# Patient Record
Sex: FEMALE | Race: WHITE | NOT HISPANIC OR LATINO | Employment: OTHER | URBAN - METROPOLITAN AREA
[De-identification: names, ages, dates, MRNs, and addresses within clinical notes are randomized per-mention and may not be internally consistent; named-entity substitution may affect disease eponyms.]

---

## 2018-02-14 ENCOUNTER — HISTORICAL (OUTPATIENT)
Dept: LAB | Facility: HOSPITAL | Age: 78
End: 2018-02-14

## 2018-02-14 LAB
ABS NEUT (OLG): 2.82 X10(3)/MCL (ref 2.1–9.2)
BASOPHILS # BLD AUTO: 0 X10(3)/MCL (ref 0–0.2)
BASOPHILS NFR BLD AUTO: 0 %
EOSINOPHIL # BLD AUTO: 0.2 X10(3)/MCL (ref 0–0.9)
EOSINOPHIL NFR BLD AUTO: 4 %
ERYTHROCYTE [DISTWIDTH] IN BLOOD BY AUTOMATED COUNT: 14.2 % (ref 11.5–17)
HCT VFR BLD AUTO: 42.1 % (ref 37–47)
HGB BLD-MCNC: 13.1 GM/DL (ref 12–16)
LYMPHOCYTES # BLD AUTO: 2.1 X10(3)/MCL (ref 0.6–4.6)
LYMPHOCYTES NFR BLD AUTO: 37 %
MCH RBC QN AUTO: 29.3 PG (ref 27–31)
MCHC RBC AUTO-ENTMCNC: 31.1 GM/DL (ref 33–36)
MCV RBC AUTO: 94.2 FL (ref 80–94)
MONOCYTES # BLD AUTO: 0.4 X10(3)/MCL (ref 0.1–1.3)
MONOCYTES NFR BLD AUTO: 8 %
NEUTROPHILS # BLD AUTO: 2.82 X10(3)/MCL (ref 1.4–7.9)
NEUTROPHILS NFR BLD AUTO: 50 %
PLATELET # BLD AUTO: 132 X10(3)/MCL (ref 130–400)
PMV BLD AUTO: 11.6 FL (ref 9.4–12.4)
RBC # BLD AUTO: 4.47 X10(6)/MCL (ref 4.2–5.4)
WBC # SPEC AUTO: 5.6 X10(3)/MCL (ref 4.5–11.5)

## 2019-03-18 ENCOUNTER — HISTORICAL (OUTPATIENT)
Dept: LAB | Facility: HOSPITAL | Age: 79
End: 2019-03-18

## 2019-03-18 LAB
ABS NEUT (OLG): 2.97 X10(3)/MCL (ref 2.1–9.2)
ALBUMIN SERPL-MCNC: 4.1 GM/DL (ref 3.4–5)
ALBUMIN/GLOB SERPL: 1.2 {RATIO}
ALP SERPL-CCNC: 69 UNIT/L (ref 38–126)
ALT SERPL-CCNC: 35 UNIT/L (ref 12–78)
AST SERPL-CCNC: 30 UNIT/L (ref 15–37)
BASOPHILS # BLD AUTO: 0 X10(3)/MCL (ref 0–0.2)
BASOPHILS NFR BLD AUTO: 1 %
BILIRUB SERPL-MCNC: 0.4 MG/DL (ref 0.2–1)
BILIRUBIN DIRECT+TOT PNL SERPL-MCNC: 0.1 MG/DL (ref 0–0.2)
BILIRUBIN DIRECT+TOT PNL SERPL-MCNC: 0.3 MG/DL (ref 0–0.8)
BUN SERPL-MCNC: 19 MG/DL (ref 7–18)
CALCIUM SERPL-MCNC: 9.2 MG/DL (ref 8.5–10.1)
CHLORIDE SERPL-SCNC: 103 MMOL/L (ref 98–107)
CHOLEST SERPL-MCNC: 162 MG/DL (ref 0–200)
CHOLEST/HDLC SERPL: 3.2 {RATIO} (ref 0–4)
CO2 SERPL-SCNC: 29 MMOL/L (ref 21–32)
CREAT SERPL-MCNC: 0.69 MG/DL (ref 0.55–1.02)
DEPRECATED CALCIDIOL+CALCIFEROL SERPL-MC: 33.63 NG/ML (ref 30–80)
EOSINOPHIL # BLD AUTO: 0.2 X10(3)/MCL (ref 0–0.9)
EOSINOPHIL NFR BLD AUTO: 3 %
ERYTHROCYTE [DISTWIDTH] IN BLOOD BY AUTOMATED COUNT: 15.8 % (ref 11.5–17)
EST. AVERAGE GLUCOSE BLD GHB EST-MCNC: 194 MG/DL
GLOBULIN SER-MCNC: 3.4 GM/DL (ref 2.4–3.5)
GLUCOSE SERPL-MCNC: 162 MG/DL (ref 74–106)
HBA1C MFR BLD: 8.4 % (ref 4.2–6.3)
HCT VFR BLD AUTO: 42.7 % (ref 37–47)
HDLC SERPL-MCNC: 50 MG/DL (ref 35–60)
HGB BLD-MCNC: 12.9 GM/DL (ref 12–16)
LDLC SERPL CALC-MCNC: 83 MG/DL (ref 0–129)
LYMPHOCYTES # BLD AUTO: 1.4 X10(3)/MCL (ref 0.6–4.6)
LYMPHOCYTES NFR BLD AUTO: 29 %
MCH RBC QN AUTO: 26.4 PG (ref 27–31)
MCHC RBC AUTO-ENTMCNC: 30.2 GM/DL (ref 33–36)
MCV RBC AUTO: 87.3 FL (ref 80–94)
MONOCYTES # BLD AUTO: 0.3 X10(3)/MCL (ref 0.1–1.3)
MONOCYTES NFR BLD AUTO: 7 %
NEUTROPHILS # BLD AUTO: 2.97 X10(3)/MCL (ref 2.1–9.2)
NEUTROPHILS NFR BLD AUTO: 60 %
PLATELET # BLD AUTO: 156 X10(3)/MCL (ref 130–400)
PMV BLD AUTO: 12.4 FL (ref 9.4–12.4)
POTASSIUM SERPL-SCNC: 4.4 MMOL/L (ref 3.5–5.1)
PROT SERPL-MCNC: 7.5 GM/DL (ref 6.4–8.2)
RBC # BLD AUTO: 4.89 X10(6)/MCL (ref 4.2–5.4)
SODIUM SERPL-SCNC: 139 MMOL/L (ref 136–145)
TRIGL SERPL-MCNC: 144 MG/DL (ref 30–150)
TSH SERPL-ACNC: 4.49 MIU/L (ref 0.36–3.74)
VLDLC SERPL CALC-MCNC: 29 MG/DL
WBC # SPEC AUTO: 4.9 X10(3)/MCL (ref 4.5–11.5)

## 2019-03-25 ENCOUNTER — HISTORICAL (OUTPATIENT)
Dept: LAB | Facility: HOSPITAL | Age: 79
End: 2019-03-25

## 2019-03-25 LAB
ALBUMIN SERPL-MCNC: 3.9 GM/DL (ref 3.4–5)
ALBUMIN/GLOB SERPL: 1.1 {RATIO}
ALP SERPL-CCNC: 61 UNIT/L (ref 38–126)
ALT SERPL-CCNC: 33 UNIT/L (ref 12–78)
APPEARANCE, UA: CLEAR
AST SERPL-CCNC: 29 UNIT/L (ref 15–37)
BACTERIA SPEC CULT: ABNORMAL /HPF
BILIRUB SERPL-MCNC: 0.5 MG/DL (ref 0.2–1)
BILIRUB UR QL STRIP: NEGATIVE
BILIRUBIN DIRECT+TOT PNL SERPL-MCNC: 0.2 MG/DL (ref 0–0.2)
BILIRUBIN DIRECT+TOT PNL SERPL-MCNC: 0.3 MG/DL (ref 0–0.8)
BUN SERPL-MCNC: 17 MG/DL (ref 7–18)
CALCIUM SERPL-MCNC: 9.3 MG/DL (ref 8.5–10.1)
CHLORIDE SERPL-SCNC: 104 MMOL/L (ref 98–107)
CO2 SERPL-SCNC: 25 MMOL/L (ref 21–32)
COLOR UR: YELLOW
CREAT SERPL-MCNC: 0.75 MG/DL (ref 0.55–1.02)
CREAT UR-MCNC: 107 MG/DL
EST. AVERAGE GLUCOSE BLD GHB EST-MCNC: 200 MG/DL
GLOBULIN SER-MCNC: 3.4 GM/DL (ref 2.4–3.5)
GLUCOSE (UA): ABNORMAL
GLUCOSE SERPL-MCNC: 131 MG/DL (ref 74–106)
HBA1C MFR BLD: 8.6 % (ref 4.2–6.3)
HGB UR QL STRIP: NEGATIVE
KETONES UR QL STRIP: NEGATIVE
LEUKOCYTE ESTERASE UR QL STRIP: ABNORMAL
MICROALBUMIN UR-MCNC: 1.5 MG/DL
MICROALBUMIN/CREAT RATIO PNL UR: 14 MG/GM CR (ref 0–30)
NITRITE UR QL STRIP: NEGATIVE
PH UR STRIP: 5 [PH] (ref 5–9)
POTASSIUM SERPL-SCNC: 4.8 MMOL/L (ref 3.5–5.1)
PROT SERPL-MCNC: 7.3 GM/DL (ref 6.4–8.2)
PROT UR QL STRIP: NEGATIVE
RBC #/AREA URNS HPF: ABNORMAL /[HPF]
SODIUM SERPL-SCNC: 139 MMOL/L (ref 136–145)
SP GR UR STRIP: 1.02 (ref 1–1.03)
SQUAMOUS EPITHELIAL, UA: 7 /HPF (ref 0–4)
TSH SERPL-ACNC: 3.9 MIU/L (ref 0.36–3.74)
UROBILINOGEN UR STRIP-ACNC: 0.2
WBC #/AREA URNS HPF: ABNORMAL /[HPF]

## 2019-03-27 LAB — FINAL CULTURE: NO GROWTH

## 2021-03-15 ENCOUNTER — HISTORICAL (OUTPATIENT)
Dept: ENDOSCOPY | Facility: HOSPITAL | Age: 81
End: 2021-03-15

## 2021-04-05 ENCOUNTER — HISTORICAL (OUTPATIENT)
Dept: RADIOLOGY | Facility: HOSPITAL | Age: 81
End: 2021-04-05

## 2021-04-05 LAB
ABS NEUT (OLG): 4.91 X10(3)/MCL (ref 2.1–9.2)
ALBUMIN SERPL-MCNC: 3.2 GM/DL (ref 3.4–4.8)
ALBUMIN/GLOB SERPL: 1 RATIO (ref 1.1–2)
ALP SERPL-CCNC: 59 UNIT/L (ref 40–150)
ALT SERPL-CCNC: 11 UNIT/L (ref 0–55)
AST SERPL-CCNC: 17 UNIT/L (ref 5–34)
BASOPHILS # BLD AUTO: 0 X10(3)/MCL (ref 0–0.2)
BASOPHILS NFR BLD AUTO: 0 %
BILIRUB SERPL-MCNC: 0.5 MG/DL
BILIRUBIN DIRECT+TOT PNL SERPL-MCNC: 0.2 MG/DL (ref 0–0.8)
BILIRUBIN DIRECT+TOT PNL SERPL-MCNC: 0.3 MG/DL (ref 0–0.5)
BUN SERPL-MCNC: 13.3 MG/DL (ref 9.8–20.1)
CALCIUM SERPL-MCNC: 9.6 MG/DL (ref 8.4–10.2)
CEA SERPL-MCNC: 2.28 NG/ML (ref 0–3)
CHLORIDE SERPL-SCNC: 104 MMOL/L (ref 98–107)
CO2 SERPL-SCNC: 29 MMOL/L (ref 23–31)
CREAT SERPL-MCNC: 0.6 MG/DL (ref 0.55–1.02)
EOSINOPHIL # BLD AUTO: 0.1 X10(3)/MCL (ref 0–0.9)
EOSINOPHIL NFR BLD AUTO: 1 %
ERYTHROCYTE [DISTWIDTH] IN BLOOD BY AUTOMATED COUNT: 21.4 % (ref 11.5–17)
GLOBULIN SER-MCNC: 3.2 GM/DL (ref 2.4–3.5)
GLUCOSE SERPL-MCNC: 119 MG/DL (ref 82–115)
HCT VFR BLD AUTO: 27 % (ref 37–47)
HGB BLD-MCNC: 7.5 GM/DL (ref 12–16)
LYMPHOCYTES # BLD AUTO: 1.5 X10(3)/MCL (ref 0.6–4.6)
LYMPHOCYTES NFR BLD AUTO: 21 %
MCH RBC QN AUTO: 19 PG (ref 27–31)
MCHC RBC AUTO-ENTMCNC: 27.8 GM/DL (ref 33–36)
MCV RBC AUTO: 68.5 FL (ref 80–94)
MONOCYTES # BLD AUTO: 0.6 X10(3)/MCL (ref 0.1–1.3)
MONOCYTES NFR BLD AUTO: 8 %
NEUTROPHILS # BLD AUTO: 4.91 X10(3)/MCL (ref 2.1–9.2)
NEUTROPHILS NFR BLD AUTO: 69 %
PLATELET # BLD AUTO: 335 X10(3)/MCL (ref 130–400)
PMV BLD AUTO: 10.4 FL (ref 9.4–12.4)
POTASSIUM SERPL-SCNC: 5.2 MMOL/L (ref 3.5–5.1)
PROT SERPL-MCNC: 6.4 GM/DL (ref 5.8–7.6)
RBC # BLD AUTO: 3.94 X10(6)/MCL (ref 4.2–5.4)
SODIUM SERPL-SCNC: 141 MMOL/L (ref 136–145)
WBC # SPEC AUTO: 7.1 X10(3)/MCL (ref 4.5–11.5)

## 2021-04-20 ENCOUNTER — HISTORICAL (OUTPATIENT)
Dept: INFUSION THERAPY | Facility: HOSPITAL | Age: 81
End: 2021-04-20

## 2021-04-20 LAB
ABS NEUT (OLG): 5.25 X10(3)/MCL (ref 2.1–9.2)
ANISOCYTOSIS BLD QL SMEAR: NORMAL
BASOPHILS # BLD AUTO: 0 X10(3)/MCL (ref 0–0.2)
BASOPHILS NFR BLD AUTO: 0.3 %
CROSSMATCH INTERPRETATION: NORMAL
EOSINOPHIL # BLD AUTO: 0.1 X10(3)/MCL (ref 0–0.9)
EOSINOPHIL NFR BLD AUTO: 1.2 %
ERYTHROCYTE [DISTWIDTH] IN BLOOD BY AUTOMATED COUNT: 22.6 % (ref 11.5–17)
GROUP & RH: NORMAL
HCT VFR BLD AUTO: 26 % (ref 37–47)
HGB BLD-MCNC: 7.5 GM/DL (ref 12–16)
HYPOCHROMIA BLD QL SMEAR: NORMAL
LYMPHOCYTES # BLD AUTO: 0.9 X10(3)/MCL (ref 0.6–4.6)
LYMPHOCYTES NFR BLD AUTO: 13.8 %
MCH RBC QN AUTO: 20.2 PG (ref 27–31)
MCHC RBC AUTO-ENTMCNC: 28.8 GM/DL (ref 33–36)
MCV RBC AUTO: 70.1 FL (ref 80–94)
MONOCYTES # BLD AUTO: 0.5 X10(3)/MCL (ref 0.1–1.3)
MONOCYTES NFR BLD AUTO: 7.2 %
NEUTROPHILS # BLD AUTO: 5.2 X10(3)/MCL (ref 2.1–9.2)
NEUTROPHILS NFR BLD AUTO: 77.2 %
OVALOCYTES BLD QL SMEAR: NORMAL
PLATELET # BLD AUTO: 208 X10(3)/MCL (ref 130–400)
PLATELET # BLD EST: NORMAL 10*3/UL
PMV BLD AUTO: 9.9 FL (ref 9.4–12.4)
POIKILOCYTOSIS BLD QL SMEAR: NORMAL
POLYCHROMASIA BLD QL SMEAR: NORMAL
PRODUCT READY: NORMAL
RBC # BLD AUTO: 3.71 X10(6)/MCL (ref 4.2–5.4)
RBC MORPH BLD: NORMAL
WBC # SPEC AUTO: 6.8 X10(3)/MCL (ref 4.5–11.5)

## 2021-05-20 ENCOUNTER — NURSE TRIAGE (OUTPATIENT)
Dept: ADMINISTRATIVE | Facility: CLINIC | Age: 81
End: 2021-05-20

## 2021-05-26 ENCOUNTER — HISTORICAL (OUTPATIENT)
Dept: ADMINISTRATIVE | Facility: HOSPITAL | Age: 81
End: 2021-05-26

## 2021-05-26 LAB
ABS NEUT (OLG): 5.61 X10(3)/MCL (ref 2.1–9.2)
ALBUMIN % SPEP (OHS): 41.33 % (ref 48.1–59.5)
ALBUMIN % SPEP (OHS): NORMAL % (ref 48.1–59.5)
ALBUMIN SERPL-MCNC: 3.1 GM/DL (ref 3.4–4.8)
ALBUMIN SERPL-MCNC: 3.2 GM/DL (ref 3.4–4.8)
ALBUMIN SERPL-MCNC: NORMAL GM/DL (ref 3.4–4.8)
ALBUMIN/GLOB SERPL: 0.8 RATIO (ref 1.1–2)
ALBUMIN/GLOB SERPL: 0.9 RATIO (ref 1.1–2)
ALBUMIN/GLOB SERPL: NORMAL RATIO (ref 1.1–2)
ALP SERPL-CCNC: 99 UNIT/L (ref 40–150)
ALPHA 1 GLOB (OHS): 0.41 GM/DL (ref 0–0.4)
ALPHA 1 GLOB (OHS): NORMAL GM/DL (ref 0–0.4)
ALPHA 1 GLOB% (OHS): 6 % (ref 2.3–4.9)
ALPHA 1 GLOB% (OHS): NORMAL % (ref 2.3–4.9)
ALPHA 2 GLOB % (OHS): 15.91 % (ref 6.9–13)
ALPHA 2 GLOB % (OHS): NORMAL % (ref 6.9–13)
ALPHA 2 GLOB (OHS): 1.08 GM/DL (ref 0.4–1)
ALPHA 2 GLOB (OHS): NORMAL GM/DL (ref 0.4–1)
ALT SERPL-CCNC: 28 UNIT/L (ref 0–55)
AST SERPL-CCNC: 39 UNIT/L (ref 5–34)
BASOPHILS # BLD AUTO: 0 X10(3)/MCL (ref 0–0.2)
BASOPHILS NFR BLD AUTO: 0.5 %
BETA GLOB (OHS): 1.19 GM/DL (ref 0.7–1.3)
BETA GLOB% (OHS): 17.43 % (ref 13.8–19.7)
BETA GLOB% (OHS): NORMAL % (ref 13.8–19.7)
BILIRUB SERPL-MCNC: 0.5 MG/DL
BILIRUBIN DIRECT+TOT PNL SERPL-MCNC: 0.2 MG/DL (ref 0–0.8)
BILIRUBIN DIRECT+TOT PNL SERPL-MCNC: 0.3 MG/DL (ref 0–0.5)
BUN SERPL-MCNC: 11.7 MG/DL (ref 9.8–20.1)
CALCIUM SERPL-MCNC: 9.7 MG/DL (ref 8.4–10.2)
CHLORIDE SERPL-SCNC: 103 MMOL/L (ref 98–107)
CO2 SERPL-SCNC: 26 MMOL/L (ref 23–31)
CREAT SERPL-MCNC: 0.66 MG/DL (ref 0.55–1.02)
EOSINOPHIL # BLD AUTO: 0.1 X10(3)/MCL (ref 0–0.9)
EOSINOPHIL NFR BLD AUTO: 1.2 %
ERYTHROCYTE [DISTWIDTH] IN BLOOD BY AUTOMATED COUNT: 23.8 % (ref 11.5–17)
GAMMA GLOBULIN % (OHS): 19.33 % (ref 10.1–21.9)
GAMMA GLOBULIN % (OHS): NORMAL % (ref 10.1–21.9)
GAMMA GLOBULIN (OHS): 1.31 GM/DL (ref 0.4–1.8)
GAMMA GLOBULIN (OHS): NORMAL GM/DL (ref 0.4–1.8)
GLOBULIN SER-MCNC: 3.7 GM/DL (ref 2.4–3.5)
GLOBULIN SER-MCNC: 3.7 GM/DL (ref 2.4–3.5)
GLOBULIN SER-MCNC: NORMAL GM/DL (ref 2.4–3.5)
GLUCOSE SERPL-MCNC: 141 MG/DL (ref 82–115)
HCT VFR BLD AUTO: 34.8 % (ref 37–47)
HGB BLD-MCNC: 10.5 GM/DL (ref 12–16)
IFE INTERPRETATION (OHS): ABNORMAL
IGA SERPL-MCNC: 584 MG/DL (ref 69–517)
IGG SERPL-MCNC: 1228 MG/DL
IGM SERPL-MCNC: 97 MG/DL (ref 33–293)
LYMPHOCYTES # BLD AUTO: 1.2 X10(3)/MCL (ref 0.6–4.6)
LYMPHOCYTES NFR BLD AUTO: 16.4 %
M SPIKE % (OHS): ABNORMAL
M SPIKE % (OHS): NORMAL %
M SPIKE (OHS): ABNORMAL
M SPIKE (OHS): NORMAL GM/DL
MCH RBC QN AUTO: 22.6 PG (ref 27–31)
MCHC RBC AUTO-ENTMCNC: 30.2 GM/DL (ref 33–36)
MCV RBC AUTO: 74.8 FL (ref 80–94)
MONOCYTES # BLD AUTO: 0.4 X10(3)/MCL (ref 0.1–1.3)
MONOCYTES NFR BLD AUTO: 5.8 %
NEUTROPHILS # BLD AUTO: 5.6 X10(3)/MCL (ref 2.1–9.2)
NEUTROPHILS NFR BLD AUTO: 75.2 %
PEP GRAPH (OHS): ABNORMAL
PLATELET # BLD AUTO: 340 X10(3)/MCL (ref 130–400)
PMV BLD AUTO: 9.4 FL (ref 9.4–12.4)
POTASSIUM SERPL-SCNC: 4.2 MMOL/L (ref 3.5–5.1)
PROT SERPL-MCNC: 6.8 GM/DL (ref 5.8–7.6)
PROT SERPL-MCNC: 6.9 GM/DL (ref 5.8–7.6)
PROT SERPL-MCNC: NORMAL GM/DL (ref 5.8–7.6)
RBC # BLD AUTO: 4.65 X10(6)/MCL (ref 4.2–5.4)
SODIUM SERPL-SCNC: 145 MMOL/L (ref 136–145)
SPE INTERPRETATION (OHS): NORMAL
WBC # SPEC AUTO: 7.5 X10(3)/MCL (ref 4.5–11.5)

## 2021-06-10 ENCOUNTER — HISTORICAL (OUTPATIENT)
Dept: ADMINISTRATIVE | Facility: HOSPITAL | Age: 81
End: 2021-06-10

## 2021-06-10 LAB
APPEARANCE, UA: CLEAR
BACTERIA SPEC CULT: ABNORMAL
BILIRUB UR QL STRIP: NEGATIVE
COLOR UR: YELLOW
GLUCOSE (UA): NEGATIVE
HGB UR QL STRIP: NEGATIVE
KETONES UR QL STRIP: NEGATIVE
LEUKOCYTE ESTERASE UR QL STRIP: NEGATIVE
NITRITE UR QL STRIP: NEGATIVE
PH UR STRIP: 5.5 [PH] (ref 5–9)
PROT UR QL STRIP: NEGATIVE
RBC #/AREA URNS HPF: ABNORMAL /[HPF]
SP GR UR STRIP: 1.02 (ref 1–1.03)
SQUAMOUS EPITHELIAL, UA: ABNORMAL
UROBILINOGEN UR STRIP-ACNC: 0.2
WBC #/AREA URNS HPF: ABNORMAL /HPF

## 2021-06-16 ENCOUNTER — HISTORICAL (OUTPATIENT)
Dept: ADMINISTRATIVE | Facility: HOSPITAL | Age: 81
End: 2021-06-16

## 2021-06-16 LAB
ABS NEUT (OLG): 3.27 X10(3)/MCL (ref 2.1–9.2)
ALBUMIN SERPL-MCNC: 3.2 GM/DL (ref 3.4–4.8)
ALBUMIN/GLOB SERPL: 0.9 RATIO (ref 1.1–2)
ALP SERPL-CCNC: 79 UNIT/L (ref 40–150)
ALT SERPL-CCNC: 13 UNIT/L (ref 0–55)
AST SERPL-CCNC: 22 UNIT/L (ref 5–34)
BASOPHILS # BLD AUTO: 0 X10(3)/MCL (ref 0–0.2)
BASOPHILS NFR BLD AUTO: 0.5 %
BILIRUB SERPL-MCNC: 0.3 MG/DL
BILIRUBIN DIRECT+TOT PNL SERPL-MCNC: 0.1 MG/DL (ref 0–0.8)
BILIRUBIN DIRECT+TOT PNL SERPL-MCNC: 0.2 MG/DL (ref 0–0.5)
BUN SERPL-MCNC: 14.7 MG/DL (ref 9.8–20.1)
CALCIUM SERPL-MCNC: 10.1 MG/DL (ref 8.4–10.2)
CEA SERPL-MCNC: 2.89 NG/ML (ref 0–3)
CHLORIDE SERPL-SCNC: 102 MMOL/L (ref 98–107)
CO2 SERPL-SCNC: 27 MMOL/L (ref 23–31)
CREAT SERPL-MCNC: 0.9 MG/DL (ref 0.55–1.02)
EOSINOPHIL # BLD AUTO: 0.2 X10(3)/MCL (ref 0–0.9)
EOSINOPHIL NFR BLD AUTO: 2.7 %
ERYTHROCYTE [DISTWIDTH] IN BLOOD BY AUTOMATED COUNT: 23.8 % (ref 11.5–17)
GLOBULIN SER-MCNC: 3.7 GM/DL (ref 2.4–3.5)
GLUCOSE SERPL-MCNC: 106 MG/DL (ref 82–115)
HCT VFR BLD AUTO: 35.4 % (ref 37–47)
HGB BLD-MCNC: 10.8 GM/DL (ref 12–16)
LYMPHOCYTES # BLD AUTO: 2 X10(3)/MCL (ref 0.6–4.6)
LYMPHOCYTES NFR BLD AUTO: 34 %
MCH RBC QN AUTO: 23.7 PG (ref 27–31)
MCHC RBC AUTO-ENTMCNC: 30.5 GM/DL (ref 33–36)
MCV RBC AUTO: 77.6 FL (ref 80–94)
MONOCYTES # BLD AUTO: 0.4 X10(3)/MCL (ref 0.1–1.3)
MONOCYTES NFR BLD AUTO: 6.7 %
NEUTROPHILS # BLD AUTO: 3.3 X10(3)/MCL (ref 2.1–9.2)
NEUTROPHILS NFR BLD AUTO: 55.9 %
PLATELET # BLD AUTO: 195 X10(3)/MCL (ref 130–400)
PMV BLD AUTO: 10.5 FL (ref 9.4–12.4)
POTASSIUM SERPL-SCNC: 5.6 MMOL/L (ref 3.5–5.1)
PROT SERPL-MCNC: 6.9 GM/DL (ref 5.8–7.6)
RBC # BLD AUTO: 4.56 X10(6)/MCL (ref 4.2–5.4)
SODIUM SERPL-SCNC: 140 MMOL/L (ref 136–145)
WBC # SPEC AUTO: 5.8 X10(3)/MCL (ref 4.5–11.5)

## 2021-09-20 ENCOUNTER — HISTORICAL (OUTPATIENT)
Dept: HEMATOLOGY/ONCOLOGY | Facility: CLINIC | Age: 81
End: 2021-09-20

## 2021-09-20 LAB
ABS NEUT (OLG): 3.49 X10(3)/MCL (ref 2.1–9.2)
ALBUMIN SERPL-MCNC: 3.8 GM/DL (ref 3.4–4.8)
ALBUMIN/GLOB SERPL: 1.3 RATIO (ref 1.1–2)
ALP SERPL-CCNC: 75 UNIT/L (ref 40–150)
ALT SERPL-CCNC: 14 UNIT/L (ref 0–55)
AST SERPL-CCNC: 21 UNIT/L (ref 5–34)
BASOPHILS # BLD AUTO: 0 X10(3)/MCL (ref 0–0.2)
BASOPHILS NFR BLD AUTO: 0.5 %
BILIRUB SERPL-MCNC: 0.5 MG/DL
BILIRUBIN DIRECT+TOT PNL SERPL-MCNC: 0.2 MG/DL (ref 0–0.5)
BILIRUBIN DIRECT+TOT PNL SERPL-MCNC: 0.3 MG/DL (ref 0–0.8)
BUN SERPL-MCNC: 16.4 MG/DL (ref 9.8–20.1)
CALCIUM SERPL-MCNC: 10.1 MG/DL (ref 8.4–10.2)
CEA SERPL-MCNC: 1.75 NG/ML (ref 0–3)
CHLORIDE SERPL-SCNC: 103 MMOL/L (ref 98–107)
CO2 SERPL-SCNC: 29 MMOL/L (ref 23–31)
CREAT SERPL-MCNC: 0.87 MG/DL (ref 0.55–1.02)
EOSINOPHIL # BLD AUTO: 0.1 X10(3)/MCL (ref 0–0.9)
EOSINOPHIL NFR BLD AUTO: 1.7 %
ERYTHROCYTE [DISTWIDTH] IN BLOOD BY AUTOMATED COUNT: 14.6 % (ref 11.5–17)
GLOBULIN SER-MCNC: 3 GM/DL (ref 2.4–3.5)
GLUCOSE SERPL-MCNC: 132 MG/DL (ref 82–115)
HCT VFR BLD AUTO: 40.8 % (ref 37–47)
HGB BLD-MCNC: 12.7 GM/DL (ref 12–16)
LYMPHOCYTES # BLD AUTO: 1.7 X10(3)/MCL (ref 0.6–4.6)
LYMPHOCYTES NFR BLD AUTO: 29.7 %
MCH RBC QN AUTO: 27.5 PG (ref 27–31)
MCHC RBC AUTO-ENTMCNC: 31.1 GM/DL (ref 33–36)
MCV RBC AUTO: 88.3 FL (ref 80–94)
MONOCYTES # BLD AUTO: 0.5 X10(3)/MCL (ref 0.1–1.3)
MONOCYTES NFR BLD AUTO: 8.2 %
NEUTROPHILS # BLD AUTO: 3.5 X10(3)/MCL (ref 2.1–9.2)
NEUTROPHILS NFR BLD AUTO: 59.7 %
PLATELET # BLD AUTO: 147 X10(3)/MCL (ref 130–400)
PMV BLD AUTO: 10.1 FL (ref 9.4–12.4)
POTASSIUM SERPL-SCNC: 4.8 MMOL/L (ref 3.5–5.1)
PROT SERPL-MCNC: 6.8 GM/DL (ref 5.8–7.6)
RBC # BLD AUTO: 4.62 X10(6)/MCL (ref 4.2–5.4)
SODIUM SERPL-SCNC: 143 MMOL/L (ref 136–145)
WBC # SPEC AUTO: 5.8 X10(3)/MCL (ref 4.5–11.5)

## 2021-09-24 ENCOUNTER — HISTORICAL (OUTPATIENT)
Dept: CARDIOLOGY | Facility: HOSPITAL | Age: 81
End: 2021-09-24

## 2021-09-24 LAB
INR PPP: 1 (ref 0–1.3)
PROTHROMBIN TIME: 13 SECOND(S) (ref 12.5–14.5)

## 2021-11-11 ENCOUNTER — HISTORICAL (OUTPATIENT)
Dept: ADMINISTRATIVE | Facility: HOSPITAL | Age: 81
End: 2021-11-11

## 2021-11-11 LAB
LEFT EYE DM RETINOPATHY: POSITIVE
RIGHT EYE DM RETINOPATHY: POSITIVE

## 2022-01-06 ENCOUNTER — HISTORICAL (OUTPATIENT)
Dept: HEMATOLOGY/ONCOLOGY | Facility: CLINIC | Age: 82
End: 2022-01-06

## 2022-01-06 LAB
ABS NEUT (OLG): 1.91 X10(3)/MCL (ref 2.1–9.2)
ALBUMIN SERPL-MCNC: 4 GM/DL (ref 3.4–4.8)
ALBUMIN/GLOB SERPL: 1.4 RATIO (ref 1.1–2)
ALP SERPL-CCNC: 72 UNIT/L (ref 40–150)
ALT SERPL-CCNC: 26 UNIT/L (ref 0–55)
AST SERPL-CCNC: 31 UNIT/L (ref 5–34)
BASOPHILS # BLD AUTO: 0 X10(3)/MCL (ref 0–0.2)
BASOPHILS NFR BLD AUTO: 0.8 %
BILIRUB SERPL-MCNC: 0.5 MG/DL
BILIRUBIN DIRECT+TOT PNL SERPL-MCNC: 0.2 MG/DL (ref 0–0.8)
BILIRUBIN DIRECT+TOT PNL SERPL-MCNC: 0.3 MG/DL (ref 0–0.5)
BUN SERPL-MCNC: 21.4 MG/DL (ref 9.8–20.1)
CALCIUM SERPL-MCNC: 10.1 MG/DL (ref 8.7–10.5)
CEA SERPL-MCNC: 2.08 NG/ML (ref 0–3)
CHLORIDE SERPL-SCNC: 102 MMOL/L (ref 98–107)
CO2 SERPL-SCNC: 28 MMOL/L (ref 23–31)
CREAT SERPL-MCNC: 0.89 MG/DL (ref 0.55–1.02)
EOSINOPHIL # BLD AUTO: 0 X10(3)/MCL (ref 0–0.9)
EOSINOPHIL NFR BLD AUTO: 1.1 %
ERYTHROCYTE [DISTWIDTH] IN BLOOD BY AUTOMATED COUNT: 14.5 % (ref 11.5–17)
GLOBULIN SER-MCNC: 2.9 GM/DL (ref 2.4–3.5)
GLUCOSE SERPL-MCNC: 90 MG/DL (ref 82–115)
HCT VFR BLD AUTO: 39.2 % (ref 37–47)
HGB BLD-MCNC: 12.8 GM/DL (ref 12–16)
LYMPHOCYTES # BLD AUTO: 1.1 X10(3)/MCL (ref 0.6–4.6)
LYMPHOCYTES NFR BLD AUTO: 30.7 %
MCH RBC QN AUTO: 28.8 PG (ref 27–31)
MCHC RBC AUTO-ENTMCNC: 32.7 GM/DL (ref 33–36)
MCV RBC AUTO: 88.1 FL (ref 80–94)
MONOCYTES # BLD AUTO: 0.5 X10(3)/MCL (ref 0.1–1.3)
MONOCYTES NFR BLD AUTO: 14.4 %
NEUTROPHILS # BLD AUTO: 1.9 X10(3)/MCL (ref 2.1–9.2)
NEUTROPHILS NFR BLD AUTO: 52.7 %
PLATELET # BLD AUTO: 116 X10(3)/MCL (ref 130–400)
PMV BLD AUTO: 10.8 FL (ref 9.4–12.4)
POTASSIUM SERPL-SCNC: 4.5 MMOL/L (ref 3.5–5.1)
PROT SERPL-MCNC: 6.9 GM/DL (ref 5.8–7.6)
RBC # BLD AUTO: 4.45 X10(6)/MCL (ref 4.2–5.4)
SODIUM SERPL-SCNC: 142 MMOL/L (ref 136–145)
WBC # SPEC AUTO: 3.6 X10(3)/MCL (ref 4.5–11.5)

## 2022-04-10 ENCOUNTER — HISTORICAL (OUTPATIENT)
Dept: ADMINISTRATIVE | Facility: HOSPITAL | Age: 82
End: 2022-04-10
Payer: MEDICARE

## 2022-04-29 VITALS
OXYGEN SATURATION: 98 % | WEIGHT: 147.69 LBS | SYSTOLIC BLOOD PRESSURE: 140 MMHG | WEIGHT: 147.69 LBS | HEIGHT: 65 IN | DIASTOLIC BLOOD PRESSURE: 78 MMHG | BODY MASS INDEX: 24.61 KG/M2 | SYSTOLIC BLOOD PRESSURE: 120 MMHG | DIASTOLIC BLOOD PRESSURE: 74 MMHG | BODY MASS INDEX: 24.61 KG/M2 | HEIGHT: 65 IN

## 2022-05-04 RX ORDER — ESCITALOPRAM OXALATE 10 MG/1
10 TABLET ORAL DAILY
COMMUNITY
Start: 2022-02-03 | End: 2022-05-04 | Stop reason: SDUPTHER

## 2022-05-04 RX ORDER — ESCITALOPRAM OXALATE 10 MG/1
10 TABLET ORAL DAILY
Qty: 90 TABLET | Refills: 0 | Status: SHIPPED | OUTPATIENT
Start: 2022-05-04 | End: 2022-08-17 | Stop reason: SDUPTHER

## 2022-05-06 DIAGNOSIS — C18.9 MALIGNANT NEOPLASM OF COLON, UNSPECIFIED PART OF COLON: Primary | ICD-10-CM

## 2022-05-08 DIAGNOSIS — C18.9 CANCER OF COLON: Primary | ICD-10-CM

## 2022-05-18 DIAGNOSIS — C18.9 MALIGNANT NEOPLASM OF COLON, UNSPECIFIED PART OF COLON: Primary | ICD-10-CM

## 2022-05-20 ENCOUNTER — LAB VISIT (OUTPATIENT)
Dept: LAB | Facility: HOSPITAL | Age: 82
End: 2022-05-20
Payer: MEDICARE

## 2022-05-20 DIAGNOSIS — C18.9 MALIGNANT NEOPLASM OF COLON, UNSPECIFIED PART OF COLON: ICD-10-CM

## 2022-05-20 LAB
ALBUMIN SERPL-MCNC: 4.2 GM/DL (ref 3.4–4.8)
ALBUMIN/GLOB SERPL: 1.4 RATIO (ref 1.1–2)
ALP SERPL-CCNC: 59 UNIT/L (ref 40–150)
ALT SERPL-CCNC: 19 UNIT/L (ref 0–55)
AST SERPL-CCNC: 24 UNIT/L (ref 5–34)
BASOPHILS # BLD AUTO: 0.03 X10(3)/MCL (ref 0–0.2)
BASOPHILS NFR BLD AUTO: 0.7 %
BILIRUBIN DIRECT+TOT PNL SERPL-MCNC: 0.7 MG/DL
BUN SERPL-MCNC: 20.1 MG/DL (ref 9.8–20.1)
CALCIUM SERPL-MCNC: 10.3 MG/DL (ref 8.4–10.2)
CEA SERPL-MCNC: 1.81 NG/ML (ref 0–3)
CHLORIDE SERPL-SCNC: 103 MMOL/L (ref 98–107)
CO2 SERPL-SCNC: 30 MMOL/L (ref 23–31)
CREAT SERPL-MCNC: 0.74 MG/DL (ref 0.55–1.02)
EOSINOPHIL # BLD AUTO: 0.15 X10(3)/MCL (ref 0–0.9)
EOSINOPHIL NFR BLD AUTO: 3.4 %
ERYTHROCYTE [DISTWIDTH] IN BLOOD BY AUTOMATED COUNT: 13.3 % (ref 11.5–17)
GLOBULIN SER-MCNC: 2.9 GM/DL (ref 2.4–3.5)
GLUCOSE SERPL-MCNC: 83 MG/DL (ref 82–115)
HCT VFR BLD AUTO: 41.3 % (ref 37–47)
HGB BLD-MCNC: 13.4 GM/DL (ref 12–16)
IMM GRANULOCYTES # BLD AUTO: 0.01 X10(3)/MCL (ref 0–0.02)
IMM GRANULOCYTES NFR BLD AUTO: 0.2 % (ref 0–0.43)
LYMPHOCYTES # BLD AUTO: 1.38 X10(3)/MCL (ref 0.6–4.6)
LYMPHOCYTES NFR BLD AUTO: 31.1 %
MCH RBC QN AUTO: 28.8 PG (ref 27–31)
MCHC RBC AUTO-ENTMCNC: 32.4 MG/DL (ref 33–36)
MCV RBC AUTO: 88.8 FL (ref 80–94)
MONOCYTES # BLD AUTO: 0.38 X10(3)/MCL (ref 0.1–1.3)
MONOCYTES NFR BLD AUTO: 8.6 %
NEUTROPHILS # BLD AUTO: 2.5 X10(3)/MCL (ref 2.1–9.2)
NEUTROPHILS NFR BLD AUTO: 56 %
PLATELET # BLD AUTO: 129 X10(3)/MCL (ref 130–400)
PMV BLD AUTO: 11.1 FL (ref 9.4–12.4)
POTASSIUM SERPL-SCNC: 4.4 MMOL/L (ref 3.5–5.1)
PROT SERPL-MCNC: 7.1 GM/DL (ref 5.8–7.6)
RBC # BLD AUTO: 4.65 X10(6)/MCL (ref 4.2–5.4)
SODIUM SERPL-SCNC: 144 MMOL/L (ref 136–145)
WBC # SPEC AUTO: 4.4 X10(3)/MCL (ref 4.5–11.5)

## 2022-05-20 PROCEDURE — 36415 COLL VENOUS BLD VENIPUNCTURE: CPT

## 2022-05-20 PROCEDURE — 82378 CARCINOEMBRYONIC ANTIGEN: CPT

## 2022-05-20 PROCEDURE — 80053 COMPREHEN METABOLIC PANEL: CPT

## 2022-05-20 PROCEDURE — 85025 COMPLETE CBC W/AUTO DIFF WBC: CPT

## 2022-05-23 ENCOUNTER — OFFICE VISIT (OUTPATIENT)
Dept: HEMATOLOGY/ONCOLOGY | Facility: CLINIC | Age: 82
End: 2022-05-23
Payer: MEDICARE

## 2022-05-23 VITALS
HEART RATE: 68 BPM | OXYGEN SATURATION: 97 % | BODY MASS INDEX: 27.67 KG/M2 | SYSTOLIC BLOOD PRESSURE: 121 MMHG | WEIGHT: 166.13 LBS | DIASTOLIC BLOOD PRESSURE: 71 MMHG

## 2022-05-23 DIAGNOSIS — C18.2 MALIGNANT NEOPLASM OF ASCENDING COLON: ICD-10-CM

## 2022-05-23 PROBLEM — C18.9 COLON CANCER: Status: ACTIVE | Noted: 2022-05-23

## 2022-05-23 PROCEDURE — 99999 PR PBB SHADOW E&M-EST. PATIENT-LVL III: ICD-10-PCS | Mod: PBBFAC,,, | Performed by: INTERNAL MEDICINE

## 2022-05-23 PROCEDURE — 99214 PR OFFICE/OUTPT VISIT, EST, LEVL IV, 30-39 MIN: ICD-10-PCS | Mod: S$PBB,,, | Performed by: INTERNAL MEDICINE

## 2022-05-23 PROCEDURE — 99214 OFFICE O/P EST MOD 30 MIN: CPT | Mod: S$PBB,,, | Performed by: INTERNAL MEDICINE

## 2022-05-23 PROCEDURE — 99999 PR PBB SHADOW E&M-EST. PATIENT-LVL III: CPT | Mod: PBBFAC,,, | Performed by: INTERNAL MEDICINE

## 2022-05-23 PROCEDURE — 99213 OFFICE O/P EST LOW 20 MIN: CPT | Mod: PBBFAC | Performed by: INTERNAL MEDICINE

## 2022-05-23 RX ORDER — LEVOTHYROXINE SODIUM 75 UG/1
75 TABLET ORAL DAILY
COMMUNITY
Start: 2022-05-19 | End: 2022-11-21 | Stop reason: SDUPTHER

## 2022-05-23 RX ORDER — METFORMIN HYDROCHLORIDE 1000 MG/1
500 TABLET ORAL DAILY
COMMUNITY
Start: 2022-04-21 | End: 2023-03-07

## 2022-05-23 RX ORDER — ASPIRIN 81 MG/1
81 TABLET ORAL
COMMUNITY
End: 2022-08-23

## 2022-05-23 RX ORDER — SPIRONOLACTONE 25 MG/1
12.5 TABLET ORAL DAILY
COMMUNITY
Start: 2022-04-07 | End: 2022-09-06 | Stop reason: SDUPTHER

## 2022-05-23 RX ORDER — NITROGLYCERIN 0.4 MG/1
0.4 TABLET SUBLINGUAL
COMMUNITY
Start: 2021-10-28

## 2022-05-23 RX ORDER — GLUCOSAM/CHON-MSM1/C/MANG/BOSW 500-416.6
TABLET ORAL
COMMUNITY
Start: 2022-03-16 | End: 2023-06-06

## 2022-05-23 RX ORDER — LIRAGLUTIDE 6 MG/ML
0.6 INJECTION SUBCUTANEOUS
COMMUNITY
Start: 2022-03-25 | End: 2023-02-17

## 2022-05-23 RX ORDER — PRAVASTATIN SODIUM 20 MG/1
20 TABLET ORAL NIGHTLY
COMMUNITY
Start: 2022-03-12 | End: 2022-06-09 | Stop reason: SDUPTHER

## 2022-05-23 RX ORDER — CALCIUM CITRATE/VITAMIN D3 200MG-6.25
TABLET ORAL
COMMUNITY
Start: 2022-05-13 | End: 2022-12-05

## 2022-05-23 RX ORDER — CHOLECALCIFEROL (VITAMIN D3) 50 MCG
2000 TABLET ORAL
COMMUNITY

## 2022-05-23 NOTE — PROGRESS NOTES
HEMATOLOGY/ONCOLOGY OFFICE CLINIC VISIT    Visit Information:    Initial Evaluation: 5/26/2021  Referring Provider: Dr. Robert Conner  Other providers:  Code status:  Not addressed    Diagnosis:  T2N0M0-Stage I Colon cancer. Dx on 4/22/21    Present treatment:    Surveillance    Treatment/Oncology history:  Colonoscopy  3/15/2021: malignant partially obstructing tumor in the ascending colon. Biopsy showed tubular adenoma with at least high-grade dysplasia.   Laparoscopic/robotic right hemicolectomy 4/22/2021.    Plan of care: Surveillance      Imaging:  CT A/P 3/17/2021 at [facility]: irregular colonic mass 5 to 6 cm in length proximal to midportion of descending colon.  Enhancing soft tissue component contacts and may invade the right lateral abdominal wall.  No regional or distant lymphadenopathy identified.  Cirrhotic liver with portal hypertension, small volume ascites and splenomegaly.  No focal lesion identified in the liver.    CT of the thorax 4/5/2021: no thoracic metastatic disease identified.  Small volume ascites and borderline splenomegaly.No adenopathy or distant metastases.  CT angiogram 5/20/2021: negative for pulmonary embolism but lungs demonstrate mild heterogeneous attenuation with subtle peripheral reticular opacities.  Primary differential consideration is small airways disease.  Findings may be secondary to multifocal infiltrate from infection versus pulmonary edema.  It also showed moderate ascites with increase in size since prior examination.  Liver with nodular contour and was enlarged.  Spleen borderline enlarged.  A 1.3 x 1 cm enhancing nodule in the left adrenal gland no significant change.  Ultrasound of the abdomen 5/23/2021:cirrhosis of the liver with hepatosplenomegaly and ascites.     Pathology:  4/22/2022:   RIGHT COLON, HEMICOLECTOMY: ADENOCARCINOMA, WELL DIFFERENTIATED, 90% MUCINOUS.   --  Greatest tumor dimension, 7.0 cm (as measured grossly).  --  Adenocarcinoma arises from  tubular adenoma with high grade dysplasia.  --  Lymphovascular invasion, not identified.  --  Adenocarcinoma superficially invades muscularis propria.  --  Eighteen mesenteric lymph nodes, no neoplasm (0/18).  --  Surgical margins, uninvolved.  POSTOPERATIVE SPINDLE CELL PSEUDOTUMOR, 1.5 CM.  -  The pseudotumor extends into mesenteric adipose tissue.   9/24/2021:  LIVER, CORE NEEDLE BIOPSY: FOCAL PORTAL TRIADITIS WITH SINUS DILATION.   - NO INTERFACE ACTIVITY.   - BRIDING FIBROSIS (FIBROSIS STAGE  F2-3)  - NO STEATOSIS.  Comment: Despite hepatitis testing demonstrating reactivity for hepatitis A in July 2021, no evidence of acute hepatitis is identified and LFTs are currently within normal limits. This case has also been reviewed in intradepartmental consultation.        CLINICAL HISTORY:       Patient: 82 year old female with multiple medical problems kindly referred for colon cancer.  She initially presented with anemia and fecal occult blood positive.  She also reports 20 pounds weight loss over the last previous 6 months. Colonoscopy on 3/15/2021 revealed a malignant partially obstructing tumor in the ascending colon. Biopsy showed tubular adenoma with at least high-grade dysplasia.  On 3/17/2021 she underwent a CT scan of the abdomen and pelvis at [facility] that showed an irregular colonic mass measuring about 5 to 6 cm in length involving proximal to midportion of descending colon.  Enhancing soft tissue component contacts and may invade the right lateral abdominal wall.  No regional or distant lymphadenopathy identified.  Cirrhotic liver with portal hypertension, small volume ascites and splenomegaly.  No focal lesion identified in the liver.  Staging CT of the thorax 4/5/2021 with no thoracic metastatic disease identified.  Small volume ascites and borderline splenomegaly.No adenopathy or distant metastases.     Patient was seen by Dr. Robert Conner and she underwent laparoscopic/robotic right  hemicolectomy on 4/22/2021.  Pathology report as follows:   RIGHT COLON, HEMICOLECTOMY: ADENOCARCINOMA, WELL DIFFERENTIATED, 90% MUCINOUS. 0/18 LN positive for malignanacy.See above for details.    Patient recovered from the surgery but came back to the emergency department for chest pain. She underwent CT angiogram that was negative for pulmonary embolism but lungs demonstrate mild heterogeneous attenuation with subtle peripheral reticular opacities.  Primary differential consideration is small airways disease.  Findings may be secondary to multifocal infiltrate from infection versus pulmonary edema.  It also showed moderate ascites with increase in size since prior examination.  Liver with nodular contour and was enlarged.  Spleen borderline enlarged.  1.3 x 1 cm enhancing nodule in the left adrenal gland no significant change.    She underwent ultrasound of the abdomen that confirmed the diagnosis of cirrhosis of the liver with hepatosplenomegaly and ascites.  She was discharged on 5/20/2021.    She stopped drinking at age 38, she says that she is a social drinker. She denies any family history of colon cancer. She denies any fever, chills, or sweats. No chest pain or shortness of breath.    She had liver biopsy ordered by Dr. Louis on 9/24/21. It was ordered for cirrhosis. She has no history of hepatitis infection or alcohol abuse. Biopsy was negative for malignancy. Just  fibrosis.              Chief Complaint: no concerns      Interval History:  Patient presents today for f/u in surveillance for colon cancer. (Stage 1 diagnosed in April 2021), daughter present. Patient has been doing well, has not been having any problems. She had colonoscopy on 3/29/22 with Dr. Louis, 1 tiny polyp removed, awaiting pathology.     ROS:  All 14 points ROS taken and as per Interval History    Histories:  PMH/PSH/FH/SOCIAL/ALLERGIES AND MEDS REVIEWED AND UPDATED AS APPROPRIATE       Vitals:    05/23/22 1044   BP: 121/71   Pulse:  68   SpO2: 97%   Weight: 75.3 kg (166 lb 1.6 oz)      Physical Exam  ECOG SCORE           Laboratory:  CBC with Differential:  Lab Results   Component Value Date    WBC 4.4 (L) 05/20/2022    RBC 4.65 05/20/2022    HGB 13.4 05/20/2022    HCT 41.3 05/20/2022    MCV 88.8 05/20/2022    MCH 28.8 05/20/2022    MCHC 32.4 (L) 05/20/2022    RDW 13.3 05/20/2022     (L) 05/20/2022    MPV 11.1 05/20/2022        CMP:  Sodium Level   Date Value Ref Range Status   05/20/2022 144 136 - 145 mmol/L Final     Potassium Level   Date Value Ref Range Status   05/20/2022 4.4 3.5 - 5.1 mmol/L Final     Carbon Dioxide   Date Value Ref Range Status   05/20/2022 30 23 - 31 mmol/L Final     Blood Urea Nitrogen   Date Value Ref Range Status   05/20/2022 20.1 9.8 - 20.1 mg/dL Final     Creatinine   Date Value Ref Range Status   05/20/2022 0.74 0.55 - 1.02 mg/dL Final     Calcium Level Total   Date Value Ref Range Status   05/20/2022 10.3 (H) 8.4 - 10.2 mg/dL Final     Albumin Level   Date Value Ref Range Status   05/20/2022 4.2 3.4 - 4.8 gm/dL Final     Bilirubin Total   Date Value Ref Range Status   05/20/2022 0.7 <=1.5 mg/dL Final     Alkaline Phosphatase   Date Value Ref Range Status   05/20/2022 59 40 - 150 unit/L Final     Aspartate Aminotransferase   Date Value Ref Range Status   05/20/2022 24 5 - 34 unit/L Final     Alanine Aminotransferase   Date Value Ref Range Status   05/20/2022 19 0 - 55 unit/L Final     Estimated GFR-Non    Date Value Ref Range Status   05/20/2022 >60 mls/min/1.73/m2 Final             Assessment:       1. Malignant neoplasm of ascending colon    1) pT2pN0M0--Stage I colon cancer--diagnosed in April 2021   NCCN guidelines (COL-3) for pathological stage T2, N0, M0   Surveillance (COL-8): colonoscopy in 1 year after surgery and then in 1 year for advanced adenoma, if no advanced adenoma then repeat in 3 yrs and then q 5 yrs.  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  These are benign lesions.  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   Colonoscopy due in 3 yrs-3/2025  Will request colonoscopy pathology and call patient with results  RTC in 3 month with labs: CBC, CMP, CEA    Encouraged to call for any questions or problems  The patient was given ample opportunity to ask questions and they were all answered to satisfaction; patient demonstrated understanding of what we discussed and is agreeable to the plan.            VINOD QUINTANILLA MD      Professional Services   I, Marian Burno LPN, acted solely as a scribe for and in the presence of Dr. Vinod Quintanilla, who performed these services.

## 2022-06-09 RX ORDER — PRAVASTATIN SODIUM 20 MG/1
20 TABLET ORAL NIGHTLY
Qty: 30 TABLET | Refills: 11 | Status: SHIPPED | OUTPATIENT
Start: 2022-06-09 | End: 2023-06-05

## 2022-07-18 ENCOUNTER — TELEPHONE (OUTPATIENT)
Dept: FAMILY MEDICINE | Facility: CLINIC | Age: 82
End: 2022-07-18
Payer: MEDICARE

## 2022-07-18 DIAGNOSIS — Z00.00 WELLNESS EXAMINATION: Primary | ICD-10-CM

## 2022-07-18 DIAGNOSIS — I10 HYPERTENSION, UNSPECIFIED TYPE: ICD-10-CM

## 2022-07-18 DIAGNOSIS — E03.9 HYPOTHYROIDISM, UNSPECIFIED TYPE: ICD-10-CM

## 2022-07-18 DIAGNOSIS — E11.9 TYPE 2 DIABETES MELLITUS WITHOUT COMPLICATION, UNSPECIFIED WHETHER LONG TERM INSULIN USE: ICD-10-CM

## 2022-07-18 DIAGNOSIS — E55.9 HYPOVITAMINOSIS D: ICD-10-CM

## 2022-07-20 LAB
CHOL/HDLC RATIO: 3
CHOLEST SERPL-MSCNC: 150 MG/DL (ref 0–200)
HBA1C MFR BLD: 6.5 % (ref 4–6)
HDLC SERPL-MCNC: 50 MG/DL
LDL/HDL RATIO: 2
LDLC SERPL CALC-MCNC: 76 MG/DL (ref 0–160)
TRIGL SERPL-MCNC: 119 MG/DL
VLDL CHOLESTEROL: 24 MG/DL

## 2022-07-27 ENCOUNTER — DOCUMENTATION ONLY (OUTPATIENT)
Dept: FAMILY MEDICINE | Facility: CLINIC | Age: 82
End: 2022-07-27

## 2022-07-27 ENCOUNTER — TELEPHONE (OUTPATIENT)
Dept: FAMILY MEDICINE | Facility: CLINIC | Age: 82
End: 2022-07-27

## 2022-07-27 ENCOUNTER — OFFICE VISIT (OUTPATIENT)
Dept: FAMILY MEDICINE | Facility: CLINIC | Age: 82
End: 2022-07-27
Payer: MEDICARE

## 2022-07-27 VITALS
HEART RATE: 71 BPM | BODY MASS INDEX: 28.41 KG/M2 | DIASTOLIC BLOOD PRESSURE: 78 MMHG | WEIGHT: 170.5 LBS | RESPIRATION RATE: 16 BRPM | SYSTOLIC BLOOD PRESSURE: 120 MMHG | TEMPERATURE: 98 F | OXYGEN SATURATION: 99 %

## 2022-07-27 DIAGNOSIS — Z00.00 MEDICARE ANNUAL WELLNESS VISIT, SUBSEQUENT: Primary | ICD-10-CM

## 2022-07-27 DIAGNOSIS — E78.5 DYSLIPIDEMIA: ICD-10-CM

## 2022-07-27 DIAGNOSIS — E03.9 HYPOTHYROIDISM, UNSPECIFIED TYPE: ICD-10-CM

## 2022-07-27 DIAGNOSIS — Z71.89 ADVANCED CARE PLANNING/COUNSELING DISCUSSION: ICD-10-CM

## 2022-07-27 DIAGNOSIS — E11.9 TYPE 2 DIABETES MELLITUS WITHOUT COMPLICATION, WITH LONG-TERM CURRENT USE OF INSULIN: ICD-10-CM

## 2022-07-27 DIAGNOSIS — I85.00 ESOPHAGEAL VARICES WITHOUT BLEEDING, UNSPECIFIED ESOPHAGEAL VARICES TYPE: ICD-10-CM

## 2022-07-27 DIAGNOSIS — I25.10 ARTERIOSCLEROSIS OF CORONARY ARTERY: ICD-10-CM

## 2022-07-27 DIAGNOSIS — Z12.31 BREAST CANCER SCREENING BY MAMMOGRAM: ICD-10-CM

## 2022-07-27 DIAGNOSIS — Z78.0 POSTMENOPAUSAL: ICD-10-CM

## 2022-07-27 DIAGNOSIS — F41.9 ANXIETY: ICD-10-CM

## 2022-07-27 DIAGNOSIS — Z79.4 TYPE 2 DIABETES MELLITUS WITHOUT COMPLICATION, WITH LONG-TERM CURRENT USE OF INSULIN: ICD-10-CM

## 2022-07-27 DIAGNOSIS — C18.2 MALIGNANT NEOPLASM OF ASCENDING COLON: ICD-10-CM

## 2022-07-27 DIAGNOSIS — I10 HYPERTENSION, UNSPECIFIED TYPE: ICD-10-CM

## 2022-07-27 DIAGNOSIS — M85.80 OSTEOPENIA, UNSPECIFIED LOCATION: ICD-10-CM

## 2022-07-27 DIAGNOSIS — E66.9 OBESITY, UNSPECIFIED CLASSIFICATION, UNSPECIFIED OBESITY TYPE, UNSPECIFIED WHETHER SERIOUS COMORBIDITY PRESENT: ICD-10-CM

## 2022-07-27 DIAGNOSIS — E55.9 VITAMIN D DEFICIENCY: ICD-10-CM

## 2022-07-27 PROCEDURE — G0439 PPPS, SUBSEQ VISIT: HCPCS | Mod: ,,, | Performed by: FAMILY MEDICINE

## 2022-07-27 PROCEDURE — G0439 PR MEDICARE ANNUAL WELLNESS SUBSEQUENT VISIT: ICD-10-PCS | Mod: ,,, | Performed by: FAMILY MEDICINE

## 2022-07-27 NOTE — ASSESSMENT & PLAN NOTE
Will request recent labs and call with recs once reviewed  dexa 8/2020. Repeat due. Ordered  mmg due 8/2022. ordered  Colonoscopy 3/2021 with dr. mroales however patient reports repeat done 2022. So we will request record    Advanced care planning discussed and paperwork given

## 2022-07-27 NOTE — ASSESSMENT & PLAN NOTE
a1c pending.  5.6 1/2022  Urine micro pending 7/2021  Foot exam today  Eye exam 11/2021 with dr. magana    On victoza and metformin. On statin.

## 2022-07-27 NOTE — PROGRESS NOTES
Subjective:        Patient ID: Wendie Ferreira is a 82 y.o. female.    Chief Complaint: Medicare AWV (wellness)      presents to clinic unaccompanied for wellness visit. she did labs prior to appt at [facility] about 1 week however it was not available at the time of her appointment to be reviewed.  We will request and call patient with results once received.    labs january 2021 showed anemia so fobt and ua were ordered. her fobt was positive 1/27/21 so she was referred to GI. She had a colonoscopy with dr. morales on 3/15/21 which showed hemorrhoids but also a mass in the ascending colon. biopsy showed tubular adenoma. she had ct chest which was negative for disease progression 4/5/21. she had laparoscopic/robotic right hemicolectomy with dr. lia duran on 4/22/21. she was discharged home on 4/25/21 oral iron. she went to the [facility] on 5/20- 5/24/21 for SOB and anemia.  was also given bactrim for erythema of her incision. she also received 1 UPRBC while inpatient as well as IV iron. she was discharged and told to stop norvasc, benazepril. she has also been off lasix. she is followed by oncology, dr. whittington. has follow up scheduled in 8/2022 with labs. repeat colonoscopy with dr. morales is due 3/29/2022. She states she completed however we do not have documentation. Will request from his office.   EGD with dr. morales 7/26/21- showed 3 varices of lower esophagus and gastritis. Repeat EGD in 2 years and US guided liver biopsy 9/2021.  She is eating and drinking well. Denies problems with urine or bowel.  No blood in stool.       Patient has a history of diabetes. Currently she is on metformin and Victoza. She has been using and tolerated well. Her eye exams are done by Dr. Andersen 11/2021- note in chart. Her last foot exam was today. a1c 5.6 1/2022. checking cbgs average 130s. urine micro 7/2021    She also has a history of CAD, hypertension and hyperlipidemia. Her cardiologist is Dr. Saab.saw him recently.  All was  "good. No changes. She is on a baby aspirin.     She also has a history of low vitamin D and supplements over-the-counter     history of hypothyroidism. on Synthroid. tsh wnl 7/2021.    She also has a history of anxiety and is on Lexapro. Happy with dosing.     Her mammogram in August 2020 was normal. does every other year. Her bone density test in August 2020 showed osteopenia. we will order    She is ALLERGIC to codeine. She does not drink alcohol or smoke. She reports she had shingles (8/2015) and pneumonia shots at "[employer]. utd on 2021 flu shot      Advance Care Planning  Advanced care planning discussed and paperwork given.    I attest that I have had a face to face discussion with patient and or surrogate decision maker.   Included surrogate decision maker: NO  Advanced directive in chart: NO  LAPOST: NO    Total time spent: 16 minutes            Review of Systems   Constitutional: Negative.    HENT: Negative.    Eyes: Negative.    Respiratory: Negative.    Cardiovascular: Negative.    Gastrointestinal: Negative.  Negative for abdominal distention, abdominal pain, anal bleeding, blood in stool, constipation, diarrhea, nausea, rectal pain and vomiting.   Endocrine: Negative.    Genitourinary: Negative.    Musculoskeletal: Negative.    Skin: Negative.    Allergic/Immunologic: Negative.    Neurological: Negative.    Hematological: Negative.    Psychiatric/Behavioral: Negative.          Review of patient's allergies indicates:   Allergen Reactions    Adhesive      Other reaction(s): Skin tear  adhesive tape    Codeine      "Feels like I'm having a heart attack"    Milk      Other reaction(s): GAS      Vitals:    07/27/22 0913   BP: 120/78   BP Location: Left arm   Pulse: 71   Resp: 16   Temp: 98.1 °F (36.7 °C)   SpO2: 99%   Weight: 77.3 kg (170 lb 8 oz)      Social History     Socioeconomic History    Marital status: Unknown   Tobacco Use    Smoking status: Former Smoker    Smokeless tobacco: Never Used "   Substance and Sexual Activity    Alcohol use: Never    Drug use: Never      Family History   Problem Relation Age of Onset    Hypertension Mother     Heart failure Mother     Diabetes Father     Hypertension Sister           Objective:     Physical Exam  Vitals and nursing note reviewed.   Constitutional:       Appearance: Normal appearance. She is obese.   HENT:      Head: Normocephalic and atraumatic.      Nose: Nose normal.      Mouth/Throat:      Mouth: Mucous membranes are moist.      Pharynx: Oropharynx is clear.   Eyes:      Extraocular Movements: Extraocular movements intact.   Cardiovascular:      Rate and Rhythm: Normal rate and regular rhythm.      Pulses:           Dorsalis pedis pulses are 3+ on the right side and 3+ on the left side.        Posterior tibial pulses are 3+ on the right side and 3+ on the left side.      Heart sounds: Normal heart sounds.   Pulmonary:      Effort: Pulmonary effort is normal.      Breath sounds: Normal breath sounds.   Musculoskeletal:         General: Normal range of motion.      Cervical back: Normal range of motion.   Feet:      Right foot:      Protective Sensation: 8 sites tested. 8 sites sensed.      Skin integrity: Skin integrity normal.      Left foot:      Protective Sensation: 8 sites tested. 8 sites sensed.      Skin integrity: Skin integrity normal.   Skin:     General: Skin is warm and dry.   Neurological:      General: No focal deficit present.      Mental Status: She is alert and oriented to person, place, and time. Mental status is at baseline.   Psychiatric:         Mood and Affect: Mood normal.       Current Outpatient Medications on File Prior to Visit   Medication Sig Dispense Refill    aspirin (ECOTRIN) 81 MG EC tablet Take 81 mg by mouth.      cholecalciferol, vitamin D3, (VITAMIN D3) 50 mcg (2,000 unit) Tab Take 2,000 Int'l Units by mouth.      EScitalopram oxalate (LEXAPRO) 10 MG tablet Take 1 tablet (10 mg total) by mouth once daily. 90  tablet 0    levothyroxine (SYNTHROID) 75 MCG tablet Take 75 mcg by mouth once daily.      metFORMIN (GLUCOPHAGE) 1000 MG tablet Take 500 mg by mouth once daily.      nitroGLYCERIN (NITROSTAT) 0.4 MG SL tablet Place 0.4 mg under the tongue.      pravastatin (PRAVACHOL) 20 MG tablet Take 1 tablet (20 mg total) by mouth every evening. 30 tablet 11    spironolactone (ALDACTONE) 25 MG tablet Take 12.5 mg by mouth once daily.      TRUE METRIX GLUCOSE TEST STRIP Strp USE TO TEST ONCE DAILY      TRUEPLUS LANCETS 28 gauge Misc USE TO TEST ONCE DAILY      UNABLE TO FIND 1 lancet once daily.      UNABLE TO FIND   BD pen needle shrt [id number] (5/16) ROMA, See Instructions, use daily to inject insulin. dx. e11.9, # 100 EA, 11 Refill(s), Pharmacy: [id number], 164, cm, Height/Length Dosing, 06/02/21 21:58:00 CDT, 73.2, kg, Weight Dosing, 06/02/21 21:...      VICTOZA 3-MARTA 0.6 mg/0.1 mL (18 mg/3 mL) PnIj pen        No current facility-administered medications on file prior to visit.     Health Maintenance   Topic Date Due    DEXA Scan  08/28/2023    Lipid Panel  07/21/2026    TETANUS VACCINE  09/09/2027      Results for orders placed or performed in visit on 05/20/22   CEA (in-house)   Result Value Ref Range    Carcinoembryonic Antigen 1.81 0.00 - 3.00 ng/mL   Comprehensive Metabolic Panel   Result Value Ref Range    Sodium Level 144 136 - 145 mmol/L    Potassium Level 4.4 3.5 - 5.1 mmol/L    Chloride 103 98 - 107 mmol/L    Carbon Dioxide 30 23 - 31 mmol/L    Glucose Level 83 82 - 115 mg/dL    Blood Urea Nitrogen 20.1 9.8 - 20.1 mg/dL    Creatinine 0.74 0.55 - 1.02 mg/dL    Calcium Level Total 10.3 (H) 8.4 - 10.2 mg/dL    Protein Total 7.1 5.8 - 7.6 gm/dL    Albumin Level 4.2 3.4 - 4.8 gm/dL    Globulin 2.9 2.4 - 3.5 gm/dL    Albumin/Globulin Ratio 1.4 1.1 - 2.0 ratio    Bilirubin Total 0.7 <=1.5 mg/dL    Alkaline Phosphatase 59 40 - 150 unit/L    Alanine Aminotransferase 19 0 - 55 unit/L    Aspartate  Aminotransferase 24 5 - 34 unit/L    Estimated GFR-Non  >60 mls/min/1.73/m2   CBC with Differential   Result Value Ref Range    WBC 4.4 (L) 4.5 - 11.5 x10(3)/mcL    RBC 4.65 4.20 - 5.40 x10(6)/mcL    Hgb 13.4 12.0 - 16.0 gm/dL    Hct 41.3 37.0 - 47.0 %    MCV 88.8 80.0 - 94.0 fL    MCH 28.8 27.0 - 31.0 pg    MCHC 32.4 (L) 33.0 - 36.0 mg/dL    RDW 13.3 11.5 - 17.0 %    Platelet 129 (L) 130 - 400 x10(3)/mcL    MPV 11.1 9.4 - 12.4 fL    Neut % 56.0 %    Lymph % 31.1 %    Mono % 8.6 %    Eos % 3.4 %    Basophil % 0.7 %    Lymph # 1.38 0.6 - 4.6 x10(3)/mcL    Neut # 2.5 2.1 - 9.2 x10(3)/mcL    Mono # 0.38 0.1 - 1.3 x10(3)/mcL    Eos # 0.15 0 - 0.9 x10(3)/mcL    Baso # 0.03 0 - 0.2 x10(3)/mcL    IG# 0.01 0 - 0.0155 x10(3)/mcL    IG% 0.2 0 - 0.43 %          Assessment & Plan:     Active Problem List with Overview Notes    Diagnosis Date Noted    Medicare annual wellness visit, subsequent 07/27/2022    Postmenopausal 07/27/2022    Breast cancer screening by mammogram 07/27/2022    Hypothyroidism 07/27/2022    Type 2 diabetes mellitus 07/27/2022    Vitamin D deficiency 07/27/2022    Obesity 07/27/2022    Anxiety 07/27/2022    Arteriosclerosis of coronary artery 07/27/2022    Dyslipidemia 07/27/2022    Osteopenia 07/27/2022    Hypertension 07/27/2022    Esophageal varices 07/27/2022    Advanced care planning/counseling discussion 07/27/2022    Colon cancer 05/23/2022       1. Medicare annual wellness visit, subsequent  Assessment & Plan:  Will request recent labs and call with recs once reviewed  dexa 8/2020. Repeat due. Ordered  mmg due 8/2022. ordered  Colonoscopy 3/2021 with dr. morales however patient reports repeat done 2022. So we will request record    Advanced care planning discussed and paperwork given      2. Advanced care planning/counseling discussion  Assessment & Plan:  Advanced care planning discussed and paperwork given.    I attest that I have had a face to face discussion with  patient and or surrogate decision maker.   Included surrogate decision maker: NO  Advanced directive in chart: NO  LAPOST: NO    Total time spent: 16 minutes        3. Osteopenia, unspecified location  Assessment & Plan:  See postmenopausal A&P      4. Esophageal varices without bleeding, unspecified esophageal varices type  Assessment & Plan:  egd 7/2021 with dr. Louis. Recommended repeat in 2 years      5. Hypothyroidism, unspecified type  Assessment & Plan:  Labs requested. Stable on synthroid      6. Obesity, unspecified classification, unspecified obesity type, unspecified whether serious comorbidity present  Assessment & Plan:  Encourage lifestyle change      7. Type 2 diabetes mellitus without complication, with long-term current use of insulin  Assessment & Plan:  a1c pending.  5.6 1/2022  Urine micro pending 7/2021  Foot exam today  Eye exam 11/2021 with dr. magana    On victoza and metformin. On statin.       8. Vitamin D deficiency  Assessment & Plan:  Continue to supplement otc      9. Malignant neoplasm of ascending colon  Assessment & Plan:  Keep appointments with GI and oncology        10. Dyslipidemia  Assessment & Plan:  On statin. Keep appointments with dr. ovalle      11. Hypertension, unspecified type  Assessment & Plan:  Stable on current meds. On asa. Keep appointments with cards      12. Anxiety  Assessment & Plan:  Stable on lexapro      13. Postmenopausal  Assessment & Plan:  Repeat dexa due 8/2022. Ordered. On ca and vit d. Encourage weight bearing exercises    Orders:  -     DXA Bone Density Spine And Hip    14. Breast cancer screening by mammogram  Assessment & Plan:  mmg due 8/2022. ordered    Orders:  -     Mammo Digital Screening Bilat w/ Otis    15. Arteriosclerosis of coronary artery  Assessment & Plan:  Stable on current meds. Keep appointments with cards         Follow up in about 1 year (around 7/27/2023) for wellness with labs.

## 2022-07-28 NOTE — PROGRESS NOTES
"These results have been reviewed by your healthcare team.  You are advised to continue your current medications.    Labs are within normal range except  >platelets were slightly low. Have been on lower side in past. Avoid asa. Monitor.  Keep appointments with oncology  >A1c was 6.5. good job. Continue current meds.     Please keep in mind that results may often be outside of the "normal" range while still being acceptable for your diagnosis and your plan of care.  You will be contacted if your results require a change in your medical treatment. If you wish to discuss your results, you will be required to schedule an appointment.   "

## 2022-08-05 LAB — BCS RECOMMENDATION EXT: NORMAL

## 2022-08-08 ENCOUNTER — DOCUMENTATION ONLY (OUTPATIENT)
Dept: FAMILY MEDICINE | Facility: CLINIC | Age: 82
End: 2022-08-08
Payer: MEDICARE

## 2022-08-08 ENCOUNTER — PATIENT OUTREACH (OUTPATIENT)
Dept: ADMINISTRATIVE | Facility: HOSPITAL | Age: 82
End: 2022-08-08
Payer: MEDICARE

## 2022-08-12 ENCOUNTER — DOCUMENTATION ONLY (OUTPATIENT)
Dept: ADMINISTRATIVE | Facility: HOSPITAL | Age: 82
End: 2022-08-12
Payer: MEDICARE

## 2022-08-15 ENCOUNTER — DOCUMENTATION ONLY (OUTPATIENT)
Dept: ADMINISTRATIVE | Facility: HOSPITAL | Age: 82
End: 2022-08-15
Payer: MEDICARE

## 2022-08-17 RX ORDER — ESCITALOPRAM OXALATE 10 MG/1
10 TABLET ORAL DAILY
Qty: 90 TABLET | Refills: 3 | Status: SHIPPED | OUTPATIENT
Start: 2022-08-17 | End: 2022-11-21 | Stop reason: SDUPTHER

## 2022-08-17 NOTE — TELEPHONE ENCOUNTER
----- Message from Garry Hu sent at 8/17/2022 12:47 PM CDT -----  .Type:  Needs Medical Advice    Who Called: Wendie  Symptoms (please be specific):    How long has patient had these symptoms:    Pharmacy name and phone #:  [facility]  Would the patient rather a call back or a response via [facility]?   Best Call Back Number: [phone]  Additional Information: She needs to speak with nurse about her medication that she has been trying to refill. She told me someone is dropping the ball. The medication is escitalopram 10 mg.         Patient requesting call back for test results

## 2022-08-19 ENCOUNTER — LAB VISIT (OUTPATIENT)
Dept: LAB | Facility: HOSPITAL | Age: 82
End: 2022-08-19
Attending: INTERNAL MEDICINE
Payer: MEDICARE

## 2022-08-19 DIAGNOSIS — C18.2 MALIGNANT NEOPLASM OF ASCENDING COLON: ICD-10-CM

## 2022-08-19 LAB
ALBUMIN SERPL-MCNC: 4 GM/DL (ref 3.4–4.8)
ALBUMIN/GLOB SERPL: 1.3 RATIO (ref 1.1–2)
ALP SERPL-CCNC: 52 UNIT/L (ref 40–150)
ALT SERPL-CCNC: 21 UNIT/L (ref 0–55)
AST SERPL-CCNC: 23 UNIT/L (ref 5–34)
BASOPHILS # BLD AUTO: 0.04 X10(3)/MCL (ref 0–0.2)
BASOPHILS NFR BLD AUTO: 0.8 %
BILIRUBIN DIRECT+TOT PNL SERPL-MCNC: 0.9 MG/DL
BUN SERPL-MCNC: 14.9 MG/DL (ref 9.8–20.1)
CALCIUM SERPL-MCNC: 10.2 MG/DL (ref 8.4–10.2)
CEA SERPL-MCNC: 1.84 NG/ML (ref 0–3)
CHLORIDE SERPL-SCNC: 103 MMOL/L (ref 98–107)
CO2 SERPL-SCNC: 29 MMOL/L (ref 23–31)
CREAT SERPL-MCNC: 0.88 MG/DL (ref 0.55–1.02)
EOSINOPHIL # BLD AUTO: 0.15 X10(3)/MCL (ref 0–0.9)
EOSINOPHIL NFR BLD AUTO: 3.1 %
ERYTHROCYTE [DISTWIDTH] IN BLOOD BY AUTOMATED COUNT: 13.2 % (ref 11.5–17)
GFR SERPLBLD CREATININE-BSD FMLA CKD-EPI: >60 MLS/MIN/1.73/M2
GLOBULIN SER-MCNC: 3.2 GM/DL (ref 2.4–3.5)
GLUCOSE SERPL-MCNC: 139 MG/DL (ref 82–115)
HCT VFR BLD AUTO: 42.4 % (ref 37–47)
HGB BLD-MCNC: 13.8 GM/DL (ref 12–16)
IMM GRANULOCYTES # BLD AUTO: 0.02 X10(3)/MCL (ref 0–0.04)
IMM GRANULOCYTES NFR BLD AUTO: 0.4 %
LYMPHOCYTES # BLD AUTO: 1.26 X10(3)/MCL (ref 0.6–4.6)
LYMPHOCYTES NFR BLD AUTO: 25.7 %
MCH RBC QN AUTO: 28.5 PG (ref 27–31)
MCHC RBC AUTO-ENTMCNC: 32.5 MG/DL (ref 33–36)
MCV RBC AUTO: 87.4 FL (ref 80–94)
MONOCYTES # BLD AUTO: 0.36 X10(3)/MCL (ref 0.1–1.3)
MONOCYTES NFR BLD AUTO: 7.3 %
NEUTROPHILS # BLD AUTO: 3.1 X10(3)/MCL (ref 2.1–9.2)
NEUTROPHILS NFR BLD AUTO: 62.7 %
PLATELET # BLD AUTO: 121 X10(3)/MCL (ref 130–400)
PMV BLD AUTO: 10.2 FL (ref 7.4–10.4)
POTASSIUM SERPL-SCNC: 5 MMOL/L (ref 3.5–5.1)
PROT SERPL-MCNC: 7.2 GM/DL (ref 5.8–7.6)
RBC # BLD AUTO: 4.85 X10(6)/MCL (ref 4.2–5.4)
SODIUM SERPL-SCNC: 141 MMOL/L (ref 136–145)
WBC # SPEC AUTO: 4.9 X10(3)/MCL (ref 4.5–11.5)

## 2022-08-19 PROCEDURE — 85025 COMPLETE CBC W/AUTO DIFF WBC: CPT

## 2022-08-19 PROCEDURE — 80053 COMPREHEN METABOLIC PANEL: CPT

## 2022-08-19 PROCEDURE — 82378 CARCINOEMBRYONIC ANTIGEN: CPT

## 2022-08-19 PROCEDURE — 36415 COLL VENOUS BLD VENIPUNCTURE: CPT

## 2022-08-23 ENCOUNTER — OFFICE VISIT (OUTPATIENT)
Dept: HEMATOLOGY/ONCOLOGY | Facility: CLINIC | Age: 82
End: 2022-08-23
Payer: MEDICARE

## 2022-08-23 VITALS
TEMPERATURE: 98 F | SYSTOLIC BLOOD PRESSURE: 119 MMHG | RESPIRATION RATE: 18 BRPM | BODY MASS INDEX: 28.44 KG/M2 | WEIGHT: 170.69 LBS | DIASTOLIC BLOOD PRESSURE: 61 MMHG | HEART RATE: 69 BPM | OXYGEN SATURATION: 96 % | HEIGHT: 65 IN

## 2022-08-23 DIAGNOSIS — C18.2 MALIGNANT NEOPLASM OF ASCENDING COLON: Primary | ICD-10-CM

## 2022-08-23 PROCEDURE — 99214 OFFICE O/P EST MOD 30 MIN: CPT | Mod: PBBFAC | Performed by: NURSE PRACTITIONER

## 2022-08-23 PROCEDURE — 99999 PR PBB SHADOW E&M-EST. PATIENT-LVL IV: CPT | Mod: PBBFAC,,, | Performed by: NURSE PRACTITIONER

## 2022-08-23 PROCEDURE — 99999 PR PBB SHADOW E&M-EST. PATIENT-LVL IV: ICD-10-PCS | Mod: PBBFAC,,, | Performed by: NURSE PRACTITIONER

## 2022-08-23 PROCEDURE — 99214 PR OFFICE/OUTPT VISIT, EST, LEVL IV, 30-39 MIN: ICD-10-PCS | Mod: S$PBB,,, | Performed by: NURSE PRACTITIONER

## 2022-08-23 PROCEDURE — 99214 OFFICE O/P EST MOD 30 MIN: CPT | Mod: S$PBB,,, | Performed by: NURSE PRACTITIONER

## 2022-08-23 NOTE — PROGRESS NOTES
HEMATOLOGY/ONCOLOGY OFFICE CLINIC VISIT    Visit Information:    Initial Evaluation: 5/26/2021  Referring Provider: Dr. Robert Conner  Other providers:  Code status:  Not addressed    Diagnosis:  T2N0M0-Stage I Colon cancer. Dx on 4/22/21    Present treatment:    Surveillance    Treatment/Oncology history:  Colonoscopy  3/15/2021: malignant partially obstructing tumor in the ascending colon. Biopsy showed tubular adenoma with at least high-grade dysplasia.   Laparoscopic/robotic right hemicolectomy 4/22/2021.    Plan of care: Surveillance      Imaging:  CT A/P 3/17/2021 at [facility]: irregular colonic mass 5 to 6 cm in length proximal to midportion of descending colon.  Enhancing soft tissue component contacts and may invade the right lateral abdominal wall.  No regional or distant lymphadenopathy identified.  Cirrhotic liver with portal hypertension, small volume ascites and splenomegaly.  No focal lesion identified in the liver.    CT of the thorax 4/5/2021: no thoracic metastatic disease identified.  Small volume ascites and borderline splenomegaly.No adenopathy or distant metastases.  CT angiogram 5/20/2021: negative for pulmonary embolism but lungs demonstrate mild heterogeneous attenuation with subtle peripheral reticular opacities.  Primary differential consideration is small airways disease.  Findings may be secondary to multifocal infiltrate from infection versus pulmonary edema.  It also showed moderate ascites with increase in size since prior examination.  Liver with nodular contour and was enlarged.  Spleen borderline enlarged.  A 1.3 x 1 cm enhancing nodule in the left adrenal gland no significant change.  Ultrasound of the abdomen 5/23/2021:cirrhosis of the liver with hepatosplenomegaly and ascites.     Pathology:  4/22/2022:   RIGHT COLON, HEMICOLECTOMY: ADENOCARCINOMA, WELL DIFFERENTIATED, 90% MUCINOUS.   --  Greatest tumor dimension, 7.0 cm (as measured grossly).  --  Adenocarcinoma arises from  tubular adenoma with high grade dysplasia.  --  Lymphovascular invasion, not identified.  --  Adenocarcinoma superficially invades muscularis propria.  --  Eighteen mesenteric lymph nodes, no neoplasm (0/18).  --  Surgical margins, uninvolved.  POSTOPERATIVE SPINDLE CELL PSEUDOTUMOR, 1.5 CM.  -  The pseudotumor extends into mesenteric adipose tissue.   9/24/2021:  LIVER, CORE NEEDLE BIOPSY: FOCAL PORTAL TRIADITIS WITH SINUS DILATION.   - NO INTERFACE ACTIVITY.   - BRIDING FIBROSIS (FIBROSIS STAGE  F2-3)  - NO STEATOSIS.  Comment: Despite hepatitis testing demonstrating reactivity for hepatitis A in July 2021, no evidence of acute hepatitis is identified and LFTs are currently within normal limits. This case has also been reviewed in intradepartmental consultation.        CLINICAL HISTORY:       Patient: 82 year old female with multiple medical problems kindly referred for colon cancer.  She initially presented with anemia and fecal occult blood positive.  She also reports 20 pounds weight loss over the last previous 6 months. Colonoscopy on 3/15/2021 revealed a malignant partially obstructing tumor in the ascending colon. Biopsy showed tubular adenoma with at least high-grade dysplasia.  On 3/17/2021 she underwent a CT scan of the abdomen and pelvis at [facility] that showed an irregular colonic mass measuring about 5 to 6 cm in length involving proximal to midportion of descending colon.  Enhancing soft tissue component contacts and may invade the right lateral abdominal wall.  No regional or distant lymphadenopathy identified.  Cirrhotic liver with portal hypertension, small volume ascites and splenomegaly.  No focal lesion identified in the liver.  Staging CT of the thorax 4/5/2021 with no thoracic metastatic disease identified.  Small volume ascites and borderline splenomegaly.No adenopathy or distant metastases.     Patient was seen by Dr. Robert Conner and she underwent laparoscopic/robotic right  hemicolectomy on 4/22/2021.  Pathology report as follows:   RIGHT COLON, HEMICOLECTOMY: ADENOCARCINOMA, WELL DIFFERENTIATED, 90% MUCINOUS. 0/18 LN positive for malignanacy.See above for details.    Patient recovered from the surgery but came back to the emergency department for chest pain. She underwent CT angiogram that was negative for pulmonary embolism but lungs demonstrate mild heterogeneous attenuation with subtle peripheral reticular opacities.  Primary differential consideration is small airways disease.  Findings may be secondary to multifocal infiltrate from infection versus pulmonary edema.  It also showed moderate ascites with increase in size since prior examination.  Liver with nodular contour and was enlarged.  Spleen borderline enlarged.  1.3 x 1 cm enhancing nodule in the left adrenal gland no significant change.    She underwent ultrasound of the abdomen that confirmed the diagnosis of cirrhosis of the liver with hepatosplenomegaly and ascites.  She was discharged on 5/20/2021.    She stopped drinking at age 38, she says that she is a social drinker. She denies any family history of colon cancer. She denies any fever, chills, or sweats. No chest pain or shortness of breath.    She had liver biopsy ordered by Dr. Louis on 9/24/21. It was ordered for cirrhosis. She has no history of hepatitis infection or alcohol abuse. Biopsy was negative for malignancy. Just  fibrosis.              Chief Complaint: No chief complaint on file.      Interval History:  Patient presents today for f/u in surveillance for colon cancer. (Stage 1 diagnosed in April 2021), daughter present. Patient has been doing well, has not been having any problems. She had colonoscopy on 3/29/22 with Dr. Louis, 1 tiny polyp removed (according to patient). Polyp was not sent off to pathology.     ROS:  All 14 points ROS taken and as per Interval History    Histories:  PMH/PSH/FH/SOCIAL/ALLERGIES AND MEDS REVIEWED AND UPDATED AS  "APPROPRIATE       Vitals:    08/23/22 1442   BP: 119/61   BP Location: Left arm   Patient Position: Sitting   BP Method: Medium (Automatic)   Pulse: 69   Resp: 18   Temp: 98 °F (36.7 °C)   TempSrc: Oral   SpO2: 96%   Weight: 77.4 kg (170 lb 11.2 oz)   Height: 5' 5" (1.651 m)      Physical Exam  Constitutional:       Appearance: Normal appearance.   HENT:      Head: Normocephalic and atraumatic.      Nose: Nose normal.      Mouth/Throat:      Mouth: Mucous membranes are moist.      Pharynx: Oropharynx is clear.   Eyes:      Comments: Right eye subconjunctival hemorrhage   Cardiovascular:      Rate and Rhythm: Normal rate and regular rhythm.   Pulmonary:      Breath sounds: Normal breath sounds.   Abdominal:      Palpations: Abdomen is soft.   Musculoskeletal:         General: Normal range of motion.      Cervical back: Normal range of motion and neck supple.   Skin:     General: Skin is warm and dry.      Capillary Refill: Capillary refill takes less than 2 seconds.   Neurological:      General: No focal deficit present.      Mental Status: She is alert.   Psychiatric:         Mood and Affect: Mood normal.       ECOG SCORE           Laboratory:  CBC with Differential:  Lab Results   Component Value Date    WBC 4.9 08/19/2022    RBC 4.85 08/19/2022    HGB 13.8 08/19/2022    HCT 42.4 08/19/2022    MCV 87.4 08/19/2022    MCH 28.5 08/19/2022    MCHC 32.5 (L) 08/19/2022    RDW 13.2 08/19/2022     (L) 08/19/2022    MPV 10.2 08/19/2022        CMP:  Sodium Level   Date Value Ref Range Status   08/19/2022 141 136 - 145 mmol/L Final     Potassium Level   Date Value Ref Range Status   08/19/2022 5.0 3.5 - 5.1 mmol/L Final     Carbon Dioxide   Date Value Ref Range Status   08/19/2022 29 23 - 31 mmol/L Final     Blood Urea Nitrogen   Date Value Ref Range Status   08/19/2022 14.9 9.8 - 20.1 mg/dL Final     Creatinine   Date Value Ref Range Status   08/19/2022 0.88 0.55 - 1.02 mg/dL Final     Calcium Level Total   Date Value " Ref Range Status   08/19/2022 10.2 8.4 - 10.2 mg/dL Final     Albumin Level   Date Value Ref Range Status   08/19/2022 4.0 3.4 - 4.8 gm/dL Final     Bilirubin Total   Date Value Ref Range Status   08/19/2022 0.9 <=1.5 mg/dL Final     Alkaline Phosphatase   Date Value Ref Range Status   08/19/2022 52 40 - 150 unit/L Final     Aspartate Aminotransferase   Date Value Ref Range Status   08/19/2022 23 5 - 34 unit/L Final     Alanine Aminotransferase   Date Value Ref Range Status   08/19/2022 21 0 - 55 unit/L Final     Estimated GFR-Non    Date Value Ref Range Status   05/20/2022 >60 mls/min/1.73/m2 Final             Assessment:       1. Malignant neoplasm of ascending colon    1) pT2pN0M0--Stage I colon cancer--diagnosed in April 2021   NCCN guidelines (COL-3) for pathological stage T2, N0, M0   Surveillance (COL-8): colonoscopy in 1 year after surgery and then in 1 year for advanced adenoma, if no advanced adenoma then repeat in 3 yrs and then q 5 yrs.  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  These are benign lesions.  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   Colonoscopy due in 3 yrs-3/2025 but would like to have it repeated one year after last one since polyp was not sent to pathology.  RTC in 3 month with labs: CBC, CMP, CEA    Encouraged to call for any questions or problems  The patient was given ample opportunity to ask questions and they were all answered to satisfaction; patient demonstrated understanding of what we discussed and is agreeable to the plan.        ARUN Arreguin      I have seen and examined the patient this day.  I have reviewed medical records and I have discussed  case in details with Nurse Practitioner.  I have participated in all elements of the history, physical and treatment  planning of this patient.  Reason for the visit, chief complaint, history of present illness, past medical, past surgical, family and social history as well as allergies current medications and review of systems were all review and I agree with  practitioner assessment as well.  I have reviewed the lab and imaging studies myself  and I agree with above practioner's  plan.       VINOD QUINTANILLA MD

## 2022-09-06 RX ORDER — SPIRONOLACTONE 25 MG/1
12.5 TABLET ORAL DAILY
Qty: 90 TABLET | Refills: 3 | Status: SHIPPED | OUTPATIENT
Start: 2022-09-06 | End: 2023-12-07 | Stop reason: SDUPTHER

## 2022-09-26 DIAGNOSIS — R94.5 LIVER FUNCTION STUDY, ABNORMAL: Primary | ICD-10-CM

## 2022-09-26 DIAGNOSIS — D50.0 IRON DEFICIENCY ANEMIA SECONDARY TO BLOOD LOSS (CHRONIC): ICD-10-CM

## 2022-09-26 DIAGNOSIS — K74.60 CIRRHOSIS: ICD-10-CM

## 2022-10-03 RX ORDER — PEN NEEDLE, DIABETIC 30 GX3/16"
100 NEEDLE, DISPOSABLE MISCELLANEOUS DAILY
Qty: 100 EACH | Refills: 11 | Status: SHIPPED | OUTPATIENT
Start: 2022-10-03 | End: 2023-06-06 | Stop reason: SDUPTHER

## 2022-10-06 ENCOUNTER — HOSPITAL ENCOUNTER (OUTPATIENT)
Dept: RADIOLOGY | Facility: HOSPITAL | Age: 82
Discharge: HOME | End: 2022-10-06
Attending: INTERNAL MEDICINE
Payer: MEDICARE

## 2022-10-06 DIAGNOSIS — R94.5 LIVER FUNCTION STUDY, ABNORMAL: ICD-10-CM

## 2022-10-06 DIAGNOSIS — K74.60 CIRRHOSIS: ICD-10-CM

## 2022-10-06 DIAGNOSIS — D50.0 IRON DEFICIENCY ANEMIA SECONDARY TO BLOOD LOSS (CHRONIC): ICD-10-CM

## 2022-10-06 PROCEDURE — 76700 US EXAM ABDOM COMPLETE: CPT | Mod: TC

## 2022-10-31 PROBLEM — Z00.00 MEDICARE ANNUAL WELLNESS VISIT, SUBSEQUENT: Status: RESOLVED | Noted: 2022-07-27 | Resolved: 2022-10-31

## 2022-11-17 ENCOUNTER — OFFICE VISIT (OUTPATIENT)
Dept: HEMATOLOGY/ONCOLOGY | Facility: CLINIC | Age: 82
End: 2022-11-17
Payer: MEDICARE

## 2022-11-17 VITALS
HEART RATE: 76 BPM | WEIGHT: 171.88 LBS | SYSTOLIC BLOOD PRESSURE: 147 MMHG | BODY MASS INDEX: 29.34 KG/M2 | RESPIRATION RATE: 18 BRPM | DIASTOLIC BLOOD PRESSURE: 79 MMHG | OXYGEN SATURATION: 97 % | TEMPERATURE: 98 F | HEIGHT: 64 IN

## 2022-11-17 DIAGNOSIS — C18.2 MALIGNANT NEOPLASM OF ASCENDING COLON: Primary | ICD-10-CM

## 2022-11-17 DIAGNOSIS — R59.0 LOCALIZED ENLARGED LYMPH NODES: ICD-10-CM

## 2022-11-17 PROCEDURE — 99214 PR OFFICE/OUTPT VISIT, EST, LEVL IV, 30-39 MIN: ICD-10-PCS | Mod: S$PBB,,, | Performed by: INTERNAL MEDICINE

## 2022-11-17 PROCEDURE — 99999 PR PBB SHADOW E&M-EST. PATIENT-LVL IV: ICD-10-PCS | Mod: PBBFAC,,, | Performed by: INTERNAL MEDICINE

## 2022-11-17 PROCEDURE — 99214 OFFICE O/P EST MOD 30 MIN: CPT | Mod: PBBFAC | Performed by: INTERNAL MEDICINE

## 2022-11-17 PROCEDURE — 99214 OFFICE O/P EST MOD 30 MIN: CPT | Mod: S$PBB,,, | Performed by: INTERNAL MEDICINE

## 2022-11-17 PROCEDURE — 99999 PR PBB SHADOW E&M-EST. PATIENT-LVL IV: CPT | Mod: PBBFAC,,, | Performed by: INTERNAL MEDICINE

## 2022-11-17 RX ORDER — FAMOTIDINE 40 MG/1
40 TABLET, FILM COATED ORAL NIGHTLY
COMMUNITY
Start: 2022-10-26

## 2022-11-17 NOTE — PROGRESS NOTES
HEMATOLOGY/ONCOLOGY OFFICE CLINIC VISIT    Visit Information:    Initial Evaluation: 5/26/2021  Referring Provider: Dr. Robert Conner  Other providers:  Code status:  Not addressed    Diagnosis:  T2N0M0-Stage I Colon cancer. Dx on 4/22/21    Present treatment:    Surveillance    Treatment/Oncology history:  3/15/2021:Colonoscopy: malignant partially obstructing tumor in the ascending colon. Biopsy tubular adenoma with at least high-grade dysplasia.   4/22/2021: Laparoscopic/robotic right hemicolectomy     Plan of care: Surveillance      Imaging:  CT A/P 3/17/2021 at [facility]: irregular colonic mass 5 to 6 cm in length proximal to midportion of descending colon.  Enhancing soft tissue component contacts and may invade the right lateral abdominal wall.  No regional or distant lymphadenopathy identified.  Cirrhotic liver with portal hypertension, small volume ascites and splenomegaly.  No focal lesion identified in the liver.    CT of the thorax 4/5/2021: no thoracic metastatic disease identified.  Small volume ascites and borderline splenomegaly.No adenopathy or distant metastases.  CT angiogram 5/20/2021: negative for pulmonary embolism but lungs demonstrate mild heterogeneous attenuation with subtle peripheral reticular opacities.  Primary differential consideration is small airways disease.  Findings may be secondary to multifocal infiltrate from infection versus pulmonary edema.  It also showed moderate ascites with increase in size since prior examination.  Liver with nodular contour and was enlarged.  Spleen borderline enlarged.  A 1.3 x 1 cm enhancing nodule in the left adrenal gland no significant change.  Ultrasound of the abdomen 5/23/2021:cirrhosis of the liver with hepatosplenomegaly and ascites.   Ultrasound of the abdomen 10/26/2022: The pancreas appears grossly unremarkable.  No pancreatic mass or lesion is seen. liver is slightly enlarged in size. Liver is echogenic it measures 16 cm by my  measurements..  No liver mass or lesion is seen. spleen appears enlarged and measures 14.5 cm.  Hepatomegaly with findings consistent with hepatic steatosis       Pathology:  4/22/2022:   RIGHT COLON, HEMICOLECTOMY: ADENOCARCINOMA, WELL DIFFERENTIATED, 90% MUCINOUS.   --  Greatest tumor dimension, 7.0 cm (as measured grossly).  --  Adenocarcinoma arises from tubular adenoma with high grade dysplasia.  --  Lymphovascular invasion, not identified.  --  Adenocarcinoma superficially invades muscularis propria.  --  Eighteen mesenteric lymph nodes, no neoplasm (0/18).  --  Surgical margins, uninvolved.  POSTOPERATIVE SPINDLE CELL PSEUDOTUMOR, 1.5 CM.  -  The pseudotumor extends into mesenteric adipose tissue.   9/24/2021:  LIVER, CORE NEEDLE BIOPSY: FOCAL PORTAL TRIADITIS WITH SINUS DILATION.   - NO INTERFACE ACTIVITY.   - BRIDING FIBROSIS (FIBROSIS STAGE  F2-3)  - NO STEATOSIS.  Comment: Despite hepatitis testing demonstrating reactivity for hepatitis A in July 2021, no evidence of acute hepatitis is identified and LFTs are currently within normal limits. This case has also been reviewed in intradepartmental consultation.        CLINICAL HISTORY:       Patient: 82 year old female with multiple medical problems kindly referred for colon cancer.  She initially presented with anemia and fecal occult blood positive.  She also reports 20 pounds weight loss over the last previous 6 months. Colonoscopy on 3/15/2021 revealed a malignant partially obstructing tumor in the ascending colon. Biopsy showed tubular adenoma with at least high-grade dysplasia.  On 3/17/2021 she underwent a CT scan of the abdomen and pelvis at [facility] that showed an irregular colonic mass measuring about 5 to 6 cm in length involving proximal to midportion of descending colon.  Enhancing soft tissue component contacts and may invade the right lateral abdominal wall.  No regional or distant lymphadenopathy identified.  Cirrhotic liver with  portal hypertension, small volume ascites and splenomegaly.  No focal lesion identified in the liver.  Staging CT of the thorax 4/5/2021 with no thoracic metastatic disease identified.  Small volume ascites and borderline splenomegaly.No adenopathy or distant metastases.     Patient was seen by Dr. Robert Conner and she underwent laparoscopic/robotic right hemicolectomy on 4/22/2021.  Pathology report as follows:   RIGHT COLON, HEMICOLECTOMY: ADENOCARCINOMA, WELL DIFFERENTIATED, 90% MUCINOUS. 0/18 LN positive for malignanacy.See above for details.    Patient recovered from the surgery but came back to the emergency department for chest pain. She underwent CT angiogram that was negative for pulmonary embolism but lungs demonstrate mild heterogeneous attenuation with subtle peripheral reticular opacities.  Primary differential consideration is small airways disease.  Findings may be secondary to multifocal infiltrate from infection versus pulmonary edema.  It also showed moderate ascites with increase in size since prior examination.  Liver with nodular contour and was enlarged.  Spleen borderline enlarged.  1.3 x 1 cm enhancing nodule in the left adrenal gland no significant change.    She underwent ultrasound of the abdomen that confirmed the diagnosis of cirrhosis of the liver with hepatosplenomegaly and ascites.  She was discharged on 5/20/2021.    She stopped drinking at age 38, she says that she is a social drinker. She denies any family history of colon cancer. She denies any fever, chills, or sweats. No chest pain or shortness of breath.    She had liver biopsy ordered by Dr. Louis on 9/24/21. It was ordered for cirrhosis. She has no history of hepatitis infection or alcohol abuse. Biopsy was negative for malignancy. Just  fibrosis.              Chief Complaint: no complaints today      Interval History:  Patient presents today for f/u in surveillance for colon cancer. (Stage 1 diagnosed in April 2021),  "daughter present. Patient has been doing well, has not been having any problems. She had colonoscopy on 3/29/22 with Dr. Louis, 1 tiny polyp removed (according to patient). Polyp was not sent off to pathology. Patient and daughter reports that Dr Louis saw them and he was not worried about the polyp. It was extremely small and basically collapse when removed so not much tissue to send to pathology. Looked benign and he will have repeat colonoscopy in 2 years.     Otherwise she is doing well and denies any fever, chills, sweats.  No chest pain or short of breath.  She is still active.    ROS:  All 14 points ROS taken and as per Interval History    Histories:  PMH/PSH/FH/SOCIAL/ALLERGIES AND MEDS REVIEWED AND UPDATED AS APPROPRIATE       Vitals:    11/17/22 0947   BP: (!) 147/79   BP Location: Left arm   Patient Position: Sitting   BP Method: Medium (Automatic)   Pulse: 76   Resp: 18   Temp: 97.9 °F (36.6 °C)   TempSrc: Oral   SpO2: 97%   Weight: 78 kg (171 lb 14.4 oz)   Height: 5' 4" (1.626 m)      Physical Exam  Constitutional:       Appearance: Normal appearance.   HENT:      Head: Normocephalic and atraumatic.      Nose: Nose normal.      Mouth/Throat:      Mouth: Mucous membranes are moist.      Pharynx: Oropharynx is clear.   Eyes:      Comments: Right eye subconjunctival hemorrhage   Cardiovascular:      Rate and Rhythm: Normal rate and regular rhythm.   Pulmonary:      Breath sounds: Normal breath sounds.   Abdominal:      Palpations: Abdomen is soft.   Musculoskeletal:         General: Normal range of motion.      Cervical back: Normal range of motion and neck supple.   Skin:     General: Skin is warm and dry.      Capillary Refill: Capillary refill takes less than 2 seconds.   Neurological:      General: No focal deficit present.      Mental Status: She is alert.   Psychiatric:         Mood and Affect: Mood normal.     ECOG SCORE    0 - Fully active-able to carry on all pre-disease performance without " restriction         Laboratory:  CBC with Differential:  Lab Results   Component Value Date    WBC 4.6 11/17/2022    RBC 4.89 11/17/2022    HGB 13.6 11/17/2022    HCT 43.8 11/17/2022    MCV 89.6 11/17/2022    MCH 27.8 11/17/2022    MCHC 31.1 (L) 11/17/2022    RDW 13.4 11/17/2022     (L) 11/17/2022    MPV 11.0 (H) 11/17/2022        CMP:  Sodium Level   Date Value Ref Range Status   11/17/2022 138 136 - 145 mmol/L Final     Potassium Level   Date Value Ref Range Status   11/17/2022 4.9 3.5 - 5.1 mmol/L Final     Carbon Dioxide   Date Value Ref Range Status   11/17/2022 27 23 - 31 mmol/L Final     Blood Urea Nitrogen   Date Value Ref Range Status   11/17/2022 17.8 9.8 - 20.1 mg/dL Final     Creatinine   Date Value Ref Range Status   11/17/2022 0.87 0.55 - 1.02 mg/dL Final     Calcium Level Total   Date Value Ref Range Status   11/17/2022 10.1 8.4 - 10.2 mg/dL Final     Albumin Level   Date Value Ref Range Status   11/17/2022 3.9 3.4 - 4.8 gm/dL Final     Bilirubin Total   Date Value Ref Range Status   11/17/2022 0.8 <=1.5 mg/dL Final     Alkaline Phosphatase   Date Value Ref Range Status   11/17/2022 61 40 - 150 unit/L Final     Aspartate Aminotransferase   Date Value Ref Range Status   11/17/2022 22 5 - 34 unit/L Final     Alanine Aminotransferase   Date Value Ref Range Status   11/17/2022 18 0 - 55 unit/L Final     Estimated GFR-Non    Date Value Ref Range Status   05/20/2022 >60 mls/min/1.73/m2 Final             Assessment:       1. Malignant neoplasm of ascending colon    2. Localized enlarged lymph nodes      1) pT2pN0M0--Stage I colon cancer--diagnosed in April 2021   NCCN guidelines (COL-3) for pathological stage T2, N0, M0   Surveillance (COL-8): colonoscopy in 1 year after surgery and then in 1 year for advanced adenoma, if no advanced adenoma then repeat in 3 yrs and then q 5 yrs.  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  These are benign lesions.  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 She has not have any recent scan. She asked if she truly need them due to her age. Discussed NCCN guidelines for surveillance. She is a "young" 82, active with a very good PS and she is agree to do scans.   Agree with Colonoscopy due in 2 yrs--3/2024    RTC in 3 month with labs: CBC, CMP, CEA  CT C/A/P  Labs: CBC, CMP, CEA  Keep appt with GI, Dr Louis  Encouraged to call for any questions or problems  The patient was given ample opportunity to ask questions and they were all answered to satisfaction; patient demonstrated understanding of what we discussed and is agreeable to the plan.        VINOD QUINTANILLA MD         "

## 2022-11-21 RX ORDER — ESCITALOPRAM OXALATE 10 MG/1
10 TABLET ORAL DAILY
Qty: 90 TABLET | Refills: 3 | Status: SHIPPED | OUTPATIENT
Start: 2022-11-21 | End: 2023-08-28 | Stop reason: SDUPTHER

## 2022-11-21 RX ORDER — LEVOTHYROXINE SODIUM 75 UG/1
75 TABLET ORAL DAILY
Qty: 90 TABLET | Refills: 3 | Status: SHIPPED | OUTPATIENT
Start: 2022-11-21 | End: 2023-11-16 | Stop reason: SDUPTHER

## 2022-11-21 NOTE — TELEPHONE ENCOUNTER
----- Message from Sveta Pierson sent at 11/21/2022 10:12 AM CST -----  Regarding: Med refill  .Type:  RX Refill Request    Who Called: Pt  Refill or New Rx:Refill  RX Name and Strength:EScitalopram oxalate (LEXAPRO) 10 MG tablet  How is the patient currently taking it? (ex. 1XDay):1xday  Is this a 30 day or 90 day RX:90  Preferred Pharmacy with phone number:[id number] - [place], [place] - [address] AT [address]  Local or Mail Order:Local  Ordering Provider:Allison  Would the patient rather a call back or a response via [employer]? Call back  Best Call Back Number:[phone]  Additional Information: Pt states she's been having trouble getting meds filled since last week.    .Type:  RX Refill Request    Who Called: Pt  Refill or New Rx:Refill  RX Name and Strength:levothyroxine (SYNTHROID) 75 MCG tablet  How is the patient currently taking it? (ex. 1XDay):1xday  Is this a 30 day or 90 day RX:  Preferred Pharmacy with phone number:[id number] - [place], [place] - [address]  AT [address]  Local or Mail Order:Local  Ordering Provider:Allison  Would the patient rather a call back or a response via [employer]? Call back  Best Call Back Number:[phone]  Additional Information: Pt states she's been having trouble getting meds filled since last week.

## 2023-01-05 ENCOUNTER — TELEPHONE (OUTPATIENT)
Dept: FAMILY MEDICINE | Facility: CLINIC | Age: 83
End: 2023-01-05
Payer: MEDICARE

## 2023-01-05 NOTE — TELEPHONE ENCOUNTER
Is she in [place] now?  Where did she test positive for covid?  If it was at a [facility], they should have prescribed her appropriate medications. If it was at home, she needs to be evaluated first - either with our office, wellness center or c.  I do not normally prescribe medication without evaluating patients.  We can do a telemed if needed...    An antibiotic will not treat covid as it is a virus.  There are decongestants otc. Since she has htn, I would recommend coricidin otc as this should not raise her bp.  Encourage fluids and rest. Er precautions.

## 2023-02-16 ENCOUNTER — HOSPITAL ENCOUNTER (OUTPATIENT)
Dept: RADIOLOGY | Facility: HOSPITAL | Age: 83
Discharge: HOME | End: 2023-02-16
Attending: INTERNAL MEDICINE
Payer: MEDICARE

## 2023-02-16 DIAGNOSIS — R59.0 LOCALIZED ENLARGED LYMPH NODES: ICD-10-CM

## 2023-02-16 DIAGNOSIS — C18.2 MALIGNANT NEOPLASM OF ASCENDING COLON: ICD-10-CM

## 2023-02-16 LAB
CREAT SERPL-MCNC: 0.9 MG/DL (ref 0.5–1.4)
SAMPLE: NORMAL

## 2023-02-16 PROCEDURE — 25500020 PHARM REV CODE 255: Performed by: INTERNAL MEDICINE

## 2023-02-16 PROCEDURE — 71260 CT THORAX DX C+: CPT | Mod: TC

## 2023-02-16 PROCEDURE — 74177 CT ABD & PELVIS W/CONTRAST: CPT | Mod: TC

## 2023-02-16 RX ADMIN — IOPAMIDOL 100 ML: 755 INJECTION, SOLUTION INTRAVENOUS at 09:02

## 2023-02-27 ENCOUNTER — OFFICE VISIT (OUTPATIENT)
Dept: HEMATOLOGY/ONCOLOGY | Facility: CLINIC | Age: 83
End: 2023-02-27
Payer: MEDICARE

## 2023-02-27 VITALS
DIASTOLIC BLOOD PRESSURE: 77 MMHG | OXYGEN SATURATION: 97 % | TEMPERATURE: 98 F | BODY MASS INDEX: 29.19 KG/M2 | HEART RATE: 80 BPM | HEIGHT: 64 IN | SYSTOLIC BLOOD PRESSURE: 138 MMHG | WEIGHT: 171 LBS

## 2023-02-27 DIAGNOSIS — C18.2 MALIGNANT NEOPLASM OF ASCENDING COLON: Primary | ICD-10-CM

## 2023-02-27 PROCEDURE — 99214 PR OFFICE/OUTPT VISIT, EST, LEVL IV, 30-39 MIN: ICD-10-PCS | Mod: S$PBB,,, | Performed by: INTERNAL MEDICINE

## 2023-02-27 PROCEDURE — 99999 PR PBB SHADOW E&M-EST. PATIENT-LVL IV: ICD-10-PCS | Mod: PBBFAC,,, | Performed by: INTERNAL MEDICINE

## 2023-02-27 PROCEDURE — 99999 PR PBB SHADOW E&M-EST. PATIENT-LVL IV: CPT | Mod: PBBFAC,,, | Performed by: INTERNAL MEDICINE

## 2023-02-27 PROCEDURE — 99214 OFFICE O/P EST MOD 30 MIN: CPT | Mod: PBBFAC | Performed by: INTERNAL MEDICINE

## 2023-02-27 PROCEDURE — 99214 OFFICE O/P EST MOD 30 MIN: CPT | Mod: S$PBB,,, | Performed by: INTERNAL MEDICINE

## 2023-02-27 NOTE — PROGRESS NOTES
HEMATOLOGY/ONCOLOGY OFFICE CLINIC VISIT    Visit Information:    Initial Evaluation: 5/26/2021  Referring Provider: Dr. Robert Conner  Other providers:  Code status:  Not addressed    Diagnosis:  T2N0M0-Stage I Colon cancer. Dx on 4/22/21    Present treatment:    Surveillance    Treatment/Oncology history:  3/15/2021:Colonoscopy: malignant partially obstructing tumor in the ascending colon. Biopsy tubular adenoma with at least high-grade dysplasia.   4/22/2021: Laparoscopic/robotic right hemicolectomy     Plan of care: Surveillance      Imaging:  CT A/P 3/17/2021 at [facility]: irregular colonic mass 5 to 6 cm in length proximal to midportion of descending colon.  Enhancing soft tissue component contacts and may invade the right lateral abdominal wall.  No regional or distant lymphadenopathy identified.  Cirrhotic liver with portal hypertension, small volume ascites and splenomegaly.  No focal lesion identified in the liver.    CT of the thorax 4/5/2021: no thoracic metastatic disease identified.  Small volume ascites and borderline splenomegaly.No adenopathy or distant metastases.  CT angiogram 5/20/2021: negative for pulmonary embolism but lungs demonstrate mild heterogeneous attenuation with subtle peripheral reticular opacities.  Primary differential consideration is small airways disease.  Findings may be secondary to multifocal infiltrate from infection versus pulmonary edema.  It also showed moderate ascites with increase in size since prior examination.  Liver with nodular contour and was enlarged.  Spleen borderline enlarged.  A 1.3 x 1 cm enhancing nodule in the left adrenal gland no significant change.  Ultrasound of the abdomen 5/23/2021:cirrhosis of the liver with hepatosplenomegaly and ascites.   Ultrasound of the abdomen 10/26/2022: The pancreas appears grossly unremarkable.  No pancreatic mass or lesion is seen. liver is slightly enlarged in size. Liver is echogenic it measures 16 cm by my  measurements..  No liver mass or lesion is seen. spleen appears enlarged and measures 14.5 cm.  Hepatomegaly with findings consistent with hepatic steatosis  CT C/A/P 2/16/2023:     1. No evidence of metastatic disease within the chest, abdomen and pelvis  2. Cirrhotic morphology of the liver  3. Changes of portal hypertension including borderline splenomegaly and portosystemic collateral  4. Mild interstitial scarring within the lungs.       Pathology:  4/22/2022:   RIGHT COLON, HEMICOLECTOMY: ADENOCARCINOMA, WELL DIFFERENTIATED, 90% MUCINOUS.   --  Greatest tumor dimension, 7.0 cm (as measured grossly).  --  Adenocarcinoma arises from tubular adenoma with high grade dysplasia.  --  Lymphovascular invasion, not identified.  --  Adenocarcinoma superficially invades muscularis propria.  --  Eighteen mesenteric lymph nodes, no neoplasm (0/18).  --  Surgical margins, uninvolved.  POSTOPERATIVE SPINDLE CELL PSEUDOTUMOR, 1.5 CM.  -  The pseudotumor extends into mesenteric adipose tissue.   9/24/2021:  LIVER, CORE NEEDLE BIOPSY: FOCAL PORTAL TRIADITIS WITH SINUS DILATION.   - NO INTERFACE ACTIVITY.   - BRIDING FIBROSIS (FIBROSIS STAGE  F2-3)  - NO STEATOSIS.  Comment: Despite hepatitis testing demonstrating reactivity for hepatitis A in July 2021, no evidence of acute hepatitis is identified and LFTs are currently within normal limits. This case has also been reviewed in intradepartmental consultation.      CLINICAL HISTORY:       Patient: 82 year old female with multiple medical problems kindly referred for colon cancer.  She initially presented with anemia and fecal occult blood positive.  She also reports 20 pounds weight loss over the last previous 6 months. Colonoscopy on 3/15/2021 revealed a malignant partially obstructing tumor in the ascending colon. Biopsy showed tubular adenoma with at least high-grade dysplasia.  On 3/17/2021 she underwent a CT scan of the abdomen and pelvis at [facility] that showed an  irregular colonic mass measuring about 5 to 6 cm in length involving proximal to midportion of descending colon.  Enhancing soft tissue component contacts and may invade the right lateral abdominal wall.  No regional or distant lymphadenopathy identified.  Cirrhotic liver with portal hypertension, small volume ascites and splenomegaly.  No focal lesion identified in the liver.  Staging CT of the thorax 4/5/2021 with no thoracic metastatic disease identified.  Small volume ascites and borderline splenomegaly.No adenopathy or distant metastases.     Patient was seen by Dr. Robert Conner and she underwent laparoscopic/robotic right hemicolectomy on 4/22/2021.  Pathology report as follows:   RIGHT COLON, HEMICOLECTOMY: ADENOCARCINOMA, WELL DIFFERENTIATED, 90% MUCINOUS. 0/18 LN positive for malignanacy.See above for details.    Patient recovered from the surgery but came back to the emergency department for chest pain. She underwent CT angiogram that was negative for pulmonary embolism but lungs demonstrate mild heterogeneous attenuation with subtle peripheral reticular opacities.  Primary differential consideration is small airways disease.  Findings may be secondary to multifocal infiltrate from infection versus pulmonary edema.  It also showed moderate ascites with increase in size since prior examination.  Liver with nodular contour and was enlarged.  Spleen borderline enlarged.  1.3 x 1 cm enhancing nodule in the left adrenal gland no significant change.    She underwent ultrasound of the abdomen that confirmed the diagnosis of cirrhosis of the liver with hepatosplenomegaly and ascites.  She was discharged on 5/20/2021.    She stopped drinking at age 38, she says that she is a social drinker. She denies any family history of colon cancer. She denies any fever, chills, or sweats. No chest pain or shortness of breath.    She had liver biopsy ordered by Dr. Louis on 9/24/21. It was ordered for cirrhosis. She has no  "history of hepatitis infection or alcohol abuse. Biopsy was negative for malignancy. Just  fibrosis.              Chief Complaint: No Concerns today        Interval History:  Patient presents today for follow up in surveillance for colon cancer and to discuss CT scan results. She is with her daughter.  Patient has been doing well, has not been having any problems. She will have repeat colonoscopy in 2 years-2024.   Otherwise she is doing well and denies any fever, chills, sweats.  No chest pain or short of breath.  She is still active.    ROS: All 14 points ROS taken and as per Interval History  Review of Systems   Constitutional:  Negative for chills, fever and weight loss.   HENT:  Negative for congestion and nosebleeds.    Eyes:  Negative for blurred vision, double vision and photophobia.   Respiratory:  Negative for cough, hemoptysis and shortness of breath.    Cardiovascular:  Negative for chest pain, palpitations, leg swelling and PND.   Gastrointestinal:  Negative for abdominal pain, blood in stool, constipation, diarrhea, melena, nausea and vomiting.   Genitourinary:  Negative for dysuria, frequency, hematuria and urgency.   Musculoskeletal:  Negative for back pain, falls and myalgias.   Skin:  Negative for itching and rash.   Neurological:  Negative for tremors, focal weakness, seizures, weakness and headaches.   Endo/Heme/Allergies:  Negative for environmental allergies. Does not bruise/bleed easily.   Psychiatric/Behavioral:  Negative for depression and suicidal ideas. The patient is not nervous/anxious.        Histories:  PMH/PSH/FH/SOCIAL/ALLERGIES AND MEDS REVIEWED AND UPDATED AS APPROPRIATE       Vitals:    02/27/23 1041   BP: 138/77   BP Location: Right arm   Patient Position: Sitting   Pulse: 80   Temp: 97.5 °F (36.4 °C)   TempSrc: Oral   SpO2: 97%   Weight: 77.6 kg (171 lb)   Height: 5' 4" (1.626 m)      Physical Exam  Constitutional:       Appearance: Normal appearance.   HENT:      Head: " Normocephalic and atraumatic.      Nose: Nose normal.      Mouth/Throat:      Mouth: Mucous membranes are moist.      Pharynx: Oropharynx is clear.   Eyes:      Comments: Right eye subconjunctival hemorrhage   Cardiovascular:      Rate and Rhythm: Normal rate and regular rhythm.   Pulmonary:      Breath sounds: Normal breath sounds.   Abdominal:      Palpations: Abdomen is soft.   Musculoskeletal:         General: Normal range of motion.      Cervical back: Normal range of motion and neck supple.   Skin:     General: Skin is warm and dry.      Capillary Refill: Capillary refill takes less than 2 seconds.   Neurological:      General: No focal deficit present.      Mental Status: She is alert.   Psychiatric:         Mood and Affect: Mood normal.     ECOG SCORE             Laboratory:  CBC with Differential:  Lab Results   Component Value Date    WBC 4.4 (L) 02/16/2023    RBC 4.59 02/16/2023    HGB 12.9 02/16/2023    HCT 40.8 02/16/2023    MCV 88.9 02/16/2023    MCH 28.1 02/16/2023    MCHC 31.6 (L) 02/16/2023    RDW 13.9 02/16/2023     (L) 02/16/2023    MPV 10.5 (H) 02/16/2023        CMP:  Sodium Level   Date Value Ref Range Status   02/16/2023 142 136 - 145 mmol/L Final     Potassium Level   Date Value Ref Range Status   02/16/2023 5.0 3.5 - 5.1 mmol/L Final     Carbon Dioxide   Date Value Ref Range Status   02/16/2023 32 (H) 23 - 31 mmol/L Final     Blood Urea Nitrogen   Date Value Ref Range Status   02/16/2023 12.3 9.8 - 20.1 mg/dL Final     Creatinine   Date Value Ref Range Status   02/16/2023 0.85 0.55 - 1.02 mg/dL Final     Calcium Level Total   Date Value Ref Range Status   02/16/2023 9.5 8.4 - 10.2 mg/dL Final     Albumin Level   Date Value Ref Range Status   02/16/2023 3.8 3.4 - 4.8 g/dL Final     Bilirubin Total   Date Value Ref Range Status   02/16/2023 0.7 <=1.5 mg/dL Final     Alkaline Phosphatase   Date Value Ref Range Status   02/16/2023 49 40 - 150 unit/L Final     Aspartate Aminotransferase  "  Date Value Ref Range Status   02/16/2023 18 5 - 34 unit/L Final     Alanine Aminotransferase   Date Value Ref Range Status   02/16/2023 15 0 - 55 unit/L Final     Estimated GFR-Non    Date Value Ref Range Status   05/20/2022 >60 mls/min/1.73/m2 Final         Assessment:       1) pT2pN0M0--Stage I colon cancer--diagnosed in April 2021   NCCN guidelines (COL-3) for pathological stage T2, N0, M0   Surveillance (COL-8): colonoscopy in 1 year after surgery and then in 1 year for advanced adenoma, if no advanced adenoma then repeat in 3 yrs and then q 5 yrs.  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  These are benign lesions.  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 She has not have any recent scan. She asked if she truly need them due to her age. Discussed NCCN guidelines for surveillance. She is a "young" 82, active with a very good PS and she is agree to do scans.   Agree with Colonoscopy due in 2 yrs--3/2024    RTC in 3 month with NP, labs prior: CBC, CMP, CEA  CT C/A/P every 6 months x 5 years--due 8/2023, will order next visit  Keep appt with GI, Dr Louis  Encouraged to call for any questions or problems  The patient was given ample opportunity to ask questions and they were all answered to satisfaction; patient demonstrated understanding of what we discussed and is agreeable to the plan.    VINOD QUINTANILLA MD       Professional Services   I, Marian Bruno LPN, acted solely as a scribe for and in the presence of Dr. Vinod Quintanilla, who performed these services.           "

## 2023-02-28 ENCOUNTER — TELEPHONE (OUTPATIENT)
Dept: HEMATOLOGY/ONCOLOGY | Facility: CLINIC | Age: 83
End: 2023-02-28
Payer: MEDICARE

## 2023-03-07 DIAGNOSIS — C50.919 MALIGNANT NEOPLASM OF FEMALE BREAST, UNSPECIFIED ESTROGEN RECEPTOR STATUS, UNSPECIFIED LATERALITY, UNSPECIFIED SITE OF BREAST: Primary | ICD-10-CM

## 2023-03-07 RX ORDER — METFORMIN HYDROCHLORIDE 1000 MG/1
TABLET ORAL
Qty: 45 TABLET | Refills: 3 | Status: SHIPPED | OUTPATIENT
Start: 2023-03-07 | End: 2023-07-28 | Stop reason: SDUPTHER

## 2023-05-22 RX ORDER — CALCIUM CITRATE/VITAMIN D3 200MG-6.25
TABLET ORAL
Qty: 100 STRIP | Refills: 11 | Status: SHIPPED | OUTPATIENT
Start: 2023-05-22 | End: 2023-12-13 | Stop reason: SDUPTHER

## 2023-05-25 ENCOUNTER — LAB VISIT (OUTPATIENT)
Dept: LAB | Facility: HOSPITAL | Age: 83
End: 2023-05-25
Attending: FAMILY MEDICINE
Payer: MEDICARE

## 2023-05-25 DIAGNOSIS — C18.2 MALIGNANT NEOPLASM OF ASCENDING COLON: ICD-10-CM

## 2023-05-25 DIAGNOSIS — R59.0 LOCALIZED ENLARGED LYMPH NODES: ICD-10-CM

## 2023-05-25 LAB
ALBUMIN SERPL-MCNC: 4.2 G/DL (ref 3.4–4.8)
ALBUMIN/GLOB SERPL: 1.4 RATIO (ref 1.1–2)
ALP SERPL-CCNC: 53 UNIT/L (ref 40–150)
ALT SERPL-CCNC: 24 UNIT/L (ref 0–55)
AST SERPL-CCNC: 26 UNIT/L (ref 5–34)
BASOPHILS # BLD AUTO: 0.02 X10(3)/MCL
BASOPHILS NFR BLD AUTO: 0.5 %
BILIRUBIN DIRECT+TOT PNL SERPL-MCNC: 0.8 MG/DL
BUN SERPL-MCNC: 18.5 MG/DL (ref 9.8–20.1)
CALCIUM SERPL-MCNC: 9.9 MG/DL (ref 8.4–10.2)
CEA SERPL-MCNC: 2.24 NG/ML (ref 0–3)
CHLORIDE SERPL-SCNC: 103 MMOL/L (ref 98–107)
CO2 SERPL-SCNC: 31 MMOL/L (ref 23–31)
CREAT SERPL-MCNC: 1.01 MG/DL (ref 0.55–1.02)
EOSINOPHIL # BLD AUTO: 0.14 X10(3)/MCL (ref 0–0.9)
EOSINOPHIL NFR BLD AUTO: 3.2 %
ERYTHROCYTE [DISTWIDTH] IN BLOOD BY AUTOMATED COUNT: 13.2 % (ref 11.5–17)
GFR SERPLBLD CREATININE-BSD FMLA CKD-EPI: 56 MLS/MIN/1.73/M2
GLOBULIN SER-MCNC: 2.9 GM/DL (ref 2.4–3.5)
GLUCOSE SERPL-MCNC: 199 MG/DL (ref 82–115)
HCT VFR BLD AUTO: 43.4 % (ref 37–47)
HGB BLD-MCNC: 13.7 G/DL (ref 12–16)
IMM GRANULOCYTES # BLD AUTO: 0 X10(3)/MCL (ref 0–0.04)
IMM GRANULOCYTES NFR BLD AUTO: 0 %
LYMPHOCYTES # BLD AUTO: 1.49 X10(3)/MCL (ref 0.6–4.6)
LYMPHOCYTES NFR BLD AUTO: 34.3 %
MCH RBC QN AUTO: 28.2 PG (ref 27–31)
MCHC RBC AUTO-ENTMCNC: 31.6 G/DL (ref 33–36)
MCV RBC AUTO: 89.3 FL (ref 80–94)
MONOCYTES # BLD AUTO: 0.29 X10(3)/MCL (ref 0.1–1.3)
MONOCYTES NFR BLD AUTO: 6.7 %
NEUTROPHILS # BLD AUTO: 2.41 X10(3)/MCL (ref 2.1–9.2)
NEUTROPHILS NFR BLD AUTO: 55.3 %
PLATELET # BLD AUTO: 131 X10(3)/MCL (ref 130–400)
PMV BLD AUTO: 10.9 FL (ref 7.4–10.4)
POTASSIUM SERPL-SCNC: 5.2 MMOL/L (ref 3.5–5.1)
PROT SERPL-MCNC: 7.1 GM/DL (ref 5.8–7.6)
RBC # BLD AUTO: 4.86 X10(6)/MCL (ref 4.2–5.4)
SODIUM SERPL-SCNC: 141 MMOL/L (ref 136–145)
WBC # SPEC AUTO: 4.35 X10(3)/MCL (ref 4.5–11.5)

## 2023-05-25 PROCEDURE — 36415 COLL VENOUS BLD VENIPUNCTURE: CPT

## 2023-05-25 PROCEDURE — 85025 COMPLETE CBC W/AUTO DIFF WBC: CPT

## 2023-05-25 PROCEDURE — 82378 CARCINOEMBRYONIC ANTIGEN: CPT

## 2023-05-25 PROCEDURE — 80053 COMPREHEN METABOLIC PANEL: CPT

## 2023-05-29 ENCOUNTER — OFFICE VISIT (OUTPATIENT)
Dept: HEMATOLOGY/ONCOLOGY | Facility: CLINIC | Age: 83
End: 2023-05-29
Payer: MEDICARE

## 2023-05-29 VITALS
HEIGHT: 64 IN | OXYGEN SATURATION: 98 % | DIASTOLIC BLOOD PRESSURE: 77 MMHG | BODY MASS INDEX: 30.16 KG/M2 | TEMPERATURE: 98 F | SYSTOLIC BLOOD PRESSURE: 126 MMHG | WEIGHT: 176.63 LBS | HEART RATE: 67 BPM

## 2023-05-29 DIAGNOSIS — C18.2 MALIGNANT NEOPLASM OF ASCENDING COLON: Primary | ICD-10-CM

## 2023-05-29 PROCEDURE — 99214 OFFICE O/P EST MOD 30 MIN: CPT | Mod: PBBFAC | Performed by: NURSE PRACTITIONER

## 2023-05-29 PROCEDURE — 99999 PR PBB SHADOW E&M-EST. PATIENT-LVL IV: CPT | Mod: PBBFAC,,, | Performed by: NURSE PRACTITIONER

## 2023-05-29 PROCEDURE — 99213 PR OFFICE/OUTPT VISIT, EST, LEVL III, 20-29 MIN: ICD-10-PCS | Mod: S$PBB,,, | Performed by: NURSE PRACTITIONER

## 2023-05-29 PROCEDURE — 99213 OFFICE O/P EST LOW 20 MIN: CPT | Mod: S$PBB,,, | Performed by: NURSE PRACTITIONER

## 2023-05-29 PROCEDURE — 99999 PR PBB SHADOW E&M-EST. PATIENT-LVL IV: ICD-10-PCS | Mod: PBBFAC,,, | Performed by: NURSE PRACTITIONER

## 2023-05-29 NOTE — PROGRESS NOTES
HEMATOLOGY/ONCOLOGY OFFICE CLINIC VISIT    Visit Information:    Initial Evaluation: 5/26/2021  Referring Provider: Dr. Robert Conner  Other providers:  Code status:  Not addressed    Diagnosis:  T2N0M0-Stage I Colon cancer. Dx on 4/22/21    Present treatment:    Surveillance    Treatment/Oncology history:  3/15/2021:Colonoscopy: malignant partially obstructing tumor in the ascending colon. Biopsy tubular adenoma with at least high-grade dysplasia.   4/22/2021: Laparoscopic/robotic right hemicolectomy     Plan of care: Surveillance      Imaging:  CT A/P 3/17/2021 at [facility]: irregular colonic mass 5 to 6 cm in length proximal to midportion of descending colon.  Enhancing soft tissue component contacts and may invade the right lateral abdominal wall.  No regional or distant lymphadenopathy identified.  Cirrhotic liver with portal hypertension, small volume ascites and splenomegaly.  No focal lesion identified in the liver.    CT of the thorax 4/5/2021: no thoracic metastatic disease identified.  Small volume ascites and borderline splenomegaly.No adenopathy or distant metastases.  CT angiogram 5/20/2021: negative for pulmonary embolism but lungs demonstrate mild heterogeneous attenuation with subtle peripheral reticular opacities.  Primary differential consideration is small airways disease.  Findings may be secondary to multifocal infiltrate from infection versus pulmonary edema.  It also showed moderate ascites with increase in size since prior examination.  Liver with nodular contour and was enlarged.  Spleen borderline enlarged.  A 1.3 x 1 cm enhancing nodule in the left adrenal gland no significant change.  Ultrasound of the abdomen 5/23/2021:cirrhosis of the liver with hepatosplenomegaly and ascites.   Ultrasound of the abdomen 10/26/2022: The pancreas appears grossly unremarkable.  No pancreatic mass or lesion is seen. liver is slightly enlarged in size. Liver is echogenic it measures 16 cm by my  measurements..  No liver mass or lesion is seen. spleen appears enlarged and measures 14.5 cm.  Hepatomegaly with findings consistent with hepatic steatosis  CT C/A/P 2/16/2023:     1. No evidence of metastatic disease within the chest, abdomen and pelvis  2. Cirrhotic morphology of the liver  3. Changes of portal hypertension including borderline splenomegaly and portosystemic collateral  4. Mild interstitial scarring within the lungs.       Pathology:  4/22/2022:   RIGHT COLON, HEMICOLECTOMY: ADENOCARCINOMA, WELL DIFFERENTIATED, 90% MUCINOUS.   --  Greatest tumor dimension, 7.0 cm (as measured grossly).  --  Adenocarcinoma arises from tubular adenoma with high grade dysplasia.  --  Lymphovascular invasion, not identified.  --  Adenocarcinoma superficially invades muscularis propria.  --  Eighteen mesenteric lymph nodes, no neoplasm (0/18).  --  Surgical margins, uninvolved.  POSTOPERATIVE SPINDLE CELL PSEUDOTUMOR, 1.5 CM.  -  The pseudotumor extends into mesenteric adipose tissue.   9/24/2021:  LIVER, CORE NEEDLE BIOPSY: FOCAL PORTAL TRIADITIS WITH SINUS DILATION.   - NO INTERFACE ACTIVITY.   - BRIDING FIBROSIS (FIBROSIS STAGE  F2-3)  - NO STEATOSIS.  Comment: Despite hepatitis testing demonstrating reactivity for hepatitis A in July 2021, no evidence of acute hepatitis is identified and LFTs are currently within normal limits. This case has also been reviewed in intradepartmental consultation.      CLINICAL HISTORY:       Patient: 82 year old female with multiple medical problems kindly referred for colon cancer.  She initially presented with anemia and fecal occult blood positive.  She also reports 20 pounds weight loss over the last previous 6 months. Colonoscopy on 3/15/2021 revealed a malignant partially obstructing tumor in the ascending colon. Biopsy showed tubular adenoma with at least high-grade dysplasia.  On 3/17/2021 she underwent a CT scan of the abdomen and pelvis at [facility] that showed an  irregular colonic mass measuring about 5 to 6 cm in length involving proximal to midportion of descending colon.  Enhancing soft tissue component contacts and may invade the right lateral abdominal wall.  No regional or distant lymphadenopathy identified.  Cirrhotic liver with portal hypertension, small volume ascites and splenomegaly.  No focal lesion identified in the liver.  Staging CT of the thorax 4/5/2021 with no thoracic metastatic disease identified.  Small volume ascites and borderline splenomegaly.No adenopathy or distant metastases.     Patient was seen by Dr. Robert Conner and she underwent laparoscopic/robotic right hemicolectomy on 4/22/2021.  Pathology report as follows:   RIGHT COLON, HEMICOLECTOMY: ADENOCARCINOMA, WELL DIFFERENTIATED, 90% MUCINOUS. 0/18 LN positive for malignanacy.See above for details.    Patient recovered from the surgery but came back to the emergency department for chest pain. She underwent CT angiogram that was negative for pulmonary embolism but lungs demonstrate mild heterogeneous attenuation with subtle peripheral reticular opacities.  Primary differential consideration is small airways disease.  Findings may be secondary to multifocal infiltrate from infection versus pulmonary edema.  It also showed moderate ascites with increase in size since prior examination.  Liver with nodular contour and was enlarged.  Spleen borderline enlarged.  1.3 x 1 cm enhancing nodule in the left adrenal gland no significant change.    She underwent ultrasound of the abdomen that confirmed the diagnosis of cirrhosis of the liver with hepatosplenomegaly and ascites.  She was discharged on 5/20/2021.    She stopped drinking at age 38, she says that she is a social drinker. She denies any family history of colon cancer. She denies any fever, chills, or sweats. No chest pain or shortness of breath.    She had liver biopsy ordered by Dr. Louis on 9/24/21. It was ordered for cirrhosis. She has no  "history of hepatitis infection or alcohol abuse. Biopsy was negative for malignancy. Just  fibrosis.              Chief Complaint: OTHER (No concerns today.)        Interval History:  Patient presents today for follow up in surveillance for colon cancer , accompanied by her daughter.  Patient has no complaints. She will have repeat colonoscopy next year in 2024.   Denies pain, bleeding, fever, chills, sweats.  No chest pain or shortness of breath.  She is still active.    ROS: All 14 points ROS taken and as per Interval History  Review of Systems   Constitutional:  Negative for chills, fever and weight loss.   HENT:  Negative for congestion and nosebleeds.    Eyes:  Negative for blurred vision, double vision and photophobia.   Respiratory:  Negative for cough, hemoptysis and shortness of breath.    Cardiovascular:  Negative for chest pain, palpitations, leg swelling and PND.   Gastrointestinal:  Negative for abdominal pain, blood in stool, constipation, diarrhea, melena, nausea and vomiting.   Genitourinary:  Negative for dysuria, frequency, hematuria and urgency.   Musculoskeletal:  Negative for back pain, falls and myalgias.   Skin:  Negative for itching and rash.   Neurological:  Negative for tremors, focal weakness, seizures, weakness and headaches.   Endo/Heme/Allergies:  Negative for environmental allergies. Does not bruise/bleed easily.   Psychiatric/Behavioral:  Negative for depression and suicidal ideas. The patient is not nervous/anxious.        Histories:  PMH/PSH/FH/SOCIAL/ALLERGIES AND MEDS REVIEWED AND UPDATED AS APPROPRIATE       Vitals:    05/29/23 1033   BP: 126/77   BP Location: Left arm   Patient Position: Sitting   Pulse: 67   Temp: 97.9 °F (36.6 °C)   TempSrc: Oral   SpO2: 98%   Weight: 80.1 kg (176 lb 9.6 oz)   Height: 5' 4" (1.626 m)        Physical Exam  Constitutional:       Appearance: Normal appearance.   HENT:      Head: Normocephalic and atraumatic.      Nose: Nose normal.      " Mouth/Throat:      Mouth: Mucous membranes are moist.      Pharynx: Oropharynx is clear.   Eyes:      Comments: Right eye subconjunctival hemorrhage   Cardiovascular:      Rate and Rhythm: Normal rate and regular rhythm.   Pulmonary:      Breath sounds: Normal breath sounds.   Abdominal:      Palpations: Abdomen is soft.   Musculoskeletal:         General: Normal range of motion.      Cervical back: Normal range of motion and neck supple.   Skin:     General: Skin is warm and dry.      Capillary Refill: Capillary refill takes less than 2 seconds.   Neurological:      General: No focal deficit present.      Mental Status: She is alert.   Psychiatric:         Mood and Affect: Mood normal.     ECOG SCORE    1 - Restricted in strenuous activity-ambulatory and able to carry out work of a light nature         Laboratory:  CBC with Differential:  Lab Results   Component Value Date    WBC 4.35 (L) 05/25/2023    RBC 4.86 05/25/2023    HGB 13.7 05/25/2023    HCT 43.4 05/25/2023    MCV 89.3 05/25/2023    MCH 28.2 05/25/2023    MCHC 31.6 (L) 05/25/2023    RDW 13.2 05/25/2023     05/25/2023    MPV 10.9 (H) 05/25/2023        CMP:  Sodium Level   Date Value Ref Range Status   05/25/2023 141 136 - 145 mmol/L Final     Potassium Level   Date Value Ref Range Status   05/25/2023 5.2 (H) 3.5 - 5.1 mmol/L Final     Carbon Dioxide   Date Value Ref Range Status   05/25/2023 31 23 - 31 mmol/L Final     Blood Urea Nitrogen   Date Value Ref Range Status   05/25/2023 18.5 9.8 - 20.1 mg/dL Final     Creatinine   Date Value Ref Range Status   05/25/2023 1.01 0.55 - 1.02 mg/dL Final     Calcium Level Total   Date Value Ref Range Status   05/25/2023 9.9 8.4 - 10.2 mg/dL Final     Albumin Level   Date Value Ref Range Status   05/25/2023 4.2 3.4 - 4.8 g/dL Final     Bilirubin Total   Date Value Ref Range Status   05/25/2023 0.8 <=1.5 mg/dL Final     Alkaline Phosphatase   Date Value Ref Range Status   05/25/2023 53 40 - 150 unit/L Final      Aspartate Aminotransferase   Date Value Ref Range Status   05/25/2023 26 5 - 34 unit/L Final     Alanine Aminotransferase   Date Value Ref Range Status   05/25/2023 24 0 - 55 unit/L Final     Estimated GFR-Non    Date Value Ref Range Status   05/20/2022 >60 mls/min/1.73/m2 Final         Assessment:       1) pT2pN0M0--Stage I colon cancer--diagnosed in April 2021   NCCN guidelines (COL-3) for pathological stage T2, N0, M0   Surveillance (COL-8): colonoscopy in 1 year after surgery and then in 1 year for advanced adenoma, if no advanced adenoma then repeat in 3 yrs and then q 5 yrs.  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  These are benign lesions.  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   Colonoscopy due in 3/2024  CT C/A/P every 6 months x 5 years--due 8/2023, ordered  RTC in 3 month with MD, labs prior: CBC, CMP, CEA    Encouraged to call for any questions or problems  The patient was given ample opportunity to ask questions and they were all answered to satisfaction; patient demonstrated understanding of what we discussed and is agreeable to the plan.        ARUN Arreguin

## 2023-06-05 RX ORDER — PRAVASTATIN SODIUM 20 MG/1
TABLET ORAL
Qty: 30 TABLET | Refills: 11 | Status: SHIPPED | OUTPATIENT
Start: 2023-06-05 | End: 2023-12-07 | Stop reason: SDUPTHER

## 2023-06-06 RX ORDER — GLUCOSAM/CHON-MSM1/C/MANG/BOSW 500-416.6
TABLET ORAL
Qty: 100 EACH | Refills: 11 | Status: SHIPPED | OUTPATIENT
Start: 2023-06-06

## 2023-06-06 RX ORDER — PEN NEEDLE, DIABETIC 30 GX3/16"
100 NEEDLE, DISPOSABLE MISCELLANEOUS DAILY
Qty: 100 EACH | Refills: 11 | Status: SHIPPED | OUTPATIENT
Start: 2023-06-06 | End: 2024-01-02

## 2023-06-06 NOTE — TELEPHONE ENCOUNTER
"----- Message -----   From: Sveta Pierson   Sent: 6/6/2023   3:18 PM CDT   To: [facility] Staff   Subject: med refill                                       .Type:  RX Refill Request     Who Called: Pt   Refill or New Rx:Refill   RX Name and Strength:pen needle, diabetic (BD ULTRA-FINE SHORT PEN NEEDLE) 31 gauge x 5/16" Ndle   How is the patient currently taking it? (ex. 1XDay):   Is this a 30 day or 90 day RX:   Preferred Pharmacy with phone number:[id number] - [place], [place] - [address] AT [facility] [address]   Local or Mail Order:Local   Ordering Provider:Jazmín   Would the patient rather a call back or a response via [facility]? Call back   Best Call Back Number:[phone]   Additional Information:   "

## 2023-07-19 ENCOUNTER — TELEPHONE (OUTPATIENT)
Dept: FAMILY MEDICINE | Facility: CLINIC | Age: 83
End: 2023-07-19
Payer: MEDICARE

## 2023-07-19 DIAGNOSIS — E03.9 HYPOTHYROIDISM, UNSPECIFIED TYPE: ICD-10-CM

## 2023-07-19 DIAGNOSIS — E55.9 VITAMIN D DEFICIENCY: ICD-10-CM

## 2023-07-19 DIAGNOSIS — E11.9 TYPE 2 DIABETES MELLITUS WITHOUT COMPLICATION, WITH LONG-TERM CURRENT USE OF INSULIN: ICD-10-CM

## 2023-07-19 DIAGNOSIS — Z79.4 TYPE 2 DIABETES MELLITUS WITHOUT COMPLICATION, WITH LONG-TERM CURRENT USE OF INSULIN: ICD-10-CM

## 2023-07-19 DIAGNOSIS — E78.5 DYSLIPIDEMIA: ICD-10-CM

## 2023-07-19 DIAGNOSIS — Z00.00 MEDICARE ANNUAL WELLNESS VISIT, SUBSEQUENT: Primary | ICD-10-CM

## 2023-07-19 NOTE — TELEPHONE ENCOUNTER
----- Message from Sveta Pierson sent at 7/19/2023  9:56 AM CDT -----  Regarding: lab orders  .Type:  Needs Medical Advice    Who Called: Pt's daugter  Symptoms (please be specific):    How long has patient had these symptoms:    Pharmacy name and phone #:    Would the patient rather a call back or a response via [facility]? Call back  Best Call Back Number: [phone]  Additional Information: Pt needing lab orders put in, appt is 7/28, please f/u when orders are in

## 2023-07-20 ENCOUNTER — TELEPHONE (OUTPATIENT)
Dept: FAMILY MEDICINE | Facility: CLINIC | Age: 83
End: 2023-07-20
Payer: MEDICARE

## 2023-07-20 NOTE — TELEPHONE ENCOUNTER
----- [facility] sent at 7/20/2023  1:12 PM CDT -----  .Caller is requesting to schedule their Lab appointment prior to annual appointment.  Order is not listed in EPIC.  Please enter order and contact patient to schedule.    Name of Caller: pt      Preferred Date and Time of Labs: now    Date of [facility] Appointment:  7-28-23      Where would they like the lab performed? [facility]    Would the patient rather a call back? Yes      Best Call Back Number: [phone]     Additional Information: please send lab orders over to [place]

## 2023-07-25 ENCOUNTER — DOCUMENTATION ONLY (OUTPATIENT)
Dept: FAMILY MEDICINE | Facility: CLINIC | Age: 83
End: 2023-07-25
Payer: MEDICARE

## 2023-07-25 LAB
CHOLEST SERPL-MSCNC: 149 MG/DL (ref 0–200)
CHOLEST SERPL-MSCNC: 149 MG/DL (ref 0–200)
HBA1C MFR BLD: 7.4 % (ref 4–6)
HDLC SERPL-MCNC: 49 MG/DL (ref 35–70)
HDLC SERPL-MCNC: 49 MG/DL (ref 35–70)
LDLC SERPL CALC-MCNC: 73 MG/DL (ref 0–160)
LDLC SERPL CALC-MCNC: 73 MG/DL (ref 0–160)
TRIGL SERPL-MCNC: 135 MG/DL (ref 40–160)
TRIGL SERPL-MCNC: 135 MG/DL (ref 40–160)

## 2023-07-26 NOTE — PROGRESS NOTES
Please inform patient of results.  Keep scheduled appt 7/28/23. Can discuss more at visit    1. Only lab from [facility] received was a1c which was up from previous and was 7.4.  had she done any other labs?  Is she taking her dmii meds as prescribed and following dmii diet?

## 2023-07-27 ENCOUNTER — DOCUMENTATION ONLY (OUTPATIENT)
Dept: FAMILY MEDICINE | Facility: CLINIC | Age: 83
End: 2023-07-27
Payer: MEDICARE

## 2023-07-28 ENCOUNTER — OFFICE VISIT (OUTPATIENT)
Dept: FAMILY MEDICINE | Facility: CLINIC | Age: 83
End: 2023-07-28
Payer: MEDICARE

## 2023-07-28 VITALS
DIASTOLIC BLOOD PRESSURE: 78 MMHG | SYSTOLIC BLOOD PRESSURE: 130 MMHG | BODY MASS INDEX: 30.39 KG/M2 | OXYGEN SATURATION: 99 % | RESPIRATION RATE: 16 BRPM | HEART RATE: 73 BPM | HEIGHT: 64 IN | WEIGHT: 178 LBS | TEMPERATURE: 98 F

## 2023-07-28 DIAGNOSIS — E55.9 VITAMIN D DEFICIENCY: ICD-10-CM

## 2023-07-28 DIAGNOSIS — I10 PRIMARY HYPERTENSION: ICD-10-CM

## 2023-07-28 DIAGNOSIS — E03.9 HYPOTHYROIDISM, UNSPECIFIED TYPE: ICD-10-CM

## 2023-07-28 DIAGNOSIS — F41.9 ANXIETY: ICD-10-CM

## 2023-07-28 DIAGNOSIS — Z78.0 POSTMENOPAUSAL: ICD-10-CM

## 2023-07-28 DIAGNOSIS — E11.9 TYPE 2 DIABETES MELLITUS WITHOUT COMPLICATION, WITH LONG-TERM CURRENT USE OF INSULIN: ICD-10-CM

## 2023-07-28 DIAGNOSIS — Z71.89 ADVANCED CARE PLANNING/COUNSELING DISCUSSION: ICD-10-CM

## 2023-07-28 DIAGNOSIS — Z12.31 BREAST CANCER SCREENING BY MAMMOGRAM: ICD-10-CM

## 2023-07-28 DIAGNOSIS — M85.80 OSTEOPENIA, UNSPECIFIED LOCATION: ICD-10-CM

## 2023-07-28 DIAGNOSIS — E78.5 DYSLIPIDEMIA: ICD-10-CM

## 2023-07-28 DIAGNOSIS — C18.2 MALIGNANT NEOPLASM OF ASCENDING COLON: ICD-10-CM

## 2023-07-28 DIAGNOSIS — Z00.00 MEDICARE ANNUAL WELLNESS VISIT, SUBSEQUENT: Primary | ICD-10-CM

## 2023-07-28 DIAGNOSIS — E66.09 CLASS 1 OBESITY DUE TO EXCESS CALORIES WITH SERIOUS COMORBIDITY AND BODY MASS INDEX (BMI) OF 30.0 TO 30.9 IN ADULT: ICD-10-CM

## 2023-07-28 DIAGNOSIS — Z79.4 TYPE 2 DIABETES MELLITUS WITHOUT COMPLICATION, WITH LONG-TERM CURRENT USE OF INSULIN: ICD-10-CM

## 2023-07-28 DIAGNOSIS — I85.00 ESOPHAGEAL VARICES WITHOUT BLEEDING, UNSPECIFIED ESOPHAGEAL VARICES TYPE: ICD-10-CM

## 2023-07-28 PROBLEM — E66.811 CLASS 1 OBESITY DUE TO EXCESS CALORIES WITH SERIOUS COMORBIDITY AND BODY MASS INDEX (BMI) OF 30.0 TO 30.9 IN ADULT: Status: ACTIVE | Noted: 2022-07-27

## 2023-07-28 LAB
APPEARANCE UR: ABNORMAL
BACTERIA #/AREA URNS AUTO: ABNORMAL /HPF
BILIRUB UR QL STRIP.AUTO: NEGATIVE
COLOR UR: YELLOW
GLUCOSE UR QL STRIP.AUTO: NEGATIVE
KETONES UR QL STRIP.AUTO: NEGATIVE
LEUKOCYTE ESTERASE UR QL STRIP.AUTO: ABNORMAL
NITRITE UR QL STRIP.AUTO: NEGATIVE
PH UR STRIP.AUTO: 5.5 [PH]
PROT UR QL STRIP.AUTO: NEGATIVE
RBC #/AREA URNS AUTO: <5 /HPF
RBC UR QL AUTO: NEGATIVE
SP GR UR STRIP.AUTO: 1.01 (ref 1–1.03)
SQUAMOUS #/AREA URNS AUTO: 11 /HPF
UROBILINOGEN UR STRIP-ACNC: 0.2
WBC #/AREA URNS AUTO: <5 /HPF

## 2023-07-28 PROCEDURE — G0439 PR MEDICARE ANNUAL WELLNESS SUBSEQUENT VISIT: ICD-10-PCS | Mod: ,,, | Performed by: FAMILY MEDICINE

## 2023-07-28 PROCEDURE — G0439 PPPS, SUBSEQ VISIT: HCPCS | Mod: ,,, | Performed by: FAMILY MEDICINE

## 2023-07-28 RX ORDER — METFORMIN HYDROCHLORIDE 1000 MG/1
1000 TABLET ORAL 2 TIMES DAILY WITH MEALS
Qty: 180 TABLET | Refills: 3 | Status: SHIPPED | OUTPATIENT
Start: 2023-07-28 | End: 2023-11-13 | Stop reason: SDUPTHER

## 2023-07-28 NOTE — PROGRESS NOTES
Patient ID: [id number]     Chief Complaint: Medicare AWV (Wellness )      HPI:     Wendie Ferreira is a 83 y.o. female here today for a Medicare Wellness. No other complaints today.       presents to clinic with her grand daughter for wellness visit.   She did labs prior to appt and was reviewed with them at time of appt.      labs 1/2021 showed anemia so fobt and ua were ordered. her fobt was positive 1/27/21 so she was referred to GI. She had a colonoscopy with dr. louis on 3/15/21 which showed hemorrhoids but also a mass in the ascending colon. biopsy showed tubular adenoma. she had ct chest which was negative for disease progression 4/5/21. she had laparoscopic/robotic right hemicolectomy with dr. lia duran on 4/22/21. she was discharged home on 4/25/21 oral iron. she went to the Er on 5/20- 5/24/21 for SOB and anemia.  was also given bactrim for erythema of her incision. she also received 1 UPRBC while inpatient as well as IV iron. she was discharged and told to stop norvasc, benazepril. she has also been off lasix. she is followed by oncology, dr. whittington. has follow up scheduled in 8/2022 with labs. repeat colonoscopy with dr. louis is due 3/29/2022. She states she completed however we do not have documentation. Will request from his office.   EGD with dr. louis 7/26/21- showed 3 varices of lower esophagus and gastritis. Repeat EGD in 2 years and US guided liver biopsy 9/2021.  She is eating and drinking well. Denies problems with urine or bowel.  No blood in stool.    has EGD scheduled with dr. Louis 8/2023.  She follows with dr. Whittington. As well.  Has appt for labs and ct 8/2023     Patient has diabetes. Currently she is on metformin 500mg bid and Victoza 1.2mg. She has been using and tolerated well. Her eye exams are done by Dr. Andersen 11/2022. Her last foot exam was today. a1c 7.4 7/2023. checking cbgs average 130s. urine micro today     She also has CAD, hypertension and hyperlipidemia. Her  "cardiologist is Dr. Saab. saw him recently.  All was good. No changes. She is not on a baby aspirin.  Not on plavix due to varices.      She also has low vitamin D and supplements otc. Level 7/2023 wnl     Has hypothyroidism. on Synthroid. tsh wnl 7/2023.     She also has anxiety and is on Lexapro. Happy with dosing.      Her mammogram in 8/2022 was normal. does every other year. Denies family history of breast cancer. Her bone density test in 8/2022 showed osteopenia. Repeat due 8/2024     She is ALLERGIC to codeine. She does not drink alcohol or smoke. She reports she had shingles (8/2015) and pneumonia shots at [facility]. utd on 2021 flu shot      Past Surgical History:   Procedure Laterality Date    CARPAL TUNNEL RELEASE      CATARACT EXTRACTION Right 09/2014    CHOLECYSTECTOMY      COLECTOMY Right 04/22/2021    Ascending    CORONARY STENT PLACEMENT      EYE SURGERY  2014    Right eye Cataracts    SMALL INTESTINE SURGERY  April 2021    Colon cancer 12 colon resection    TUBAL LIGATION  1980????       Review of patient's allergies indicates:   Allergen Reactions    Adhesive      Other reaction(s): Skin tear  adhesive tape    Codeine      "Feels like I'm having a heart attack"    Milk      Other reaction(s): GAS       No outpatient medications have been marked as taking for the 7/28/23 encounter (Office Visit) with Nicole Cooper MD.       Social History     Socioeconomic History    Marital status:    Tobacco Use    Smoking status: Former     Types: Cigarettes    Smokeless tobacco: Never   Substance and Sexual Activity    Alcohol use: Not Currently    Drug use: Never    Sexual activity: Not Currently        Family History   Problem Relation Age of Onset    Hypertension Mother     Heart failure Mother     Diabetes Father     Hypertension Sister         Patient Care Team:  Nicole Cooper MD as PCP - General (Family Medicine)  MD Roman Lopez MD as Consulting Physician " (Cardiology)  Adriana Kapadia MD as Consulting Physician (Hematology and Oncology)  Rich Louis MD as Consulting Physician (Gastroenterology)       Subjective:     Review of Systems   Constitutional: Negative.    HENT: Negative.     Eyes: Negative.    Respiratory: Negative.     Cardiovascular: Negative.    Gastrointestinal: Negative.    Genitourinary: Negative.    Musculoskeletal: Negative.    Skin: Negative.    Neurological: Negative.    Endo/Heme/Allergies: Negative.    Psychiatric/Behavioral: Negative.         Patient Reported Health Risk Assessment  What is your age?: 80 or older  Are you male or female?: Female  During the past four weeks, how much have you been bothered by emotional problems such as feeling anxious, depressed, irritable, sad, or downhearted and blue?: Not at all  During the past five weeks, has your physical and/or emotional health limited your social activities with family, friends, neighbors, or groups?: Not at all  During the past four weeks, how much bodily pain have you generally had?: Mild pain  During the past four weeks, was someone available to help if you needed and wanted help?: Yes, as much as I wanted  During the past four weeks, what was the hardest physical activity you could do for at least two minutes?: Light  Can you get to places out of walking distance without help?  (For example, can you travel alone on buses or taxis, or drive your own car?): Yes  Can you go shopping for groceries or clothes without someone's help?: Yes  Can you prepare your own meals?: Yes  Can you do your own housework without help?: Yes  Because of any health problems, do you need the help of another person with your personal care needs such as eating, bathing, dressing, or getting around the house?: No  Can you handle your own money without help?: Yes  During the past four weeks, how would you rate your health in general?: Good  How have things been going for you during the past four weeks?:  "Good and bad parts about equal  Are you having difficulties driving your car?: No  Do you always fasten your seat belt when you are in a car?: Yes, usually  How often in the past four weeks have you been bothered by falling or dizzy when standing up?: Sometimes  How often in the past four weeks have you been bothered by sexual problems?: Never  How often in the past four weeks have you been bothered by trouble eating well?: Never  How often in the past four weeks have you been bothered by teeth or denture problems?: Never  How often in the past four weeks have you been bothered with problems using the telephone?: Never  How often in the past four weeks have you been bothered by tiredness or fatigue?: Never  Have you fallen two or more times in the past year?: No  Are you afraid of falling?: No  Are you a smoker?: No  During the past four weeks, how many drinks of wine, beer, or other alcoholic beverages did you have?: No alcohol at all  Do you exercise for about 20 minutes three or more days a week?: Yes, some of the time  Have you been given any information to help you with hazards in your house that might hurt you?: No  Have you been given any information to help you with keeping track of your medications?: No  How often do you have trouble taking medicines the way you've been told to take them?: I always take them as prescribed  How confident are you that you can control and manage most of your health problems?: Very confident  What is your race? (Check all that apply.):     Objective:     /78 (BP Location: Left arm)   Pulse 73   Temp 97.7 °F (36.5 °C) (Temporal)   Resp 16   Ht 5' 4" (1.626 m)   Wt 80.7 kg (178 lb)   SpO2 99%   BMI 30.55 kg/m²     Physical Exam  Vitals and nursing note reviewed.   Constitutional:       Appearance: Normal appearance. She is obese.   HENT:      Head: Normocephalic and atraumatic.      Nose: Nose normal.      Mouth/Throat:      Mouth: Mucous membranes are " moist.      Pharynx: Oropharynx is clear.   Eyes:      Extraocular Movements: Extraocular movements intact.   Cardiovascular:      Rate and Rhythm: Normal rate and regular rhythm.      Pulses: Normal pulses.           Dorsalis pedis pulses are 2+ on the right side and 2+ on the left side.        Posterior tibial pulses are 2+ on the right side and 2+ on the left side.      Heart sounds: Normal heart sounds.   Pulmonary:      Effort: Pulmonary effort is normal.      Breath sounds: Normal breath sounds.   Musculoskeletal:         General: Normal range of motion.      Cervical back: Normal range of motion.        Feet:    Feet:      Right foot:      Protective Sensation: 8 sites tested.  8 sites sensed.      Skin integrity: Skin integrity normal.      Left foot:      Protective Sensation: 8 sites tested.  8 sites sensed.      Skin integrity: Skin integrity normal.   Skin:     General: Skin is warm and dry.   Neurological:      General: No focal deficit present.      Mental Status: She is alert and oriented to person, place, and time. Mental status is at baseline.   Psychiatric:         Mood and Affect: Mood normal.         No flowsheet data found.  Fall Risk Assessment - Outpatient 7/28/2023 5/29/2023 2/27/2023 1/6/2023 11/17/2022 7/27/2022   Mobility Status Ambulatory Ambulatory Ambulatory Ambulatory Ambulatory Ambulatory   Number of falls 0 0 0 0 0 1 with injury   Identified as fall risk 0 0 0 0 0 1           Depression Screening  Over the past two weeks, has the patient felt down, depressed, or hopeless?: No  Over the past two weeks, has the patient felt little interest or pleasure in doing things?: No  Functional Ability/Safety Screening  Was the patient's timed Up & Go test unsteady or longer than 30 seconds?: No  Does the patient need help with phone, transportation, shopping, preparing meals, housework, laundry, meds, or managing money?: No  Does the patient's home have rugs in the hallway, lack grab bars in the  bathroom, lack handrails on the stairs or have poor lighting?: No  Have you noticed any hearing difficulties?: No  Cognitive Function (Assessed through direct observation with due consideration of information obtained by way of patient reports and/or concerns raised by family, friends, caretakers, or others)    Does the patient repeat questions/statements in the same day?: No  Does the patient have trouble remembering the date, year, and time?: No  Does the patient have difficulty managing finances?: No  Does the patient have a decreased sense of direction?: No  Assessment/Plan:       Medicare Annual Wellness and Personalized Prevention Plan:   Fall Risk + Home Safety + Hearing Impairment + Depression Screen + Cognitive Impairment Screen + Health Risk Assessment all reviewed.     Opioid Screening: Patient medication list reviewed, patient is not taking prescription opioids. Patient is not using additional opioids than prescribed. Patient is at low risk of substance abuse based on this opioid use history.         Health Maintenance Topics with due status: Not Due       Topic Last Completion Date    TETANUS VACCINE 09/09/2017    Influenza Vaccine 11/03/2021    DEXA Scan 08/05/2022    Eye Exam 11/14/2022    Lipid Panel 07/25/2023    Hemoglobin A1c 07/25/2023      The patient's Health Maintenance was reviewed and the following appears to be due at this time:   Health Maintenance Due   Topic Date Due    Diabetes Urine Screening  03/25/2020    COVID-19 Vaccine (3 - Pfizer series) 10/14/2021         1. Medicare annual wellness visit, subsequent  Assessment & Plan:  Previously done labs reviewed with patient at time of appt today  dexa 8/2022.  Showed osteopenia. Repeat due 8/2024  mmg 8/2022. Does every other year  Colonoscopy 3/2021 with dr. morales however patient reports repeat done 2022. So we will request record    Advanced care planning discussed and paperwork given    Orders:  -     Microalbumin/Creatinine Ratio,  Urine; Future; Expected date: 07/28/2023  -     CBC Auto Differential; Future; Expected date: 07/28/2024  -     Comprehensive Metabolic Panel; Future; Expected date: 07/28/2024  -     Lipid Panel; Future; Expected date: 07/28/2024  -     TSH; Future; Expected date: 07/28/2024  -     Hemoglobin A1C; Future; Expected date: 07/28/2024  -     Urinalysis; Future; Expected date: 07/28/2024  -     Microalbumin/Creatinine Ratio, Urine; Future; Expected date: 07/28/2024  -     Vitamin D; Future; Expected date: 07/28/2024    2. Advanced care planning/counseling discussion  Assessment & Plan:  Advanced care planning discussed and paperwork given.    I attest that I have had a face to face discussion with patient and or surrogate decision maker.   Included surrogate decision maker: NO  Advanced directive in chart: NO  LAPOST: NO    Total time spent: 16 minutes      Orders:  -     Microalbumin/Creatinine Ratio, Urine; Future; Expected date: 07/28/2023  -     CBC Auto Differential; Future; Expected date: 07/28/2024  -     Comprehensive Metabolic Panel; Future; Expected date: 07/28/2024  -     Lipid Panel; Future; Expected date: 07/28/2024  -     TSH; Future; Expected date: 07/28/2024  -     Hemoglobin A1C; Future; Expected date: 07/28/2024  -     Urinalysis; Future; Expected date: 07/28/2024  -     Microalbumin/Creatinine Ratio, Urine; Future; Expected date: 07/28/2024  -     Vitamin D; Future; Expected date: 07/28/2024    3. Type 2 diabetes mellitus without complication, with long-term current use of insulin  Assessment & Plan:  a1c 7.4 7/2023 [facility]  Urine micro today  Foot exam today  Eye exam  with dr. magana    On victoza and metformin. On statin. Recent a1c worse than previous. Will increase to metformin 1000mg bid. New rx sent in. Monitor cbgs. Follow dmii diet. Will follow up in 6 months with labs or sooner if needed.     Orders:  -     Microalbumin/Creatinine Ratio, Urine; Future; Expected date:  07/28/2023  -     CBC Auto Differential; Future; Expected date: 07/28/2024  -     Comprehensive Metabolic Panel; Future; Expected date: 07/28/2024  -     Lipid Panel; Future; Expected date: 07/28/2024  -     TSH; Future; Expected date: 07/28/2024  -     Hemoglobin A1C; Future; Expected date: 07/28/2024  -     Urinalysis; Future; Expected date: 07/28/2024  -     Microalbumin/Creatinine Ratio, Urine; Future; Expected date: 07/28/2024  -     Vitamin D; Future; Expected date: 07/28/2024  -     metFORMIN (GLUCOPHAGE) 1000 MG tablet; Take 1 tablet (1,000 mg total) by mouth 2 (two) times daily with meals.  Dispense: 180 tablet; Refill: 3  -     Comprehensive Metabolic Panel; Future; Expected date: 01/28/2024  -     Hemoglobin A1C; Future; Expected date: 01/28/2024    4. Hypothyroidism, unspecified type  Assessment & Plan:  Lab Results   Component Value Date    TSH 3.900 (H) 03/25/2019   tsh 7/2023 1.150 at [facility] on synthroid    Orders:  -     Microalbumin/Creatinine Ratio, Urine; Future; Expected date: 07/28/2023  -     CBC Auto Differential; Future; Expected date: 07/28/2024  -     Comprehensive Metabolic Panel; Future; Expected date: 07/28/2024  -     Lipid Panel; Future; Expected date: 07/28/2024  -     TSH; Future; Expected date: 07/28/2024  -     Hemoglobin A1C; Future; Expected date: 07/28/2024  -     Urinalysis; Future; Expected date: 07/28/2024  -     Microalbumin/Creatinine Ratio, Urine; Future; Expected date: 07/28/2024  -     Vitamin D; Future; Expected date: 07/28/2024    5. Breast cancer screening by mammogram  Assessment & Plan:  mmg 8/2022. Declines mmg this year. Will plan on 8/2024 with dexa.     Orders:  -     Microalbumin/Creatinine Ratio, Urine; Future; Expected date: 07/28/2023  -     CBC Auto Differential; Future; Expected date: 07/28/2024  -     Comprehensive Metabolic Panel; Future; Expected date: 07/28/2024  -     Lipid Panel; Future; Expected date: 07/28/2024  -     TSH;  Future; Expected date: 07/28/2024  -     Hemoglobin A1C; Future; Expected date: 07/28/2024  -     Urinalysis; Future; Expected date: 07/28/2024  -     Microalbumin/Creatinine Ratio, Urine; Future; Expected date: 07/28/2024  -     Vitamin D; Future; Expected date: 07/28/2024    6. Postmenopausal  Assessment & Plan:  dexa 8/2022 showed osteopenia.  On ca and vit d. Encourage weight bearing exercises. Repeat dexa due 8/2024    Orders:  -     Microalbumin/Creatinine Ratio, Urine; Future; Expected date: 07/28/2023  -     CBC Auto Differential; Future; Expected date: 07/28/2024  -     Comprehensive Metabolic Panel; Future; Expected date: 07/28/2024  -     Lipid Panel; Future; Expected date: 07/28/2024  -     TSH; Future; Expected date: 07/28/2024  -     Hemoglobin A1C; Future; Expected date: 07/28/2024  -     Urinalysis; Future; Expected date: 07/28/2024  -     Microalbumin/Creatinine Ratio, Urine; Future; Expected date: 07/28/2024  -     Vitamin D; Future; Expected date: 07/28/2024    7. Osteopenia, unspecified location  Assessment & Plan:  See postmenopausal A&P    Orders:  -     Microalbumin/Creatinine Ratio, Urine; Future; Expected date: 07/28/2023  -     CBC Auto Differential; Future; Expected date: 07/28/2024  -     Comprehensive Metabolic Panel; Future; Expected date: 07/28/2024  -     Lipid Panel; Future; Expected date: 07/28/2024  -     TSH; Future; Expected date: 07/28/2024  -     Hemoglobin A1C; Future; Expected date: 07/28/2024  -     Urinalysis; Future; Expected date: 07/28/2024  -     Microalbumin/Creatinine Ratio, Urine; Future; Expected date: 07/28/2024  -     Vitamin D; Future; Expected date: 07/28/2024    8. Vitamin D deficiency  Assessment & Plan:  Continue to supplement otc. Level 7/2023 wnl    Orders:  -     Microalbumin/Creatinine Ratio, Urine; Future; Expected date: 07/28/2023  -     CBC Auto Differential; Future; Expected date: 07/28/2024  -     Comprehensive Metabolic Panel; Future; Expected date:  07/28/2024  -     Lipid Panel; Future; Expected date: 07/28/2024  -     TSH; Future; Expected date: 07/28/2024  -     Hemoglobin A1C; Future; Expected date: 07/28/2024  -     Urinalysis; Future; Expected date: 07/28/2024  -     Microalbumin/Creatinine Ratio, Urine; Future; Expected date: 07/28/2024  -     Vitamin D; Future; Expected date: 07/28/2024    9. Primary hypertension  Assessment & Plan:  Stable on current meds. On asa. Keep appointments with cards    Orders:  -     Microalbumin/Creatinine Ratio, Urine; Future; Expected date: 07/28/2023  -     CBC Auto Differential; Future; Expected date: 07/28/2024  -     Comprehensive Metabolic Panel; Future; Expected date: 07/28/2024  -     Lipid Panel; Future; Expected date: 07/28/2024  -     TSH; Future; Expected date: 07/28/2024  -     Hemoglobin A1C; Future; Expected date: 07/28/2024  -     Urinalysis; Future; Expected date: 07/28/2024  -     Microalbumin/Creatinine Ratio, Urine; Future; Expected date: 07/28/2024  -     Vitamin D; Future; Expected date: 07/28/2024    10. Dyslipidemia  Assessment & Plan:  On statin. Keep appointments with dr. ovalle    Orders:  -     Microalbumin/Creatinine Ratio, Urine; Future; Expected date: 07/28/2023  -     CBC Auto Differential; Future; Expected date: 07/28/2024  -     Comprehensive Metabolic Panel; Future; Expected date: 07/28/2024  -     Lipid Panel; Future; Expected date: 07/28/2024  -     TSH; Future; Expected date: 07/28/2024  -     Hemoglobin A1C; Future; Expected date: 07/28/2024  -     Urinalysis; Future; Expected date: 07/28/2024  -     Microalbumin/Creatinine Ratio, Urine; Future; Expected date: 07/28/2024  -     Vitamin D; Future; Expected date: 07/28/2024    11. Anxiety  Assessment & Plan:  Stable on lexapro    Orders:  -     Microalbumin/Creatinine Ratio, Urine; Future; Expected date: 07/28/2023  -     CBC Auto Differential; Future; Expected date: 07/28/2024  -     Comprehensive Metabolic Panel; Future; Expected date:  07/28/2024  -     Lipid Panel; Future; Expected date: 07/28/2024  -     TSH; Future; Expected date: 07/28/2024  -     Hemoglobin A1C; Future; Expected date: 07/28/2024  -     Urinalysis; Future; Expected date: 07/28/2024  -     Microalbumin/Creatinine Ratio, Urine; Future; Expected date: 07/28/2024  -     Vitamin D; Future; Expected date: 07/28/2024    12. Malignant neoplasm of ascending colon  Assessment & Plan:  Dx 3/15/21 with colonoscopy with dr. PORSHA morales.  Had right hemicolectomy with dr. Robert duran 4/22/21.    Keep appointments with GI and oncology      Orders:  -     Microalbumin/Creatinine Ratio, Urine; Future; Expected date: 07/28/2023  -     CBC Auto Differential; Future; Expected date: 07/28/2024  -     Comprehensive Metabolic Panel; Future; Expected date: 07/28/2024  -     Lipid Panel; Future; Expected date: 07/28/2024  -     TSH; Future; Expected date: 07/28/2024  -     Hemoglobin A1C; Future; Expected date: 07/28/2024  -     Urinalysis; Future; Expected date: 07/28/2024  -     Microalbumin/Creatinine Ratio, Urine; Future; Expected date: 07/28/2024  -     Vitamin D; Future; Expected date: 07/28/2024    13. Class 1 obesity due to excess calories with serious comorbidity and body mass index (BMI) of 30.0 to 30.9 in adult  Assessment & Plan:  Encourage lifestyle change    Orders:  -     Microalbumin/Creatinine Ratio, Urine; Future; Expected date: 07/28/2023  -     CBC Auto Differential; Future; Expected date: 07/28/2024  -     Comprehensive Metabolic Panel; Future; Expected date: 07/28/2024  -     Lipid Panel; Future; Expected date: 07/28/2024  -     TSH; Future; Expected date: 07/28/2024  -     Hemoglobin A1C; Future; Expected date: 07/28/2024  -     Urinalysis; Future; Expected date: 07/28/2024  -     Microalbumin/Creatinine Ratio, Urine; Future; Expected date: 07/28/2024  -     Vitamin D; Future; Expected date: 07/28/2024    14. Esophageal varices without bleeding, unspecified esophageal varices  type  Assessment & Plan:  egd 7/2021 with dr. Louis. Recommended repeat in 2 years    Orders:  -     Microalbumin/Creatinine Ratio, Urine; Future; Expected date: 07/28/2023  -     CBC Auto Differential; Future; Expected date: 07/28/2024  -     Comprehensive Metabolic Panel; Future; Expected date: 07/28/2024  -     Lipid Panel; Future; Expected date: 07/28/2024  -     TSH; Future; Expected date: 07/28/2024  -     Hemoglobin A1C; Future; Expected date: 07/28/2024  -     Urinalysis; Future; Expected date: 07/28/2024  -     Microalbumin/Creatinine Ratio, Urine; Future; Expected date: 07/28/2024  -     Vitamin D; Future; Expected date: 07/28/2024         Advance Care Planning   I attest to discussing Advance Care Planning with patient and/or family member.  Education was provided including the importance of the Health Care Power of , Advance Directives, and/or [place] documentation.  The patient expressed understanding to the importance of this information and discussion.  Length of ACP conversation in minutes: 16         Medication List with Changes/Refills   Current Medications    CHOLECALCIFEROL, VITAMIN D3, (VITAMIN D3) 50 MCG (2,000 UNIT) TAB    Take 2,000 Int'l Units by mouth.       Start Date: --        End Date: --    ESCITALOPRAM OXALATE (LEXAPRO) 10 MG TABLET    Take 1 tablet (10 mg total) by mouth once daily.       Start Date: 11/21/2022End Date: --    FAMOTIDINE (PEPCID) 40 MG TABLET    Take 40 mg by mouth every evening.       Start Date: 10/26/2022End Date: --    LANCETS (TRUEPLUS LANCETS) 28 GAUGE MISC    USE TO TEST ONCE DAILY       Start Date: 6/6/2023  End Date: --    LEVOTHYROXINE (SYNTHROID) 75 MCG TABLET    Take 1 tablet (75 mcg total) by mouth once daily.       Start Date: 11/21/2022End Date: --    LIRAGLUTIDE 0.6 MG/0.1 ML, 18 MG/3 ML, SUBQ PNIJ (VICTOZA 2-MARTA) 0.6 MG/0.1 ML (18 MG/3 ML) PNIJ PEN    Inject 1.2 mg into the skin once daily.       Start Date: --        End Date: --     "NITROGLYCERIN (NITROSTAT) 0.4 MG SL TABLET    Place 0.4 mg under the tongue.       Start Date: 10/28/2021End Date: --    PEN NEEDLE, DIABETIC (BD ULTRA-FINE SHORT PEN NEEDLE) 31 GAUGE X 5/16" NDLE    100 each by Misc.(Non-Drug; Combo Route) route once daily.       Start Date: 6/6/2023  End Date: --    PRAVASTATIN (PRAVACHOL) 20 MG TABLET    TAKE 1 TABLET(20 MG) BY MOUTH EVERY EVENING       Start Date: 6/5/2023  End Date: --    SPIRONOLACTONE (ALDACTONE) 25 MG TABLET    Take 0.5 tablets (12.5 mg total) by mouth once daily.       Start Date: 9/6/2022  End Date: --    TRUE METRIX GLUCOSE TEST STRIP STRP    USE TO TEST ONCE DAILY       Start Date: 5/22/2023 End Date: --    UNABLE TO FIND    1 lancet once daily.       Start Date: 6/3/2021  End Date: --    UNABLE TO FIND      BD pen needle shrt [id number] (5/16) ROMA, See Instructions, use daily to inject insulin. dx. e11.9, # 100 EA, 11 Refill(s), Pharmacy: [id number], 164, cm, Height/Length Dosing, 06/02/21 21:58:00 CDT, 73.2, kg, Weight Dosing, 06/02/21 21:...       Start Date: 6/9/2021  End Date: --   Changed and/or Refilled Medications    Modified Medication Previous Medication    METFORMIN (GLUCOPHAGE) 1000 MG TABLET metFORMIN (GLUCOPHAGE) 1000 MG tablet       Take 1 tablet (1,000 mg total) by mouth 2 (two) times daily with meals.    TAKE 1/2 TABLET BY MOUTH DAILY       Start Date: 7/28/2023 End Date: 7/27/2024    Start Date: 3/7/2023  End Date: 7/28/2023   Discontinued Medications    LIRAGLUTIDE 0.6 MG/0.1 ML, 18 MG/3 ML, SUBQ PNIJ (VICTOZA 3-MARTA) 0.6 MG/0.1 ML (18 MG/3 ML) PNIJ PEN    ADMINISTER 1.2 MG UNDER THE SKIN EVERY DAY       Start Date: 2/17/2023 End Date: 7/28/2023        Follow up in about 6 months (around 1/28/2024) for diabetes with labs. In addition to their scheduled follow up, the patient has also been instructed to follow up on as needed basis.     "

## 2023-07-28 NOTE — ASSESSMENT & PLAN NOTE
dexa 8/2022 showed osteopenia.  On ca and vit d. Encourage weight bearing exercises. Repeat dexa due 8/2024

## 2023-07-28 NOTE — ASSESSMENT & PLAN NOTE
a1c 7.4 7/2023 at [facility]  Urine micro today  Foot exam today  Eye exam  with dr. magana    On victoza and metformin. On statin. Recent a1c worse than previous. Will increase to metformin 1000mg bid. New rx sent in. Monitor cbgs. Follow dmii diet. Will follow up in 6 months with labs or sooner if needed.

## 2023-07-28 NOTE — PROGRESS NOTES
Please inform patient of results.    1. I ordered a urine micro today. Not ua. Please have lab result correct test    Her urine is cloudy and does show some bacteria. Does not look like culture was reflexed.  She did not mention any uti symptoms at her visit. Encourage fluids. Monitor.

## 2023-07-28 NOTE — ASSESSMENT & PLAN NOTE
Previously done labs reviewed with patient at time of appt today  dexa 8/2022.  Showed osteopenia. Repeat due 8/2024  mmg 8/2022. Does every other year  Colonoscopy 3/2021 with dr. morales however patient reports repeat done 2022. So we will request record    Advanced care planning discussed and paperwork given

## 2023-07-28 NOTE — ASSESSMENT & PLAN NOTE
Dx 3/15/21 with colonoscopy with dr. PORSHA morales.  Had right hemicolectomy with dr. Robert duran 4/22/21.    Keep appointments with GI and oncology

## 2023-07-28 NOTE — ASSESSMENT & PLAN NOTE
Lab Results   Component Value Date    TSH 3.900 (H) 03/25/2019   tsh 7/2023 1.150 at [facility]   Stable on synthroid

## 2023-08-07 ENCOUNTER — HOSPITAL ENCOUNTER (OUTPATIENT)
Facility: HOSPITAL | Age: 83
Discharge: HOME | End: 2023-08-07
Attending: INTERNAL MEDICINE | Admitting: INTERNAL MEDICINE
Payer: MEDICARE

## 2023-08-07 ENCOUNTER — ANESTHESIA EVENT (OUTPATIENT)
Dept: ENDOSCOPY | Facility: HOSPITAL | Age: 83
End: 2023-08-07
Payer: MEDICARE

## 2023-08-07 ENCOUNTER — ANESTHESIA (OUTPATIENT)
Dept: ENDOSCOPY | Facility: HOSPITAL | Age: 83
End: 2023-08-07
Payer: MEDICARE

## 2023-08-07 DIAGNOSIS — K74.60 HEPATIC CIRRHOSIS, UNSPECIFIED HEPATIC CIRRHOSIS TYPE, UNSPECIFIED WHETHER ASCITES PRESENT: ICD-10-CM

## 2023-08-07 DIAGNOSIS — I85.00 ESOPHAGEAL VARICES WITHOUT BLEEDING, UNSPECIFIED ESOPHAGEAL VARICES TYPE: ICD-10-CM

## 2023-08-07 LAB — POCT GLUCOSE: 164 MG/DL (ref 70–110)

## 2023-08-07 PROCEDURE — 43239 EGD BIOPSY SINGLE/MULTIPLE: CPT | Performed by: INTERNAL MEDICINE

## 2023-08-07 PROCEDURE — 88305 TISSUE EXAM BY PATHOLOGIST: CPT | Performed by: INTERNAL MEDICINE

## 2023-08-07 PROCEDURE — 37000008 HC ANESTHESIA 1ST 15 MINUTES: Performed by: INTERNAL MEDICINE

## 2023-08-07 PROCEDURE — D9220A PRA ANESTHESIA: ICD-10-PCS | Mod: ANES,,, | Performed by: ANESTHESIOLOGY

## 2023-08-07 PROCEDURE — 25000003 PHARM REV CODE 250: Performed by: NURSE ANESTHETIST, CERTIFIED REGISTERED

## 2023-08-07 PROCEDURE — D9220A PRA ANESTHESIA: Mod: CRNA,,, | Performed by: NURSE ANESTHETIST, CERTIFIED REGISTERED

## 2023-08-07 PROCEDURE — D9220A PRA ANESTHESIA: Mod: ANES,,, | Performed by: ANESTHESIOLOGY

## 2023-08-07 PROCEDURE — 27201423 OPTIME MED/SURG SUP & DEVICES STERILE SUPPLY: Performed by: INTERNAL MEDICINE

## 2023-08-07 PROCEDURE — 37000009 HC ANESTHESIA EA ADD 15 MINS: Performed by: INTERNAL MEDICINE

## 2023-08-07 PROCEDURE — D9220A PRA ANESTHESIA: ICD-10-PCS | Mod: CRNA,,, | Performed by: NURSE ANESTHETIST, CERTIFIED REGISTERED

## 2023-08-07 RX ORDER — MEPERIDINE HYDROCHLORIDE 25 MG/ML
12.5 INJECTION INTRAMUSCULAR; INTRAVENOUS; SUBCUTANEOUS EVERY 10 MIN PRN
Status: DISCONTINUED | OUTPATIENT
Start: 2023-08-07 | End: 2023-08-07 | Stop reason: HOSPADM

## 2023-08-07 RX ORDER — ONDANSETRON 2 MG/ML
4 INJECTION INTRAMUSCULAR; INTRAVENOUS DAILY PRN
Status: DISCONTINUED | OUTPATIENT
Start: 2023-08-07 | End: 2023-08-07 | Stop reason: HOSPADM

## 2023-08-07 RX ORDER — IPRATROPIUM BROMIDE AND ALBUTEROL SULFATE 2.5; .5 MG/3ML; MG/3ML
3 SOLUTION RESPIRATORY (INHALATION)
Status: DISCONTINUED | OUTPATIENT
Start: 2023-08-07 | End: 2023-08-07 | Stop reason: HOSPADM

## 2023-08-07 RX ORDER — LIDOCAINE HYDROCHLORIDE 20 MG/ML
INJECTION INTRAVENOUS
Status: DISCONTINUED | OUTPATIENT
Start: 2023-08-07 | End: 2023-08-07

## 2023-08-07 RX ORDER — PROPOFOL 10 MG/ML
INJECTION, EMULSION INTRAVENOUS CONTINUOUS PRN
Status: DISCONTINUED | OUTPATIENT
Start: 2023-08-07 | End: 2023-08-07

## 2023-08-07 RX ORDER — LIDOCAINE HYDROCHLORIDE 10 MG/ML
1 INJECTION, SOLUTION EPIDURAL; INFILTRATION; INTRACAUDAL; PERINEURAL ONCE
Status: CANCELLED | OUTPATIENT
Start: 2023-08-07 | End: 2023-08-07

## 2023-08-07 RX ORDER — PROCHLORPERAZINE EDISYLATE 5 MG/ML
5 INJECTION INTRAMUSCULAR; INTRAVENOUS EVERY 30 MIN PRN
Status: DISCONTINUED | OUTPATIENT
Start: 2023-08-07 | End: 2023-08-07 | Stop reason: HOSPADM

## 2023-08-07 RX ORDER — LORAZEPAM 2 MG/ML
0.25 INJECTION INTRAMUSCULAR ONCE AS NEEDED
Status: DISCONTINUED | OUTPATIENT
Start: 2023-08-07 | End: 2023-08-07 | Stop reason: HOSPADM

## 2023-08-07 RX ORDER — ONDANSETRON 4 MG/1
8 TABLET, ORALLY DISINTEGRATING ORAL EVERY 6 HOURS PRN
Status: CANCELLED | OUTPATIENT
Start: 2023-08-07

## 2023-08-07 RX ORDER — GLYCOPYRROLATE 0.2 MG/ML
INJECTION INTRAMUSCULAR; INTRAVENOUS
Status: DISCONTINUED | OUTPATIENT
Start: 2023-08-07 | End: 2023-08-07

## 2023-08-07 RX ORDER — SODIUM CHLORIDE, SODIUM GLUCONATE, SODIUM ACETATE, POTASSIUM CHLORIDE AND MAGNESIUM CHLORIDE 30; 37; 368; 526; 502 MG/100ML; MG/100ML; MG/100ML; MG/100ML; MG/100ML
INJECTION, SOLUTION INTRAVENOUS CONTINUOUS
Status: CANCELLED | OUTPATIENT
Start: 2023-08-07 | End: 2023-09-06

## 2023-08-07 RX ADMIN — PROPOFOL 100 MCG/KG/MIN: 10 INJECTION, EMULSION INTRAVENOUS at 11:08

## 2023-08-07 RX ADMIN — SODIUM CHLORIDE, SODIUM GLUCONATE, SODIUM ACETATE, POTASSIUM CHLORIDE AND MAGNESIUM CHLORIDE: 526; 502; 368; 37; 30 INJECTION, SOLUTION INTRAVENOUS at 11:08

## 2023-08-07 RX ADMIN — LIDOCAINE HYDROCHLORIDE 80 MG: 20 INJECTION INTRAVENOUS at 11:08

## 2023-08-07 RX ADMIN — GLYCOPYRROLATE 0.2 MG: 0.2 INJECTION INTRAMUSCULAR; INTRAVENOUS at 11:08

## 2023-08-07 NOTE — ANESTHESIA POSTPROCEDURE EVALUATION
Anesthesia Post Evaluation    Patient: Wendie Ferreira    Procedure(s) Performed: Procedure(s) (LRB):  EGD (N/A)  EGD, WITH CLOSED BIOPSY    Final Anesthesia Type: general      Patient location during evaluation: GI PACU  Patient participation: Yes- Able to Participate  Level of consciousness: awake and alert  Post-procedure vital signs: reviewed and stable  Pain management: adequate  Airway patency: patent    PONV status at discharge: No PONV  Anesthetic complications: no      Cardiovascular status: blood pressure returned to baseline  Respiratory status: spontaneous ventilation and room air  Hydration status: euvolemic  Follow-up not needed.          Vitals Value Taken Time   /81 08/07/23 1227   Temp 37 08/07/23 1332   Pulse 91 08/07/23 1227   Resp 19 08/07/23 1227   SpO2 96 % 08/07/23 1227         No case tracking events are documented in the log.      Pain/Mike Score: Mike Score: 9 (8/7/2023 12:07 PM)

## 2023-08-07 NOTE — ANESTHESIA PREPROCEDURE EVALUATION
08/07/2023  Wendie Ferreira is a 83 y.o., female  With CAD status post stent (2017) presents as an outpatient for EGD (history of cirrhosis and varices).     left heart catheterization 2017   Left main normal   Lad 50% mid with 60% proximal 1st diagonal  Left circumflex 80% InStent restenosis distal treated with ERIC  % with left-to-right collateral   EF 50%    Last 3 sets of Vitals    Vitals - 1 value per visit 7/28/2023 8/4/2023 8/7/2023   SYSTOLIC 130 - 146   DIASTOLIC 78 - 79   Pulse 73 - 71   Temp 97.7 - 98.6   Resp 16 - 11   SPO2 99 - 97   Weight (lb) 178 175 175   Weight (kg) 80.74 79.379 79.379   Height 64 64 64   BMI (Calculated) 30.5 30 30   VISIT REPORT [id number]  [id number]; - -   Pain Score  - - -         Lab Results   Component Value Date    WBC 4.35 (L) 05/25/2023    HGB 13.7 05/25/2023    HCT 43.4 05/25/2023    MCV 89.3 05/25/2023     05/25/2023          BMP  Lab Results   Component Value Date     05/25/2023    K 5.2 (H) 05/25/2023    CO2 31 05/25/2023    BUN 18.5 05/25/2023    CREATININE 1.01 05/25/2023    CALCIUM 9.9 05/25/2023    EGFRNONAA >60 05/20/2022      Pre-op Assessment    I have reviewed the Patient Summary Reports.    I have reviewed the NPO Status.   I have reviewed the Medications.     Review of Systems  Anesthesia Hx:   Denies Personal Hx of Anesthesia complications.   Social:  Non-Smoker    Cardiovascular:   Hypertension  Functional Capacity 2 METS  Coronary Artery Disease: S/P Percutaneous Coronary Intervention (PCI)   S/P Drug Eluting Stent (ERIC), drug eluding stent placed 2017. Hx of Myocardial Infarction    Hepatic/GI:   GERD  Liver Disease, Fatty Liver , Cirrhosis Esophageal Varices    Endocrine:   Diabetes, type 2 Hypothyroidism        Physical Exam  General: Well nourished, Cooperative, Alert and Oriented    Airway:  Mallampati: II    Mouth Opening: Normal  TM Distance: Normal  Tongue: Normal  Neck ROM: Normal ROM    Dental:  Intact, Dentures    Chest/Lungs:  Clear to auscultation, Normal Respiratory Rate    Heart:  Rate: Normal  Rhythm: Regular Rhythm        Anesthesia Plan  Type of Anesthesia, risks & benefits discussed:    Anesthesia Type: Gen Natural Airway  Intra-op Monitoring Plan: Standard ASA Monitors  Induction:  IV  Informed Consent: Informed consent signed with the Patient and all parties understand the risks and agree with anesthesia plan.  All questions answered.   ASA Score: 3  Day of Surgery Review of History & Physical: H&P Update referred to the surgeon/provider.    Ready For Surgery From Anesthesia Perspective.     .

## 2023-08-07 NOTE — TRANSFER OF CARE
"Anesthesia Transfer of Care Note    Patient: Wendie Ferreira    Procedure(s) Performed: Procedure(s) (LRB):  EGD (N/A)  EGD, WITH CLOSED BIOPSY    Patient location: GI    Anesthesia Type: MAC    Transport from OR: Transported from OR on room air with adequate spontaneous ventilation    Post assessment: no apparent anesthetic complications and tolerated procedure well    Post vital signs: stable    Level of consciousness: awake, alert, oriented and responds to stimulation    Nausea/Vomiting: no nausea/vomiting    Complications: none    Transfer of care protocol was followed      Last vitals:   Visit Vitals  BP (!) 146/79   Pulse 71   Temp 37 °C (98.6 °F)   Resp 11   Ht 5' 4" (1.626 m)   Wt 79.4 kg (175 lb)   SpO2 97%   Breastfeeding No   BMI 30.04 kg/m²     "

## 2023-08-07 NOTE — H&P
"Gastroenterology Note    CC: Varices    HPI 83 y.o. female presents for an EGD today due to esophageal varices.  She denies any bleeding, dysphagia, pain with swallowing.  Last EGD was in July 2021.    Past Medical History:   Diagnosis Date    Acid reflux     Colon cancer     Dyslipidemia     Esophageal varices     Heart attack          Review of Systems  General ROS: negative for - chills, fever or weight loss  Cardiovascular ROS: no chest pain or dyspnea on exertion  Gastrointestinal ROS: as per HPI    Physical Examination  BP (!) 146/79   Pulse 71   Temp 98.6 °F (37 °C)   Resp 11   Ht 5' 4" (1.626 m)   Wt 79.4 kg (175 lb)   SpO2 97%   Breastfeeding No   BMI 30.04 kg/m²   General appearance: alert, cooperative, no distress  HENT: Normocephalic, atraumatic, neck symmetrical, no nasal discharge   Lungs: clear to auscultation bilaterally, symmetric chest wall expansion bilaterally  Heart: regular rate and rhythm without rub; no displacement of the PMI   Abdomen: soft NT ND BS present;  Extremities: extremities symmetric; no clubbing, cyanosis, or edema  Neurologic: Alert and oriented X 3, normal strength, normal coordination and gait      Assessment:   - Esophageal varices      Plan:  - EGD today      "

## 2023-08-07 NOTE — ANESTHESIA RELEASE NOTE
"Anesthesia Release from PACU Note    Patient: Wendie Ferreira    Procedure(s) Performed: Procedure(s) (LRB):  EGD (N/A)  EGD, WITH CLOSED BIOPSY    Anesthesia type: MAC    Post pain: Adequate analgesia    Post assessment: no apparent anesthetic complications, tolerated procedure well and no evidence of recall    Last Vitals:   Visit Vitals  BP (!) 146/79   Pulse 71   Temp 37 °C (98.6 °F)   Resp 11   Ht 5' 4" (1.626 m)   Wt 79.4 kg (175 lb)   SpO2 97%   Breastfeeding No   BMI 30.04 kg/m²       Post vital signs: stable    Level of consciousness: awake, alert , oriented and responds to stimulation    Nausea/Vomiting: no nausea/no vomiting    Complications: none    Airway Patency: patent    Respiratory: unassisted, spontaneous ventilation, room air    Cardiovascular: stable and blood pressure at baseline    Hydration: euvolemic  "

## 2023-08-07 NOTE — PROVATION PATIENT INSTRUCTIONS
Discharge Summary/Instructions after an Endoscopic Procedure  Patient Name: Wendie Ferreira  Patient MRN: [id number]  Patient YOB: 1940 Monday, August 7, 2023  Rich Louis MD  Dear patient,  As a result of recent federal legislation (The Federal Cures Act), you may   receive lab or pathology results from your procedure in your [facility]   account before your physician is able to contact you. Your physician or   their representative will relay the results to you with their   recommendations at their soonest availability.  Thank you,  RESTRICTIONS:  During your procedure today, you received medications for sedation.  These   medications may affect your judgment, balance and coordination.  Therefore,   for 24 hours, you have the following restrictions:   - DO NOT drive a car, operate machinery, make legal/financial decisions,   sign important papers or drink alcohol.    ACTIVITY:  Today: no heavy lifting, straining or running due to procedural   sedation/anesthesia.  The following day: return to full activity including work.  DIET:  Eat and drink normally unless instructed otherwise.     TREATMENT FOR COMMON SIDE EFFECTS:  - Mild abdominal pain, nausea, belching, bloating or excessive gas:  rest,   eat lightly and use a heating pad.  - Sore Throat: treat with throat lozenges and/or gargle with warm salt   water.  - Because air was used during the procedure, expelling large amounts of air   from your rectum or belching is normal.  - If a bowel prep was taken, you may not have a bowel movement for 1-3 days.    This is normal.  SYMPTOMS TO WATCH FOR AND REPORT TO YOUR PHYSICIAN:  1. Abdominal pain or bloating, other than gas cramps.  2. Chest pain.  3. Back pain.  4. Signs of infection such as: chills or fever occurring within 24 hours   after the procedure.  5. Rectal bleeding, which would show as bright red, maroon, or black stools.   (A tablespoon of blood from the rectum is not serious, especially  if   hemorrhoids are present.)  6. Vomiting.  7. Weakness or dizziness.  GO DIRECTLY TO THE NEAREST EMERGENCY ROOM IF YOU HAVE ANY OF THE FOLLOWING:      Difficulty breathing              Chills and/or fever over 101 F   Persistent vomiting and/or vomiting blood   Severe abdominal pain   Severe chest pain   Black, tarry stools   Bleeding- more than one tablespoon   Any other symptom or condition that you feel may need urgent attention  Your doctor recommends these additional instructions:  If any biopsies were taken, your doctors clinic will contact you in 1 to 2   weeks with any results.  - Discharge patient to home.   - Resume previous diet.   - Continue present medications.   - Await pathology results.   - Repeat upper endoscopy in 2 years for surveillance.   - Return to GI office at appointment to be scheduled.   - Patient has a contact number available for emergencies.  The signs and   symptoms of potential delayed complications were discussed with the   patient.  Return to normal activities tomorrow.  Written discharge   instructions were provided to the patient.  For questions, problems or results please call your physician - Rich Louis MD at Work:  [phone].  [facility], EMERGENCY ROOM PHONE NUMBER: [phone]  IF A COMPLICATION OR EMERGENCY SITUATION ARISES AND YOU ARE UNABLE TO REACH   YOUR PHYSICIAN - GO DIRECTLY TO THE EMERGENCY ROOM.  Rich Louis MD  8/7/2023 12:12:35 PM  This report has been verified and signed electronically.  Dear patient,  As a result of recent federal legislation (The Federal Cures Act), you may   receive lab or pathology results from your procedure in your [facility]   account before your physician is able to contact you. Your physician or   their representative will relay the results to you with their   recommendations at their soonest availability.  Thank you,  PROVATION

## 2023-08-08 VITALS
HEART RATE: 91 BPM | RESPIRATION RATE: 19 BRPM | BODY MASS INDEX: 29.88 KG/M2 | TEMPERATURE: 98 F | DIASTOLIC BLOOD PRESSURE: 81 MMHG | HEIGHT: 64 IN | SYSTOLIC BLOOD PRESSURE: 146 MMHG | OXYGEN SATURATION: 96 % | WEIGHT: 175 LBS

## 2023-08-09 ENCOUNTER — PATIENT MESSAGE (OUTPATIENT)
Dept: ADMINISTRATIVE | Facility: OTHER | Age: 83
End: 2023-08-09
Payer: MEDICARE

## 2023-08-09 LAB — PSYCHE PATHOLOGY RESULT: NORMAL

## 2023-08-28 ENCOUNTER — HOSPITAL ENCOUNTER (OUTPATIENT)
Dept: RADIOLOGY | Facility: HOSPITAL | Age: 83
Discharge: HOME | End: 2023-08-28
Attending: NURSE PRACTITIONER
Payer: MEDICARE

## 2023-08-28 DIAGNOSIS — C18.2 MALIGNANT NEOPLASM OF ASCENDING COLON: ICD-10-CM

## 2023-08-28 LAB
CREAT SERPL-MCNC: 0.8 MG/DL (ref 0.5–1.4)
SAMPLE: NORMAL

## 2023-08-28 PROCEDURE — 71260 CT THORAX DX C+: CPT | Mod: TC

## 2023-08-28 PROCEDURE — 74177 CT ABD & PELVIS W/CONTRAST: CPT | Mod: TC

## 2023-08-28 PROCEDURE — 25500020 PHARM REV CODE 255: Performed by: NURSE PRACTITIONER

## 2023-08-28 RX ORDER — ESCITALOPRAM OXALATE 10 MG/1
10 TABLET ORAL DAILY
Qty: 90 TABLET | Refills: 3 | Status: SHIPPED | OUTPATIENT
Start: 2023-08-28

## 2023-08-28 RX ADMIN — IOPAMIDOL 100 ML: 755 INJECTION, SOLUTION INTRAVENOUS at 12:08

## 2023-08-28 RX ADMIN — DIATRIZOATE MEGLUMINE AND DIATRIZOATE SODIUM 30 ML: 660; 100 LIQUID ORAL; RECTAL at 11:08

## 2023-08-28 NOTE — TELEPHONE ENCOUNTER
----- Message from Sveta Pierson sent at 8/28/2023  9:39 AM CDT -----  Regarding: med refill  .Type:  RX Refill Request    Who Called: Pt  Refill or New Rx:Refill  RX Name and Strength:EScitalopram oxalate (LEXAPRO) 10 MG tablet  How is the patient currently taking it? (ex. 1XDay):1xday  Is this a 30 day or 90 day RX:90  Preferred Pharmacy with phone number:[id number] - [place], [place] - [address] AT [facility] [address]  Local or Mail Order:Local  Ordering Provider:Allison  Would the patient rather a call back or a response via [facility]? Call back  Best Call Back Number:[phone]  Additional Information: Pt has 5 pills left.

## 2023-08-30 ENCOUNTER — OFFICE VISIT (OUTPATIENT)
Dept: HEMATOLOGY/ONCOLOGY | Facility: CLINIC | Age: 83
End: 2023-08-30
Payer: MEDICARE

## 2023-08-30 VITALS
BODY MASS INDEX: 30.05 KG/M2 | DIASTOLIC BLOOD PRESSURE: 75 MMHG | HEIGHT: 64 IN | HEART RATE: 63 BPM | OXYGEN SATURATION: 98 % | WEIGHT: 176 LBS | RESPIRATION RATE: 18 BRPM | TEMPERATURE: 98 F | SYSTOLIC BLOOD PRESSURE: 125 MMHG

## 2023-08-30 DIAGNOSIS — C18.9 MALIGNANT NEOPLASM OF COLON, UNSPECIFIED PART OF COLON: Primary | ICD-10-CM

## 2023-08-30 PROCEDURE — 99999 PR PBB SHADOW E&M-EST. PATIENT-LVL V: CPT | Mod: PBBFAC,,, | Performed by: INTERNAL MEDICINE

## 2023-08-30 PROCEDURE — 99215 OFFICE O/P EST HI 40 MIN: CPT | Mod: PBBFAC | Performed by: INTERNAL MEDICINE

## 2023-08-30 PROCEDURE — 99214 OFFICE O/P EST MOD 30 MIN: CPT | Mod: S$PBB,,, | Performed by: INTERNAL MEDICINE

## 2023-08-30 PROCEDURE — 99999 PR PBB SHADOW E&M-EST. PATIENT-LVL V: ICD-10-PCS | Mod: PBBFAC,,, | Performed by: INTERNAL MEDICINE

## 2023-08-30 PROCEDURE — 99214 PR OFFICE/OUTPT VISIT, EST, LEVL IV, 30-39 MIN: ICD-10-PCS | Mod: S$PBB,,, | Performed by: INTERNAL MEDICINE

## 2023-08-30 NOTE — PROGRESS NOTES
HEMATOLOGY/ONCOLOGY OFFICE CLINIC VISIT    Visit Information:    Initial Evaluation: 5/26/2021  Referring Provider: Dr. Robert Conner  Other providers:  Code status:  Not addressed    Diagnosis:  T2N0M0-Stage I Colon cancer. Dx on 4/22/21    Present treatment:    Surveillance    Treatment/Oncology history:  3/15/2021:Colonoscopy: malignant partially obstructing tumor in the ascending colon. Biopsy tubular adenoma with at least high-grade dysplasia.   4/22/2021: Laparoscopic/robotic right hemicolectomy     Plan of care: Surveillance      Imaging:  CT A/P 3/17/2021 at [facility]: irregular colonic mass 5 to 6 cm in length proximal to midportion of descending colon.  Enhancing soft tissue component contacts and may invade the right lateral abdominal wall.  No regional or distant lymphadenopathy identified.  Cirrhotic liver with portal hypertension, small volume ascites and splenomegaly.  No focal lesion identified in the liver.    CT of the thorax 4/5/2021: no thoracic metastatic disease identified.  Small volume ascites and borderline splenomegaly.No adenopathy or distant metastases.  CT angiogram 5/20/2021: negative for pulmonary embolism but lungs demonstrate mild heterogeneous attenuation with subtle peripheral reticular opacities.  Primary differential consideration is small airways disease.  Findings may be secondary to multifocal infiltrate from infection versus pulmonary edema.  It also showed moderate ascites with increase in size since prior examination.  Liver with nodular contour and was enlarged.  Spleen borderline enlarged.  A 1.3 x 1 cm enhancing nodule in the left adrenal gland no significant change.  Ultrasound of the abdomen 5/23/2021:cirrhosis of the liver with hepatosplenomegaly and ascites.   Ultrasound of the abdomen 10/26/2022: The pancreas appears grossly unremarkable.  No pancreatic mass or lesion is seen. liver is slightly enlarged in size. Liver is echogenic it measures 16 cm by my  measurements..  No liver mass or lesion is seen. spleen appears enlarged and measures 14.5 cm.  Hepatomegaly with findings consistent with hepatic steatosis  CT C/A/P 2/16/2023:     1. No evidence of metastatic disease within the chest, abdomen and pelvis  2. Cirrhotic morphology of the liver  3. Changes of portal hypertension including borderline splenomegaly and portosystemic collateral  4. Mild interstitial scarring within the lungs.  CT C/A/P  8/28/2023:  No new suspicious findings chest, abdomen or pelvis.  Chronic findings above.    Pathology:  4/22/2022:   RIGHT COLON, HEMICOLECTOMY: ADENOCARCINOMA, WELL DIFFERENTIATED, 90% MUCINOUS.   --  Greatest tumor dimension, 7.0 cm (as measured grossly).  --  Adenocarcinoma arises from tubular adenoma with high grade dysplasia.  --  Lymphovascular invasion, not identified.  --  Adenocarcinoma superficially invades muscularis propria.  --  Eighteen mesenteric lymph nodes, no neoplasm (0/18).  --  Surgical margins, uninvolved.  POSTOPERATIVE SPINDLE CELL PSEUDOTUMOR, 1.5 CM.  -  The pseudotumor extends into mesenteric adipose tissue.   9/24/2021:  LIVER, CORE NEEDLE BIOPSY: FOCAL PORTAL TRIADITIS WITH SINUS DILATION.   - NO INTERFACE ACTIVITY.   - BRIDING FIBROSIS (FIBROSIS STAGE  F2-3)  - NO STEATOSIS.  Comment: Despite hepatitis testing demonstrating reactivity for hepatitis A in July 2021, no evidence of acute hepatitis is identified and LFTs are currently within normal limits. This case has also been reviewed in intradepartmental consultation.      CLINICAL HISTORY:       Patient: 82 year old female with multiple medical problems kindly referred for colon cancer.  She initially presented with anemia and fecal occult blood positive.  She also reports 20 pounds weight loss over the last previous 6 months. Colonoscopy on 3/15/2021 revealed a malignant partially obstructing tumor in the ascending colon. Biopsy showed tubular adenoma with at least high-grade dysplasia.   On 3/17/2021 she underwent a CT scan of the abdomen and pelvis at [facility] that showed an irregular colonic mass measuring about 5 to 6 cm in length involving proximal to midportion of descending colon.  Enhancing soft tissue component contacts and may invade the right lateral abdominal wall.  No regional or distant lymphadenopathy identified.  Cirrhotic liver with portal hypertension, small volume ascites and splenomegaly.  No focal lesion identified in the liver.  Staging CT of the thorax 4/5/2021 with no thoracic metastatic disease identified.  Small volume ascites and borderline splenomegaly.No adenopathy or distant metastases.     Patient was seen by Dr. Robert Conner and she underwent laparoscopic/robotic right hemicolectomy on 4/22/2021.  Pathology report as follows:   RIGHT COLON, HEMICOLECTOMY: ADENOCARCINOMA, WELL DIFFERENTIATED, 90% MUCINOUS. 0/18 LN positive for malignanacy.See above for details.    Patient recovered from the surgery but came back to the emergency department for chest pain. She underwent CT angiogram that was negative for pulmonary embolism but lungs demonstrate mild heterogeneous attenuation with subtle peripheral reticular opacities.  Primary differential consideration is small airways disease.  Findings may be secondary to multifocal infiltrate from infection versus pulmonary edema.  It also showed moderate ascites with increase in size since prior examination.  Liver with nodular contour and was enlarged.  Spleen borderline enlarged.  1.3 x 1 cm enhancing nodule in the left adrenal gland no significant change.    She underwent ultrasound of the abdomen that confirmed the diagnosis of cirrhosis of the liver with hepatosplenomegaly and ascites.  She was discharged on 5/20/2021.    She stopped drinking at age 38, she says that she is a social drinker. She denies any family history of colon cancer. She denies any fever, chills, or sweats. No chest pain or shortness of  breath.    She had liver biopsy ordered by Dr. Louis on 9/24/21. It was ordered for cirrhosis. She has no history of hepatitis infection or alcohol abuse. Biopsy was negative for malignancy. Just  fibrosis.              Chief Complaint: Other Misc (Pt reports no new concerns today.)        Interval History:  Patient presents today for follow up in surveillance for colon cancer to discuss CT scan results, accompanied by her daughter. She is doing well, remains active. She has no complaints today.  Denies pain, bleeding, fever, chills, sweats.  No chest pain or shortness of breath.    She will have repeat colonoscopy next year in 2024.       ROS: All 14 points ROS taken and as per Interval History  Review of Systems   Constitutional:  Negative for chills, fever and weight loss.   HENT:  Negative for congestion and nosebleeds.    Eyes:  Negative for blurred vision, double vision and photophobia.   Respiratory:  Negative for cough, hemoptysis and shortness of breath.    Cardiovascular:  Negative for chest pain, palpitations, leg swelling and PND.   Gastrointestinal:  Negative for abdominal pain, blood in stool, constipation, diarrhea, melena, nausea and vomiting.   Genitourinary:  Negative for dysuria, frequency, hematuria and urgency.   Musculoskeletal:  Negative for back pain, falls and myalgias.   Skin:  Negative for itching and rash.   Neurological:  Negative for tremors, focal weakness, seizures, weakness and headaches.   Endo/Heme/Allergies:  Negative for environmental allergies. Does not bruise/bleed easily.   Psychiatric/Behavioral:  Negative for depression and suicidal ideas. The patient is not nervous/anxious.          Histories:  PMH/PSH/FH/SOCIAL/ALLERGIES AND MEDS REVIEWED AND UPDATED AS APPROPRIATE       Vitals:    08/30/23 1059   BP: 125/75   BP Location: Left arm   Patient Position: Sitting   Pulse: 63   Resp: 18   Temp: 98.1 °F (36.7 °C)   TempSrc: Oral   SpO2: 98%   Weight: 79.8 kg (176 lb)   Height:  "5' 4" (1.626 m)     Wt Readings from Last 6 Encounters:   08/30/23 79.8 kg (176 lb)   08/07/23 79.4 kg (175 lb)   07/28/23 80.7 kg (178 lb)   05/29/23 80.1 kg (176 lb 9.6 oz)   02/27/23 77.6 kg (171 lb)   11/17/22 78 kg (171 lb 14.4 oz)     Body mass index is 30.21 kg/m².  Body surface area is 1.9 meters squared.       Physical Exam  Constitutional:       Appearance: Normal appearance. She is obese.   HENT:      Head: Normocephalic and atraumatic.      Nose: Nose normal.      Mouth/Throat:      Mouth: Mucous membranes are moist.      Pharynx: Oropharynx is clear.   Eyes:      Comments: Right eye subconjunctival hemorrhage   Cardiovascular:      Rate and Rhythm: Normal rate and regular rhythm.   Pulmonary:      Breath sounds: Normal breath sounds.   Abdominal:      Palpations: Abdomen is soft.   Musculoskeletal:         General: Normal range of motion.      Cervical back: Normal range of motion and neck supple.   Skin:     General: Skin is warm and dry.      Capillary Refill: Capillary refill takes less than 2 seconds.   Neurological:      General: No focal deficit present.      Mental Status: She is alert.   Psychiatric:         Mood and Affect: Mood normal.     ECOG SCORE    0 - Fully active-able to carry on all pre-disease performance without restriction         Laboratory:  CBC with Differential:  Lab Results   Component Value Date    WBC 5.91 08/28/2023    RBC 4.74 08/28/2023    HGB 13.4 08/28/2023    HCT 42.2 08/28/2023    MCV 89.0 08/28/2023    MCH 28.3 08/28/2023    MCHC 31.8 (L) 08/28/2023    RDW 13.7 08/28/2023     (L) 08/28/2023    MPV 11.1 (H) 08/28/2023        CMP:  Sodium Level   Date Value Ref Range Status   08/28/2023 144 136 - 145 mmol/L Final     Potassium Level   Date Value Ref Range Status   08/28/2023 5.3 (H) 3.5 - 5.1 mmol/L Final     Carbon Dioxide   Date Value Ref Range Status   08/28/2023 30 23 - 31 mmol/L Final     Blood Urea Nitrogen   Date Value Ref Range Status   08/28/2023 24.8 " (H) 9.8 - 20.1 mg/dL Final     Creatinine   Date Value Ref Range Status   08/28/2023 0.81 0.55 - 1.02 mg/dL Final     Calcium Level Total   Date Value Ref Range Status   08/28/2023 10.3 (H) 8.4 - 10.2 mg/dL Final     Albumin Level   Date Value Ref Range Status   08/28/2023 4.1 3.4 - 4.8 g/dL Final     Bilirubin Total   Date Value Ref Range Status   08/28/2023 0.6 <=1.5 mg/dL Final     Alkaline Phosphatase   Date Value Ref Range Status   08/28/2023 49 40 - 150 unit/L Final     Aspartate Aminotransferase   Date Value Ref Range Status   08/28/2023 22 5 - 34 unit/L Final     Alanine Aminotransferase   Date Value Ref Range Status   08/28/2023 21 0 - 55 unit/L Final     Estimated GFR-Non    Date Value Ref Range Status   05/20/2022 >60 mls/min/1.73/m2 Final         Assessment:       1. Malignant neoplasm of colon, unspecified part of colon      1) pT2pN0M0--Stage I colon cancer--diagnosed in April 2021   NCCN guidelines (COL-3) for pathological stage T2, N0, M0   Surveillance (COL-8): colonoscopy in 1 year after surgery and then in 1 year for advanced adenoma, if no advanced adenoma then repeat in 3 yrs and then q 5 yrs.  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  These are benign lesions.  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   Colonoscopy due in 3/2024 with Dr. Louis  CT C/A/P every 6 months x 5 years--due end of 2/2024, ordered  RTC in 6 month with MD, labs prior: CBC, CMP, CEA    Encouraged to call for any questions or problems  The patient was given ample opportunity to ask questions and they were all answered to satisfaction; patient demonstrated understanding of what we discussed and is agreeable to the plan.     Adriana Kapadia MD  Hematology/Oncology      Professional Services   Marian PINTO  TAYA Bruno, acted solely as a scribe for and in the presence of Dr. Adriana Kapadia, who performed these services.

## 2023-10-30 PROBLEM — Z00.00 MEDICARE ANNUAL WELLNESS VISIT, SUBSEQUENT: Status: RESOLVED | Noted: 2022-07-27 | Resolved: 2023-10-30

## 2023-11-13 DIAGNOSIS — E11.9 TYPE 2 DIABETES MELLITUS WITHOUT COMPLICATION, WITH LONG-TERM CURRENT USE OF INSULIN: ICD-10-CM

## 2023-11-13 DIAGNOSIS — Z79.4 TYPE 2 DIABETES MELLITUS WITHOUT COMPLICATION, WITH LONG-TERM CURRENT USE OF INSULIN: ICD-10-CM

## 2023-11-13 RX ORDER — METFORMIN HYDROCHLORIDE 1000 MG/1
1000 TABLET ORAL 2 TIMES DAILY WITH MEALS
Qty: 180 TABLET | Refills: 3 | Status: SHIPPED | OUTPATIENT
Start: 2023-11-13 | End: 2024-11-12

## 2023-11-13 NOTE — TELEPHONE ENCOUNTER
----- Message from Zoie Castro sent at 11/13/2023 12:53 PM CST -----  Regarding: refill  Type:  RX Refill Request    Who Called: Wendie    Refill or New Rx:refill    RX Name and Strength:metFORMIN (GLUCOPHAGE) 1000 MG tablet    How is the patient currently taking it? (ex. 1XDay):    Is this a 30 day or 90 day RX:    Preferred Pharmacy with phone number:[facility] [phone]    Local or Mail Order:local     Ordering Provider:    Would the patient rather a call back or a response via [facility]?     Best Call Back Number:    Additional Information:

## 2023-11-16 ENCOUNTER — TELEPHONE (OUTPATIENT)
Dept: FAMILY MEDICINE | Facility: CLINIC | Age: 83
End: 2023-11-16
Payer: MEDICARE

## 2023-11-16 DIAGNOSIS — E03.9 HYPOTHYROIDISM, UNSPECIFIED TYPE: Primary | ICD-10-CM

## 2023-11-16 RX ORDER — LEVOTHYROXINE SODIUM 75 UG/1
75 TABLET ORAL DAILY
Qty: 90 TABLET | Refills: 1 | Status: SHIPPED | OUTPATIENT
Start: 2023-11-16

## 2023-11-16 NOTE — TELEPHONE ENCOUNTER
----- Message from Sveta Pierson sent at 11/16/2023  9:38 AM CST -----  Regarding: med refill  .Type:  RX Refill Request    Who Called: Pt's daughter Felicitas  Refill or New Rx:Refill  RX Name and Strength:  How is the patient currently taking it? (ex. 1XDay):1xday  Is this a 30 day or 90 day RX:90  Preferred Pharmacy with phone number:[facility] #[id number] at [address]   Local or Mail Order:Local  Ordering Provider:Allison  Would the patient rather a call back or a response via [facility]? Call back  Best Call Back Number:[phone]  Additional Information:

## 2023-11-16 NOTE — TELEPHONE ENCOUNTER
----- Message from Sveta Pierson sent at 11/16/2023 10:16 AM CST -----  Regarding: med refill  Who Called: Pt's daughter Felicitas   Refill or New Rx:Refill   RX Name and Strength: levothyroxine (SYNTHROID) 75 MCG tablet   How is the patient currently taking it? (ex. 1XDay):1xday   Is this a 30 day or 90 day RX:90   Preferred Pharmacy with phone number:[id number] at [address] [place]   Local or Mail Order:Local   Ordering Provider:Allison   Would the patient rather a call back or a response via [facility]? Call back   Best Call Back Number:[phone]   Additional Information:

## 2023-12-07 RX ORDER — SPIRONOLACTONE 25 MG/1
12.5 TABLET ORAL DAILY
Qty: 90 TABLET | Refills: 3 | Status: SHIPPED | OUTPATIENT
Start: 2023-12-07

## 2023-12-07 RX ORDER — PRAVASTATIN SODIUM 20 MG/1
20 TABLET ORAL NIGHTLY
Qty: 90 TABLET | Refills: 3 | Status: SHIPPED | OUTPATIENT
Start: 2023-12-07

## 2023-12-07 NOTE — TELEPHONE ENCOUNTER
----- Message from Sveta Pierson sent at 12/7/2023 10:55 AM CST -----  Regarding: med refill  .Type:  RX Refill Request    Who Called: Pt's daughter Yvertte  Refill or New Rx:Refill  RX Name and Strength:spironolactone (ALDACTONE) 25 MG tablet   How is the patient currently taking it? (ex. 1XDay):  Is this a 30 day or 90 day RX:90  Preferred Pharmacy with phone number:[facility] #[id number] at [address] [place] [facility]   Local or Mail Order:local  Ordering Provider:Allison  Would the patient rather a call back or a response via [facility]? Call back  Best Call Back Number:[phone]  Additional Information: Requesting a 90 supply    .Type:  RX Refill Request    Who Called: Pt's daughter Yvertte  Refill or New Rx:Refill  RX Name and Strength:pravastatin (PRAVACHOL) 20 MG tablet   How is the patient currently taking it? (ex. 1XDay):  Is this a 30 day or 90 day RX:90  Preferred Pharmacy with phone number:[facility] #[id number] at [address]   Local or Mail Order:local  Ordering Provider:Allison  Would the patient rather a call back or a response via [facility]? Call back  Best Call Back Number:[phone]  Additional Information: Requesting a 90 day supply

## 2023-12-11 ENCOUNTER — PATIENT MESSAGE (OUTPATIENT)
Dept: ADMINISTRATIVE | Facility: HOSPITAL | Age: 83
End: 2023-12-11
Payer: MEDICARE

## 2023-12-13 NOTE — TELEPHONE ENCOUNTER
----- Message from Latricia Mejias sent at 12/13/2023  4:27 PM CST -----  .Type:  RX Refill Request    Who Called: pt's daughter Felicitas  Refill or New Rx:refill   RX Name and Strength:TRUE METRIX GLUCOSE TEST STRIP Strp  How is the patient currently taking it? (ex. 1XDay):1x   Is this a 30 day or 90 day RX:90  Preferred Pharmacy with phone number:[facility]  [place] in [place]   Local or Mail Order:local   Ordering Provider:osmani   Would the patient rather a call back or a response via [facility]? Call back   Best Call Back Number:[phone]  Additional Information:

## 2024-01-02 ENCOUNTER — OFFICE VISIT (OUTPATIENT)
Dept: FAMILY MEDICINE | Facility: CLINIC | Age: 84
End: 2024-01-02
Payer: MEDICARE

## 2024-01-02 ENCOUNTER — TELEPHONE (OUTPATIENT)
Dept: FAMILY MEDICINE | Facility: CLINIC | Age: 84
End: 2024-01-02

## 2024-01-02 ENCOUNTER — LAB VISIT (OUTPATIENT)
Dept: LAB | Facility: HOSPITAL | Age: 84
End: 2024-01-02
Attending: FAMILY MEDICINE
Payer: MEDICARE

## 2024-01-02 VITALS
DIASTOLIC BLOOD PRESSURE: 70 MMHG | TEMPERATURE: 98 F | OXYGEN SATURATION: 98 % | RESPIRATION RATE: 16 BRPM | WEIGHT: 171.31 LBS | HEART RATE: 66 BPM | SYSTOLIC BLOOD PRESSURE: 116 MMHG | HEIGHT: 64 IN | BODY MASS INDEX: 29.24 KG/M2

## 2024-01-02 DIAGNOSIS — R09.89 CHEST CONGESTION: ICD-10-CM

## 2024-01-02 DIAGNOSIS — J06.9 ACUTE UPPER RESPIRATORY INFECTION, UNSPECIFIED: ICD-10-CM

## 2024-01-02 DIAGNOSIS — R09.89 CHEST CONGESTION: Primary | ICD-10-CM

## 2024-01-02 DIAGNOSIS — B33.8 RSV INFECTION: Primary | ICD-10-CM

## 2024-01-02 DIAGNOSIS — R19.7 DIARRHEA, UNSPECIFIED TYPE: ICD-10-CM

## 2024-01-02 LAB
FLUAV AG UPPER RESP QL IA.RAPID: NOT DETECTED
FLUBV AG UPPER RESP QL IA.RAPID: NOT DETECTED
RSV A 5' UTR RNA NPH QL NAA+PROBE: DETECTED
SARS-COV-2 RNA RESP QL NAA+PROBE: NOT DETECTED

## 2024-01-02 PROCEDURE — 0241U COVID/RSV/FLU A&B PCR: CPT

## 2024-01-02 PROCEDURE — 99214 OFFICE O/P EST MOD 30 MIN: CPT | Mod: ,,, | Performed by: FAMILY MEDICINE

## 2024-01-02 RX ORDER — BENZONATATE 100 MG/1
100 CAPSULE ORAL 3 TIMES DAILY PRN
Qty: 30 CAPSULE | Refills: 1 | Status: SHIPPED | OUTPATIENT
Start: 2024-01-02

## 2024-01-02 RX ORDER — PEN NEEDLE, DIABETIC 32GX 5/32"
NEEDLE, DISPOSABLE MISCELLANEOUS
COMMUNITY
Start: 2023-12-13

## 2024-01-02 RX ORDER — AZITHROMYCIN 250 MG/1
TABLET, FILM COATED ORAL
Qty: 6 TABLET | Refills: 0 | Status: SHIPPED | OUTPATIENT
Start: 2024-01-02 | End: 2024-01-07

## 2024-01-02 RX ORDER — IPRATROPIUM BROMIDE AND ALBUTEROL SULFATE 2.5; .5 MG/3ML; MG/3ML
3 SOLUTION RESPIRATORY (INHALATION) EVERY 6 HOURS PRN
Qty: 75 ML | Refills: 0 | Status: SHIPPED | OUTPATIENT
Start: 2024-01-02 | End: 2025-01-01

## 2024-01-02 NOTE — PROGRESS NOTES
Subjective:        Patient ID: Wendie Ferreira is a 83 y.o. female.    Chief Complaint: Follow-up (Patient reports cough, congestion, ear pain, sore throat /Patient also incontinent of bowels )      presents to clinic with family member with complaint.  She is due for wellness visit in july.       She c/o productive cough of yellow- worse at night, nasal congestion/drainage- clear, sore throat and ear pain x 5 days.  Started off with cough. Great grandchild was sick with similar symptoms. No fever. Took mucinex otc.        Has been incontinent of bowels 3x/week.  Not related to cough. Having liquid stool.  Will reach out to dr. Louis- GI. Has lost weight since lov (5#).  Appetite is ok but scared to eat.                  labs 1/2021 showed anemia so fobt and ua were ordered. her fobt was positive 1/27/21 so she was referred to GI. She had a colonoscopy with dr. louis on 3/15/21 which showed hemorrhoids but also a mass in the ascending colon. biopsy showed tubular adenoma. she had ct chest which was negative for disease progression 4/5/21. she had laparoscopic/robotic right hemicolectomy with dr. lia duran on 4/22/21. she was discharged home on 4/25/21 oral iron. she went to the Er on 5/20- 5/24/21 for SOB and anemia.  was also given bactrim for erythema of her incision. she also received 1 UPRBC while inpatient as well as IV iron. she was discharged and told to stop norvasc, benazepril. she has also been off lasix. she is followed by oncology, dr. whittington. has follow up scheduled in 8/2022 with labs. repeat colonoscopy with dr. louis is due 3/29/2022. She states she completed however we do not have documentation. Will request from his office.   EGD with dr. louis 7/26/21- showed 3 varices of lower esophagus and gastritis. Repeat EGD in 2 years and US guided liver biopsy 9/2021.  She is eating and drinking well. Denies problems with urine or bowel.  No blood in stool.    has EGD scheduled with dr. Louis 8/2023.  " She follows with dr. Kapadia. As well.  Has appt for labs and ct 8/2023     Patient has diabetes. Currently she is on metformin 500mg bid and Victoza 1.2mg. She has been using and tolerated well. Her eye exams are done by Dr. Andersen. Will request.  Her last foot exam was 7/2023. a1c 7.4 7/2023. checking cbgs average 115. urine micro 7/2023     She also has CAD, hypertension and hyperlipidemia. Her cardiologist is Dr. Saab. No changes. She is not on a baby aspirin.  Not on plavix due to varices.      She also has low vitamin D and supplements otc. Level 7/2023 wnl     Has hypothyroidism. on Synthroid. tsh wnl 7/2023.     She also has anxiety and is on Lexapro. Happy with dosing.      Her mammogram in 8/2022 was normal. does every other year. Denies family history of breast cancer. Her bone density test in 8/2022 showed osteopenia. Repeat due 8/2024     She is ALLERGIC to codeine. She does not drink alcohol or smoke. She reports she had shingles (8/2015) and pneumonia shots at [facility].       Review of Systems   Constitutional:  Positive for fatigue. Negative for fever.   HENT:  Positive for congestion, ear pain and sore throat. Negative for rhinorrhea, sinus pressure and sneezing.    Respiratory:  Positive for cough.    Gastrointestinal:  Positive for diarrhea.   All other systems reviewed and are negative.        Review of patient's allergies indicates:   Allergen Reactions    Adhesive      Other reaction(s): Skin tear  adhesive tape    Codeine      "Feels like I'm having a heart attack"    Milk      Other reaction(s): GAS      Vitals:    01/02/24 1409   BP: 116/70   BP Location: Left arm   Pulse: 66   Resp: 16   Temp: 98.3 °F (36.8 °C)   TempSrc: Temporal   SpO2: 98%   Weight: 77.7 kg (171 lb 4.8 oz)   Height: 5' 4" (1.626 m)      Social History     Socioeconomic History    Marital status:    Tobacco Use    Smoking status: Former     Types: Cigarettes    Smokeless tobacco: Never   Substance and Sexual " Activity    Alcohol use: Not Currently    Drug use: Never    Sexual activity: Not Currently     Social Determinants of Health     Financial Resource Strain: Patient Declined (1/2/2024)    Overall Financial Resource Strain (CARDIA)     Difficulty of Paying Living Expenses: Patient declined   Food Insecurity: Patient Declined (1/2/2024)    Hunger Vital Sign     Worried About Running Out of Food in the Last Year: Patient declined     Ran Out of Food in the Last Year: Patient declined   Transportation Needs: No Transportation Needs (1/2/2024)    PRAPARE - Transportation     Lack of Transportation (Medical): No     Lack of Transportation (Non-Medical): No   Physical Activity: Insufficiently Active (1/2/2024)    Exercise Vital Sign     Days of Exercise per Week: 2 days     Minutes of Exercise per Session: 10 min   Stress: No Stress Concern Present (1/2/2024)    [employer] [place] [employer] - Occupational Stress Questionnaire     Feeling of Stress : Not at all   Social Connections: Unknown (1/2/2024)    Social Connection and Isolation Panel [NHANES]     Frequency of Communication with Friends and Family: More than three times a week     Frequency of Social Gatherings with Friends and Family: More than three times a week     Active Member of Clubs or Organizations: No     Attends Club or Organization Meetings: Never     Marital Status:    Housing Stability: Unknown (1/2/2024)    Housing Stability Vital Sign     Unable to Pay for Housing in the Last Year: Patient declined     Unstable Housing in the Last Year: No      Family History   Problem Relation Age of Onset    Hypertension Mother     Heart failure Mother     Diabetes Father     Hypertension Sister           Objective:     Physical Exam  Vitals and nursing note reviewed.   Constitutional:       Appearance: Normal appearance. She is normal weight.   HENT:      Head: Normocephalic and atraumatic.      Nose: Nose normal.      Mouth/Throat:      Mouth:  "Mucous membranes are moist.      Pharynx: Oropharynx is clear.   Eyes:      Extraocular Movements: Extraocular movements intact.   Cardiovascular:      Rate and Rhythm: Normal rate and regular rhythm.      Pulses: Normal pulses.      Heart sounds: Normal heart sounds.   Pulmonary:      Effort: Pulmonary effort is normal.      Breath sounds: Normal breath sounds.   Musculoskeletal:         General: Normal range of motion.      Cervical back: Normal range of motion.   Skin:     General: Skin is warm and dry.   Neurological:      General: No focal deficit present.      Mental Status: She is alert and oriented to person, place, and time. Mental status is at baseline.   Psychiatric:         Mood and Affect: Mood normal.       Current Outpatient Medications on File Prior to Visit   Medication Sig Dispense Refill    BD STEPHAN 2ND GEN PEN NEEDLE 32 gauge x 5/32" Ndle USE AS DIRECTED ONCE DAILY      blood sugar diagnostic (TRUE METRIX GLUCOSE TEST STRIP) Strp Inject 1 each into the skin Daily. 200 strip 11    cholecalciferol, vitamin D3, (VITAMIN D3) 50 mcg (2,000 unit) Tab Take 2,000 Int'l Units by mouth.      EScitalopram oxalate (LEXAPRO) 10 MG tablet Take 1 tablet (10 mg total) by mouth once daily. 90 tablet 3    famotidine (PEPCID) 40 MG tablet Take 40 mg by mouth every evening.      lancets (TRUEPLUS LANCETS) 28 gauge Misc USE TO TEST ONCE DAILY 100 each 11    levothyroxine (SYNTHROID) 75 MCG tablet Take 1 tablet (75 mcg total) by mouth once daily. 90 tablet 1    liraglutide 0.6 mg/0.1 mL, 18 mg/3 mL, subq PNIJ (VICTOZA 2-MARTA) 0.6 mg/0.1 mL (18 mg/3 mL) PnIj pen Inject 1.2 mg into the skin once daily.      metFORMIN (GLUCOPHAGE) 1000 MG tablet Take 1 tablet (1,000 mg total) by mouth 2 (two) times daily with meals. 180 tablet 3    nitroGLYCERIN (NITROSTAT) 0.4 MG SL tablet Place 0.4 mg under the tongue.      pravastatin (PRAVACHOL) 20 MG tablet Take 1 tablet (20 mg total) by mouth every evening. 90 tablet 3    " "spironolactone (ALDACTONE) 25 MG tablet Take 0.5 tablets (12.5 mg total) by mouth once daily. 90 tablet 3    UNABLE TO FIND 1 lancet once daily.      UNABLE TO FIND   BD pen needle shrt [id number] (5/16) ROMA, See Instructions, use daily to inject insulin. dx. e11.9, # 100 EA, 11 Refill(s), Pharmacy: [id number], 164, cm, Height/Length Dosing, 06/02/21 21:58:00 CDT, 73.2, kg, Weight Dosing, 06/02/21 21:...      [DISCONTINUED] pen needle, diabetic (BD ULTRA-FINE SHORT PEN NEEDLE) 31 gauge x 5/16" Ndle 100 each by Misc.(Non-Drug; Combo Route) route once daily. 100 each 11     No current facility-administered medications on file prior to visit.     Health Maintenance   Topic Date Due    Eye Exam  11/14/2023    Hemoglobin A1c  01/25/2024    Shingles Vaccine (2 of 2) 07/28/2024 (Originally 12/28/2022)    Lipid Panel  07/25/2024    DEXA Scan  08/05/2025    TETANUS VACCINE  09/09/2027      Results for orders placed or performed in visit on 01/02/24   COVID/RSV/FLU A&B PCR   Result Value Ref Range    Influenza A PCR Not Detected Not Detected    Influenza B PCR Not Detected Not Detected    Respiratory Syncytial Virus PCR Detected (A) Not Detected    SARS-CoV-2 PCR Not Detected Not Detected, Negative          Assessment & Plan:     Active Problem List with Overview Notes    Diagnosis Date Noted    RSV infection 01/02/2024    Diarrhea 01/02/2024    Postmenopausal 07/27/2022    Breast cancer screening by mammogram 07/27/2022    Hypothyroidism 07/27/2022    Type 2 diabetes mellitus 07/27/2022    Vitamin D deficiency 07/27/2022    Class 1 obesity due to excess calories with serious comorbidity and body mass index (BMI) of 30.0 to 30.9 in adult 07/27/2022    Anxiety 07/27/2022    Arteriosclerosis of coronary artery 07/27/2022    Dyslipidemia 07/27/2022    Osteopenia 07/27/2022    Hypertension 07/27/2022    Esophageal varices without bleeding 07/27/2022    Advanced care planning/counseling discussion 07/27/2022    Colon " cancer 05/23/2022       1. RSV infection  Assessment & Plan:  Negative flu and covid.  +RSV. Patient informed at time of appt.  Will send in zpack and tessalon perles. Ok to use mucinex otc. Encouraged fluids and rest. Will also send in duoneb if needed.  Monitor symptoms closely. Contact clinic for concerns. Patient and family member are agreeable to plan and verbalized understanding    Orders:  -     azithromycin (Z-MARTA) 250 MG tablet; Take 2 tablets by mouth on day 1; Take 1 tablet by mouth on days 2-5  Dispense: 6 tablet; Refill: 0  -     benzonatate (TESSALON) 100 MG capsule; Take 1 capsule (100 mg total) by mouth 3 (three) times daily as needed for Cough.  Dispense: 30 capsule; Refill: 1  -     albuterol-ipratropium (DUO-NEB) 2.5 mg-0.5 mg/3 mL nebulizer solution; Take 3 mLs by nebulization every 6 (six) hours as needed for Wheezing. Rescue  Dispense: 75 mL; Refill: 0    2. Diarrhea, unspecified type  Assessment & Plan:  Keep food journal. Follow BRAT diet.  Follow up with GI - dr. Missy CANO           Follow up for as scheduled 2/2024 or sooner prn.

## 2024-01-02 NOTE — TELEPHONE ENCOUNTER
Please call patient to see if she would like to be swabbed for flu or covid prior to her appt. If yes, I can place order so she can do prior to appt today so we can know results at her visit hopefully     I have signed for the following orders AND/OR meds.  Please call the patient and ask the patient to schedule the testing AND/OR inform about any medications that were sent.      Orders Placed This Encounter   Procedures    COVID/RSV/FLU A&B PCR     Standing Status:   Future     Standing Expiration Date:   3/2/2025

## 2024-01-02 NOTE — ASSESSMENT & PLAN NOTE
Negative flu and covid.  +RSV. Patient informed at time of appt.  Will send in zpack and tessalon perles. Ok to use mucinex otc. Encouraged fluids and rest. Will also send in duoneb if needed.  Monitor symptoms closely. Contact clinic for concerns. Patient and family member are agreeable to plan and verbalized understanding

## 2024-01-24 ENCOUNTER — TELEPHONE (OUTPATIENT)
Dept: HEMATOLOGY/ONCOLOGY | Facility: CLINIC | Age: 84
End: 2024-01-24
Payer: MEDICARE

## 2024-01-24 ENCOUNTER — TELEPHONE (OUTPATIENT)
Dept: FAMILY MEDICINE | Facility: CLINIC | Age: 84
End: 2024-01-24
Payer: MEDICARE

## 2024-01-24 NOTE — TELEPHONE ENCOUNTER
----- Message from Latricia Mejias sent at 1/24/2024 10:34 AM CST -----  .Type:  Patient Returning Call    Who Called:pt's daughter   Who Left Message for Patient:  Does the patient know what this is regarding?:lab orders   Would the patient rather a call back or a response via [facility]? Call back   Best Call Back Number:[phone]  Additional Information: Pt is requesting the lab orders be mailed to the pt's home ([address] [place] [address] [address] [id number])  . Pt will be going to [facility]

## 2024-02-07 ENCOUNTER — DOCUMENTATION ONLY (OUTPATIENT)
Dept: FAMILY MEDICINE | Facility: CLINIC | Age: 84
End: 2024-02-07

## 2024-02-07 LAB
LEFT EYE DM RETINOPATHY: NEGATIVE
RIGHT EYE DM RETINOPATHY: NEGATIVE

## 2024-02-15 ENCOUNTER — DOCUMENTATION ONLY (OUTPATIENT)
Dept: FAMILY MEDICINE | Facility: CLINIC | Age: 84
End: 2024-02-15
Payer: MEDICARE

## 2024-02-15 LAB — HBA1C MFR BLD: 6.1 % (ref 4–6)

## 2024-02-19 ENCOUNTER — DOCUMENTATION ONLY (OUTPATIENT)
Dept: FAMILY MEDICINE | Facility: CLINIC | Age: 84
End: 2024-02-19
Payer: MEDICARE

## 2024-02-21 ENCOUNTER — TELEPHONE (OUTPATIENT)
Dept: FAMILY MEDICINE | Facility: CLINIC | Age: 84
End: 2024-02-21

## 2024-02-21 ENCOUNTER — HOSPITAL ENCOUNTER (OUTPATIENT)
Dept: RADIOLOGY | Facility: HOSPITAL | Age: 84
Discharge: HOME | End: 2024-02-21
Attending: INTERNAL MEDICINE
Payer: MEDICARE

## 2024-02-21 ENCOUNTER — PATIENT MESSAGE (OUTPATIENT)
Dept: HEMATOLOGY/ONCOLOGY | Facility: CLINIC | Age: 84
End: 2024-02-21
Payer: MEDICARE

## 2024-02-21 DIAGNOSIS — C18.9 MALIGNANT NEOPLASM OF COLON, UNSPECIFIED PART OF COLON: ICD-10-CM

## 2024-02-21 LAB
CREAT SERPL-MCNC: 0.7 MG/DL (ref 0.5–1.4)
SAMPLE: NORMAL

## 2024-02-21 PROCEDURE — 25500020 PHARM REV CODE 255: Performed by: INTERNAL MEDICINE

## 2024-02-21 PROCEDURE — 74177 CT ABD & PELVIS W/CONTRAST: CPT | Mod: TC

## 2024-02-21 RX ADMIN — IOPAMIDOL 100 ML: 755 INJECTION, SOLUTION INTRAVENOUS at 10:02

## 2024-02-21 RX ADMIN — DIATRIZOATE MEGLUMINE AND DIATRIZOATE SODIUM 30 ML: 600; 100 SOLUTION ORAL; RECTAL at 09:02

## 2024-02-21 NOTE — TELEPHONE ENCOUNTER
Returned call to pts daughter (Felicitas). Informed of lab results.    Liver, kidney WNL; A1C is 6.1 DM well controlled.  Daughter currently at 's appt; will call office when she gets home, with alternative medications that is similar to Victoza and on insurance's formulary.

## 2024-02-21 NOTE — TELEPHONE ENCOUNTER
Pt call for lab results    Please inform    Liver, kidney WNL    A1C is 6.1 DM well controlled    Pt mentioned insurance no longer covers Victoza and she needs to be changed to an alternative; please ask patient if the insurance sent her a list of the alternative medication?  If not, I will change her to something similar.

## 2024-02-27 ENCOUNTER — PATIENT MESSAGE (OUTPATIENT)
Dept: FAMILY MEDICINE | Facility: CLINIC | Age: 84
End: 2024-02-27
Payer: MEDICARE

## 2024-03-20 ENCOUNTER — OFFICE VISIT (OUTPATIENT)
Dept: FAMILY MEDICINE | Facility: CLINIC | Age: 84
End: 2024-03-20
Payer: MEDICARE

## 2024-03-20 ENCOUNTER — OFFICE VISIT (OUTPATIENT)
Dept: HEMATOLOGY/ONCOLOGY | Facility: CLINIC | Age: 84
End: 2024-03-20
Payer: MEDICARE

## 2024-03-20 VITALS
DIASTOLIC BLOOD PRESSURE: 75 MMHG | WEIGHT: 169.63 LBS | TEMPERATURE: 98 F | BODY MASS INDEX: 28.96 KG/M2 | HEIGHT: 64 IN | SYSTOLIC BLOOD PRESSURE: 122 MMHG | HEART RATE: 81 BPM | OXYGEN SATURATION: 95 % | RESPIRATION RATE: 18 BRPM

## 2024-03-20 DIAGNOSIS — D69.6 THROMBOCYTOPENIA: ICD-10-CM

## 2024-03-20 DIAGNOSIS — E11.9 TYPE 2 DIABETES MELLITUS WITHOUT COMPLICATION, WITHOUT LONG-TERM CURRENT USE OF INSULIN: Primary | ICD-10-CM

## 2024-03-20 DIAGNOSIS — D72.819 LEUKOPENIA, UNSPECIFIED TYPE: ICD-10-CM

## 2024-03-20 DIAGNOSIS — C18.2 MALIGNANT NEOPLASM OF ASCENDING COLON: Primary | ICD-10-CM

## 2024-03-20 PROCEDURE — 99215 OFFICE O/P EST HI 40 MIN: CPT | Mod: PBBFAC | Performed by: INTERNAL MEDICINE

## 2024-03-20 PROCEDURE — 99214 OFFICE O/P EST MOD 30 MIN: CPT | Mod: S$PBB,,, | Performed by: INTERNAL MEDICINE

## 2024-03-20 PROCEDURE — 99999 PR PBB SHADOW E&M-EST. PATIENT-LVL V: CPT | Mod: PBBFAC,,, | Performed by: INTERNAL MEDICINE

## 2024-03-20 PROCEDURE — 99442 PR PHYSICIAN TELEPHONE EVALUATION 11-20 MIN: CPT | Mod: 95,,, | Performed by: FAMILY MEDICINE

## 2024-03-20 RX ORDER — DULAGLUTIDE 0.75 MG/.5ML
0.75 INJECTION, SOLUTION SUBCUTANEOUS
Qty: 4 PEN | Refills: 1 | Status: SHIPPED | OUTPATIENT
Start: 2024-03-20 | End: 2024-05-20 | Stop reason: SDUPTHER

## 2024-03-20 NOTE — PROGRESS NOTES
Patient ID: [id number]     Chief Complaint: Medication Problem        HPI:   Disclaimer:  This note is prepared using voice recognition software and as such is likely to have errors despite attempts at proofreading. Please contact me for questions.     This is a telemedicine note. Patient was treated using telemedicine, real time audio and video, according to [facility] protocols. Erika PINTO MD, conducted the visit from the [facility]. The patient participated in the visit at a non-[facility] location selected by the patient, identified below. I am licensed in the state where the patient stated they are located. The patient stated that they understood and accepted the privacy and security risks to their information at their location. This visit is not recorded. The patient is located at her home.      Wendie Ferreira is a 84 y.o. female who presents accompanied by her daughter, via telemedicine for visit today.    Ms. Pressley has a history of diabetes mellitus type 2 with last hemoglobin A1c 6.1 in 02/2024.  The patient's daughter states that they received a noticed that her insurance will no longer cover Victoza and they are requesting alternative medication.  The patient's daughter has a list of medications that will be covered including Ozempic, Trulicity, Bydureon, and Mounjaro.  The patient's daughter states that they are hesitant to start Ozempic and majora as there new medications on the marked.  She is amenable to trial of Trulicity.      Past Medical History:   Diagnosis Date    Acid reflux     Colon cancer     Dyslipidemia     Esophageal varices     Heart attack         Past Surgical History:   Procedure Laterality Date    CARPAL TUNNEL RELEASE      CATARACT EXTRACTION Right 09/2014    CHOLECYSTECTOMY      COLECTOMY Right 04/22/2021    Ascending    CORONARY STENT PLACEMENT      EGD, WITH CLOSED BIOPSY  8/7/2023    Procedure: EGD, WITH CLOSED BIOPSY;  Surgeon: Rich Louis MD;   "Location: [facility] ENDOSCOPY;  Service: Gastroenterology;;    ESOPHAGOGASTRODUODENOSCOPY N/A 8/7/2023    Procedure: EGD;  Surgeon: Rich Louis MD;  Location: [facility] ENDOSCOPY;  Service: Gastroenterology;  Laterality: N/A;    EYE SURGERY  2014    Right eye Cataracts    SMALL INTESTINE SURGERY  April 2021    Colon cancer 12 colon resection    TUBAL LIGATION  1980????       Review of patient's allergies indicates:   Allergen Reactions    Adhesive      Other reaction(s): Skin tear  adhesive tape    Codeine      "Feels like I'm having a heart attack"    Milk      Other reaction(s): GAS       No outpatient medications have been marked as taking for the 3/20/24 encounter (Office Visit) with Erika Dey MD.       Social History     Socioeconomic History    Marital status:    Tobacco Use    Smoking status: Former     Types: Cigarettes    Smokeless tobacco: Never   Substance and Sexual Activity    Alcohol use: Not Currently    Drug use: Never    Sexual activity: Not Currently     Social Determinants of Health     Financial Resource Strain: Patient Declined (1/2/2024)    Overall Financial Resource Strain (CARDIA)     Difficulty of Paying Living Expenses: Patient declined   Food Insecurity: Patient Declined (1/2/2024)    Hunger Vital Sign     Worried About Running Out of Food in the Last Year: Patient declined     Ran Out of Food in the Last Year: Patient declined   Transportation Needs: No Transportation Needs (1/2/2024)    PRAPARE - Transportation     Lack of Transportation (Medical): No     Lack of Transportation (Non-Medical): No   Physical Activity: Insufficiently Active (1/2/2024)    Exercise Vital Sign     Days of Exercise per Week: 2 days     Minutes of Exercise per Session: 10 min   Stress: No Stress Concern Present (1/2/2024)    [employer] [place] [employer] - Occupational Stress Questionnaire     Feeling of Stress : Not at all   Social Connections: Unknown (1/2/2024)    Social " Connection and Isolation Panel [NHANES]     Frequency of Communication with Friends and Family: More than three times a week     Frequency of Social Gatherings with Friends and Family: More than three times a week     Active Member of Clubs or Organizations: No     Attends Club or Organization Meetings: Never     Marital Status:    Housing Stability: Unknown (1/2/2024)    Housing Stability Vital Sign     Unable to Pay for Housing in the Last Year: Patient declined     Unstable Housing in the Last Year: No        Family History   Problem Relation Age of Onset    Hypertension Mother     Heart failure Mother     Diabetes Father     Hypertension Sister         Subjective:     Review of Systems   Constitutional:  Negative for chills, diaphoresis and fever.   Respiratory:  Negative for cough and shortness of breath.    Cardiovascular:  Negative for chest pain.   Gastrointestinal:  Negative for abdominal pain, diarrhea, nausea and vomiting.   Neurological:  Negative for dizziness.   See HPI for details  All Other ROS: Negative except as stated in HPI.       Objective:     There were no vitals taken for this visit.    Physical Exam    Physical Exam: LIMITED DUE TO TELEMEDICINE RESTRICTIONS.  General: Alert and oriented, No acute distress.  Head: Normocephalic, Atraumatic.  Eye: Sclera non-icteric.  Neck/Thyroid:  Full range of motion.  Respiratory: Non-labored respirations, Symmetrical chest wall expansion.  Musculoskeletal: Normal range of motion.  Integumentary: Warm, Dry, Intact, No visible suspicious lesions or rashes. No diaphoresis.   Neurologic: No focal deficits  Psychiatric: Normal interaction, Coherent speech, Euthymic mood, Appropriate affect     Assessment:       ICD-10-CM ICD-9-CM   1. Type 2 diabetes mellitus without complication, without long-term current use of insulin  E11.9 250.00        Plan:     Problem List Items Addressed This Visit          Endocrine    Type 2 diabetes mellitus - Primary     Relevant Medications    dulaglutide (TRULICITY) 0.75 mg/0.5 mL pen injector    1. Type 2 diabetes mellitus without complication, without long-term current use of insulin  - dulaglutide (TRULICITY) 0.75 mg/0.5 mL pen injector; Inject 0.75 mg into the skin every 7 days.  Dispense: 4 pen ; Refill: 1  Continue metformin as previously prescribed.    Trial of Trulicity 0.75 mg weekly.  Medication will be titrated as needed.  Monitor blood sugar at home.  Notify MD if blood sugar <80 or >200.  ADA diet and exercise recommended.  Annual eye exam and home daily foot exams recommended.  Seek immediate medical treatment for blood sugar <70 or >300 and for symptoms including diaphoresis, chest pain, SOB, tremors, syncope or any other worsening symptoms.      Follow up in about 1 month (around 4/20/2024) for Virtual Visit, DM2 Follow Up.    Video Time Documentation:  Spent 15 minutes with patient face to face via telemedicine discussing health concerns and completing EHR. More than 50% of this time was spent in counseling and coordination of care.

## 2024-03-20 NOTE — PROGRESS NOTES
HEMATOLOGY/ONCOLOGY OFFICE CLINIC VISIT    Visit Information:    Initial Evaluation: 5/26/2021  Referring Provider: Dr. Robert Conner  Other providers:  Code status:  Not addressed    Diagnosis:  T2N0M0-Stage I Colon cancer. Dx on 4/22/21    Present treatment:    Surveillance    Treatment/Oncology history:  3/15/2021:Colonoscopy: malignant partially obstructing tumor in the ascending colon. Biopsy tubular adenoma with at least high-grade dysplasia.   4/22/2021: Laparoscopic/robotic right hemicolectomy     Plan of care: Surveillance      Imaging:  CT A/P 3/17/2021 at [facility]: irregular colonic mass 5 to 6 cm in length proximal to midportion of descending colon.  Enhancing soft tissue component contacts and may invade the right lateral abdominal wall.  No regional or distant lymphadenopathy identified.  Cirrhotic liver with portal hypertension, small volume ascites and splenomegaly.  No focal lesion identified in the liver.    CT of the thorax 4/5/2021: no thoracic metastatic disease identified.  Small volume ascites and borderline splenomegaly.No adenopathy or distant metastases.  CT angiogram 5/20/2021: negative for pulmonary embolism but lungs demonstrate mild heterogeneous attenuation with subtle peripheral reticular opacities.  Primary differential consideration is small airways disease.  Findings may be secondary to multifocal infiltrate from infection versus pulmonary edema.  It also showed moderate ascites with increase in size since prior examination.  Liver with nodular contour and was enlarged.  Spleen borderline enlarged.  A 1.3 x 1 cm enhancing nodule in the left adrenal gland no significant change.  Ultrasound of the abdomen 5/23/2021:cirrhosis of the liver with hepatosplenomegaly and ascites.   Ultrasound of the abdomen 10/26/2022: The pancreas appears grossly unremarkable.  No pancreatic mass or lesion is seen. liver is slightly enlarged in size. Liver is echogenic it measures 16 cm by my  measurements..  No liver mass or lesion is seen. spleen appears enlarged and measures 14.5 cm.  Hepatomegaly with findings consistent with hepatic steatosis  CT C/A/P 2/16/2023:     1. No evidence of metastatic disease within the chest, abdomen and pelvis  2. Cirrhotic morphology of the liver  3. Changes of portal hypertension including borderline splenomegaly and portosystemic collateral  4. Mild interstitial scarring within the lungs.  CT C/A/P  8/28/2023:  No new suspicious findings chest, abdomen or pelvis.  Chronic findings above.  CT C/A/P 2/21/2024: No detrimental change identified since 08/28/2023.       Pathology:  4/22/2022:   RIGHT COLON, HEMICOLECTOMY: ADENOCARCINOMA, WELL DIFFERENTIATED, 90% MUCINOUS.   --  Greatest tumor dimension, 7.0 cm (as measured grossly).  --  Adenocarcinoma arises from tubular adenoma with high grade dysplasia.  --  Lymphovascular invasion, not identified.  --  Adenocarcinoma superficially invades muscularis propria.  --  Eighteen mesenteric lymph nodes, no neoplasm (0/18).  --  Surgical margins, uninvolved.  POSTOPERATIVE SPINDLE CELL PSEUDOTUMOR, 1.5 CM.  -  The pseudotumor extends into mesenteric adipose tissue.   9/24/2021:  LIVER, CORE NEEDLE BIOPSY: FOCAL PORTAL TRIADITIS WITH SINUS DILATION.   - NO INTERFACE ACTIVITY.   - BRIDING FIBROSIS (FIBROSIS STAGE  F2-3)  - NO STEATOSIS.  Comment: Despite hepatitis testing demonstrating reactivity for hepatitis A in July 2021, no evidence of acute hepatitis is identified and LFTs are currently within normal limits. This case has also been reviewed in intradepartmental consultation.      CLINICAL HISTORY:       Patient: 82 year old female with multiple medical problems kindly referred for colon cancer.  She initially presented with anemia and fecal occult blood positive.  She also reports 20 pounds weight loss over the last previous 6 months. Colonoscopy on 3/15/2021 revealed a malignant partially obstructing tumor in the ascending  colon. Biopsy showed tubular adenoma with at least high-grade dysplasia.  On 3/17/2021 she underwent a CT scan of the abdomen and pelvis at [facility] that showed an irregular colonic mass measuring about 5 to 6 cm in length involving proximal to midportion of descending colon.  Enhancing soft tissue component contacts and may invade the right lateral abdominal wall.  No regional or distant lymphadenopathy identified.  Cirrhotic liver with portal hypertension, small volume ascites and splenomegaly.  No focal lesion identified in the liver.  Staging CT of the thorax 4/5/2021 with no thoracic metastatic disease identified.  Small volume ascites and borderline splenomegaly.No adenopathy or distant metastases.     Patient was seen by Dr. Robert Conner and she underwent laparoscopic/robotic right hemicolectomy on 4/22/2021.  Pathology report as follows:   RIGHT COLON, HEMICOLECTOMY: ADENOCARCINOMA, WELL DIFFERENTIATED, 90% MUCINOUS. 0/18 LN positive for malignanacy.See above for details.    Patient recovered from the surgery but came back to the emergency department for chest pain. She underwent CT angiogram that was negative for pulmonary embolism but lungs demonstrate mild heterogeneous attenuation with subtle peripheral reticular opacities.  Primary differential consideration is small airways disease.  Findings may be secondary to multifocal infiltrate from infection versus pulmonary edema.  It also showed moderate ascites with increase in size since prior examination.  Liver with nodular contour and was enlarged.  Spleen borderline enlarged.  1.3 x 1 cm enhancing nodule in the left adrenal gland no significant change.    She underwent ultrasound of the abdomen that confirmed the diagnosis of cirrhosis of the liver with hepatosplenomegaly and ascites.  She was discharged on 5/20/2021.    She stopped drinking at age 38, she says that she is a social drinker. She denies any family history of colon cancer. She  denies any fever, chills, or sweats. No chest pain or shortness of breath.    She had liver biopsy ordered by Dr. Louis on 9/24/21. It was ordered for cirrhosis. She has no history of hepatitis infection or alcohol abuse. Biopsy was negative for malignancy. Just  fibrosis.              Chief Complaint: 6 Month Follow Up (Scans Results.)        Interval History:  Patient presents today for follow up in surveillance for colon cancer to discuss CT scan results, accompanied by her daughter. She is doing well, remains active. She has no complaints today.  Denies pain, bleeding, fever, chills, sweats.  No chest pain or shortness of breath.    She continues to follow Dr. Louis, last colonoscopy 8/2023.      ROS: All 14 points ROS taken and as per Interval History  Review of Systems   Constitutional:  Positive for malaise/fatigue. Negative for chills, fever and weight loss.   HENT:  Negative for congestion and nosebleeds.    Eyes:  Negative for blurred vision, double vision and photophobia.   Respiratory:  Negative for cough, hemoptysis and shortness of breath.    Cardiovascular:  Negative for chest pain, palpitations, leg swelling and PND.   Gastrointestinal:  Negative for abdominal pain, blood in stool, constipation, diarrhea, melena, nausea and vomiting.   Genitourinary:  Negative for dysuria, frequency, hematuria and urgency.   Musculoskeletal:  Negative for back pain, falls and myalgias.   Skin:  Negative for itching and rash.   Neurological:  Negative for tremors, focal weakness, seizures, weakness and headaches.   Endo/Heme/Allergies:  Negative for environmental allergies. Does not bruise/bleed easily.   Psychiatric/Behavioral:  Negative for depression and suicidal ideas. The patient is not nervous/anxious.          Histories:  PMH/PSH/FH/SOCIAL/ALLERGIES AND MEDS REVIEWED AND UPDATED AS APPROPRIATE       Vitals:    03/20/24 1254   BP: 122/75   BP Location: Left arm   Patient Position: Sitting   Pulse: 81   Resp: 18  "  Temp: 98.1 °F (36.7 °C)   TempSrc: Oral   SpO2: 95%   Weight: 76.9 kg (169 lb 9.6 oz)   Height: 5' 4" (1.626 m)       Wt Readings from Last 6 Encounters:   03/20/24 76.9 kg (169 lb 9.6 oz)   01/02/24 77.7 kg (171 lb 4.8 oz)   08/30/23 79.8 kg (176 lb)   08/07/23 79.4 kg (175 lb)   07/28/23 80.7 kg (178 lb)   05/29/23 80.1 kg (176 lb 9.6 oz)     Body mass index is 29.11 kg/m².  Body surface area is 1.86 meters squared.       Physical Exam  Constitutional:       Appearance: Normal appearance. She is obese.   HENT:      Head: Normocephalic and atraumatic.      Nose: Nose normal.      Mouth/Throat:      Mouth: Mucous membranes are moist.      Pharynx: Oropharynx is clear.   Eyes:      Comments: Right eye subconjunctival hemorrhage   Cardiovascular:      Rate and Rhythm: Normal rate and regular rhythm.   Pulmonary:      Breath sounds: Normal breath sounds.   Abdominal:      Palpations: Abdomen is soft.   Musculoskeletal:         General: Normal range of motion.      Cervical back: Normal range of motion and neck supple.   Skin:     General: Skin is warm and dry.      Capillary Refill: Capillary refill takes less than 2 seconds.   Neurological:      General: No focal deficit present.      Mental Status: She is alert.   Psychiatric:         Mood and Affect: Mood normal.       ECOG SCORE    1 - Restricted in strenuous activity-ambulatory and able to carry out work of a light nature         Laboratory:  CBC with Differential:  Lab Results   Component Value Date    WBC 4.39 (L) 02/21/2024    RBC 4.52 02/21/2024    HGB 13.1 02/21/2024    HCT 40.1 02/21/2024    MCV 88.7 02/21/2024    MCH 29.0 02/21/2024    MCHC 32.7 (L) 02/21/2024    RDW 13.5 02/21/2024     (L) 02/21/2024    MPV 10.7 (H) 02/21/2024        CMP:  Sodium Level   Date Value Ref Range Status   02/21/2024 143 136 - 145 mmol/L Final     Potassium Level   Date Value Ref Range Status   02/21/2024 4.8 3.5 - 5.1 mmol/L Final     Carbon Dioxide   Date Value Ref " Range Status   02/21/2024 30 23 - 31 mmol/L Final     Blood Urea Nitrogen   Date Value Ref Range Status   02/21/2024 15.2 9.8 - 20.1 mg/dL Final     Creatinine   Date Value Ref Range Status   02/21/2024 0.77 0.55 - 1.02 mg/dL Final     Calcium Level Total   Date Value Ref Range Status   02/21/2024 9.9 8.4 - 10.2 mg/dL Final     Albumin Level   Date Value Ref Range Status   02/21/2024 4.0 3.4 - 4.8 g/dL Final     Bilirubin Total   Date Value Ref Range Status   02/21/2024 0.7 <=1.5 mg/dL Final     Alkaline Phosphatase   Date Value Ref Range Status   02/21/2024 51 40 - 150 unit/L Final     Aspartate Aminotransferase   Date Value Ref Range Status   02/21/2024 20 5 - 34 unit/L Final     Alanine Aminotransferase   Date Value Ref Range Status   02/21/2024 17 0 - 55 unit/L Final     Estimated GFR-Non    Date Value Ref Range Status   05/20/2022 >60 mls/min/1.73/m2 Final         Assessment:       1) pT2pN0M0--Stage I colon cancer--diagnosed in April 2021   NCCN guidelines (COL-3) for pathological stage T2, N0, M0   Surveillance (COL-8): colonoscopy in 1 year after surgery and then in 1 year for advanced adenoma, if no advanced adenoma then repeat in 3 yrs and then q 5 yrs.  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  These are benign lesions.  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 Will continue surveillance.  Colonoscopy due in 3/2024 with Dr. Louis - daughter plans to call office  CT C/A/P every 6 months x 5 years--due end of 8/2024, ordered  RTC in 6 month with MD, labs prior: CBC, CMP, CEA  Continue follow ups with other physicians    Encouraged to call for any questions or problems  The patient was given ample opportunity to ask questions and they were all answered to satisfaction; patient demonstrated  understanding of what we discussed and is agreeable to the plan.     Adriana Kapadia MD  Hematology/Oncology      Professional Services   I, Marian Bruno LPN, acted solely as a scribe for and in the presence of Dr. Adriana Kapadia, who performed these services.

## 2024-04-03 ENCOUNTER — TELEPHONE (OUTPATIENT)
Dept: FAMILY MEDICINE | Facility: CLINIC | Age: 84
End: 2024-04-03
Payer: MEDICARE

## 2024-05-20 DIAGNOSIS — E03.9 HYPOTHYROIDISM, UNSPECIFIED TYPE: ICD-10-CM

## 2024-05-20 DIAGNOSIS — E11.9 TYPE 2 DIABETES MELLITUS WITHOUT COMPLICATION, WITHOUT LONG-TERM CURRENT USE OF INSULIN: ICD-10-CM

## 2024-05-20 RX ORDER — LEVOTHYROXINE SODIUM 75 UG/1
75 TABLET ORAL DAILY
Qty: 90 TABLET | Refills: 1 | Status: SHIPPED | OUTPATIENT
Start: 2024-05-20

## 2024-05-20 RX ORDER — DULAGLUTIDE 0.75 MG/.5ML
0.75 INJECTION, SOLUTION SUBCUTANEOUS
Qty: 4 PEN | Refills: 1 | Status: SHIPPED | OUTPATIENT
Start: 2024-05-20

## 2024-05-20 NOTE — TELEPHONE ENCOUNTER
----- Message from Sveta Pierson sent at 5/20/2024 11:23 AM CDT -----  Regarding: med refill  .Who Called: Wendie Ferreira    Refill or New Rx:Refill  RX Name and Strength:levothyroxine (SYNTHROID) 75 MCG tablet   How is the patient currently taking it? (ex. 1XDay):1xday  Is this a 30 day or 90 day RX:90  Local or Mail Order:local  List of preferred pharmacies on file (remove unneeded): [facility]  If different Pharmacy is requested, enter Pharmacy information here including location and phone number:  [facility] #[id number] AT [address] [place] [address]      Ordering Provider:Allison      Preferred Method of Contact: Phone Call  Patient's Preferred Phone Number on File: [phone]   Best Call Back Number, if different:  Additional Information:   Call 911 for stroke/Need for follow up after discharge/Prescribed medications/Risk factors for stroke/Stroke education booklet/Stroke support groups for patients, families, and friends/Stroke warning signs and symptoms/Signs and symptoms of stroke

## 2024-05-20 NOTE — TELEPHONE ENCOUNTER
----- Message from Beth Love sent at 5/20/2024  8:11 AM CDT -----  Regarding: med refill  .Who Called: Wendie Ferreira    Refill or New Rx:Refill  RX Name and Strength:dulaglutide (TRULICITY) 0.75 mg/0.5 mL pen injector  How is the patient currently taking it? (ex. 1XDay):  Is this a 30 day or 90 day RX:  Local or Mail Order:local  List of preferred pharmacies on file (remove unneeded): [facility] #[id number] at [address] [place] [address]   Phone: [phone]  Fax: [phone]        Ordering Provider:Allison      Preferred Method of Contact: Phone Call  Patient's Preferred Phone Number on File: [phone]   Best Call Back Number, if different:  Additional Information: pt needs authorization from  to refill prescription

## 2024-07-02 NOTE — TELEPHONE ENCOUNTER
----- Message from Audrey Horn sent at 7/2/2024  1:46 PM CDT -----  Type:  RX Refill Request    Who Called: pt  Refill or New Rx:refill     RX Name and Strength: blood sugar diagnostic (TRUE METRIX GLUCOSE TEST STRIP) Strp  How is the patient currently taking it? (ex. 1XDay):  Is this a 30 day or 90 day RX:    Preferred Pharmacy with phone number:[facility][id number] AT [address] [place] [address]      Local or Mail Order:  Ordering Provider:  Would the patient rather a call back or a response via [facility]?   Best Call Back Number  Additional Information: pt completely out

## 2024-07-12 DIAGNOSIS — E11.9 TYPE 2 DIABETES MELLITUS WITHOUT COMPLICATION, WITHOUT LONG-TERM CURRENT USE OF INSULIN: ICD-10-CM

## 2024-07-12 RX ORDER — LANCETS 28 GAUGE
EACH MISCELLANEOUS
Qty: 100 EACH | Refills: 11 | Status: SHIPPED | OUTPATIENT
Start: 2024-07-12

## 2024-07-12 RX ORDER — ESCITALOPRAM OXALATE 10 MG/1
10 TABLET ORAL DAILY
Qty: 90 TABLET | Refills: 0 | Status: SHIPPED | OUTPATIENT
Start: 2024-07-12

## 2024-07-12 RX ORDER — DULAGLUTIDE 0.75 MG/.5ML
0.75 INJECTION, SOLUTION SUBCUTANEOUS
Qty: 4 PEN | Refills: 1 | Status: SHIPPED | OUTPATIENT
Start: 2024-07-12

## 2024-07-12 NOTE — TELEPHONE ENCOUNTER
----- Message from Latricia Mejias sent at 7/12/2024 10:40 AM CDT -----  .Type:  RX Refill Request    Who Called: pt   Refill or New Rx:refill   RX Name and Strength:EScitalopram oxalate (LEXAPRO) 10 MG tablet  How is the patient currently taking it? (ex. 1XDay):1x  Is this a 30 day or 90 day RX:90  Preferred Pharmacy with phone number:[id number] [place] - [address]  AT [facility]  Local or Mail Order:local   Ordering Provider:osmani   Would the patient rather a call back or a response via [facility]? Call back   Best Call Back Number:[phone]  Additional Information: no refill     .Type:  RX Refill Request    Who Called: pt   Refill or New Rx:refill   RX Name and Strength:TRUEPLUS LANCETS 28 gauge Misc  How is the patient currently taking it? (ex. 1XDay):  Is this a 30 day or 90 day RX:  Preferred Pharmacy with phone number:[id number] [place] - [address]  AT [facility]  Local or Mail Order:local   Ordering Provider:osmani   Would the patient rather a call back or a response via [facility]? Call back   Best Call Back Number:[phone]  Additional Information: refill     .Type:  RX Refill Request    Who Called: pt   Refill or New Rx:refill   RX Name and Strength:dulaglutide (TRULICITY) 0.75 mg/0.5 mL pen injector  How is the patient currently taking it? (ex. 1XDay):  Is this a 30 day or 90 day RX:  Preferred Pharmacy with phone number:[facility] #[id number] - [place] - [address] [facility]  Local or Mail Order:local   Ordering Provider:osmani   Would the patient rather a call back or a response via [facility]? Call back   Best Call Back Number:[phone]  Additional Information: no refills

## 2024-08-01 ENCOUNTER — TELEPHONE (OUTPATIENT)
Dept: FAMILY MEDICINE | Facility: CLINIC | Age: 84
End: 2024-08-01
Payer: MEDICARE

## 2024-08-01 NOTE — TELEPHONE ENCOUNTER
----- Message from Audrey Horn sent at 8/1/2024  8:48 AM CDT -----  Who Called: Wendie Ferreira    Caller is requesting assistance/information from provider's office.    Symptoms (please be specific):    How long has patient had these symptoms:    List of preferred pharmacies on file (remove unneeded): [unfilled]  If different, enter pharmacy into here including location and phone number:     Patient's Preferred Phone Number on File: [phone]   Best Call Back Number, if different:  Additional Information: would like labs faxed to [facility]  (pt doesn't have p# ) , would like a call to confirm orders have been faxed

## 2024-08-02 ENCOUNTER — TELEPHONE (OUTPATIENT)
Dept: FAMILY MEDICINE | Facility: CLINIC | Age: 84
End: 2024-08-02
Payer: MEDICARE

## 2024-08-02 LAB
CHOLEST SERPL-MSCNC: 155 MG/DL (ref 0–200)
HDLC SERPL-MCNC: 57 MG/DL (ref 35–70)
LDLC SERPL CALC-MCNC: 72 MG/DL (ref 0–160)
TRIGL SERPL-MCNC: 128 MG/DL (ref 40–160)

## 2024-08-02 NOTE — TELEPHONE ENCOUNTER
----- Message from Sveta Pierson sent at 8/2/2024  8:08 AM CDT -----  Regarding: Lab orders  -.Who Called: [facility]    Caller is requesting assistance/information from provider's office.    Symptoms (please be specific):    How long has patient had these symptoms:    List of preferred pharmacies on file (remove unneeded): [unfilled]  If different, enter pharmacy into here including location and phone number:       Preferred Method of Contact: Phone Call  Patient's Preferred Phone Number on File: [phone]  Best Call Back Number, if different:  Additional Information: Needs lab orders to faxed over to [phone]

## 2024-08-02 NOTE — TELEPHONE ENCOUNTER
Labs faxed. I called registration at [facility] to notify. The fax number that labs were originally sent to were for another campus. She gave me updated fax number for [facility] to keep on file

## 2024-08-05 ENCOUNTER — DOCUMENTATION ONLY (OUTPATIENT)
Dept: FAMILY MEDICINE | Facility: CLINIC | Age: 84
End: 2024-08-05
Payer: MEDICARE

## 2024-08-05 LAB — HBA1C MFR BLD: 5.8 %

## 2024-08-06 ENCOUNTER — DOCUMENTATION ONLY (OUTPATIENT)
Dept: FAMILY MEDICINE | Facility: CLINIC | Age: 84
End: 2024-08-06
Payer: MEDICARE

## 2024-08-07 ENCOUNTER — TELEPHONE (OUTPATIENT)
Dept: FAMILY MEDICINE | Facility: CLINIC | Age: 84
End: 2024-08-07

## 2024-08-07 ENCOUNTER — OFFICE VISIT (OUTPATIENT)
Dept: FAMILY MEDICINE | Facility: CLINIC | Age: 84
End: 2024-08-07
Payer: MEDICARE

## 2024-08-07 VITALS
HEART RATE: 60 BPM | HEIGHT: 64 IN | DIASTOLIC BLOOD PRESSURE: 72 MMHG | SYSTOLIC BLOOD PRESSURE: 128 MMHG | OXYGEN SATURATION: 98 % | BODY MASS INDEX: 28.77 KG/M2 | TEMPERATURE: 98 F | RESPIRATION RATE: 16 BRPM | WEIGHT: 168.5 LBS

## 2024-08-07 DIAGNOSIS — E11.59 HYPERTENSION ASSOCIATED WITH DIABETES: ICD-10-CM

## 2024-08-07 DIAGNOSIS — E55.9 VITAMIN D DEFICIENCY: ICD-10-CM

## 2024-08-07 DIAGNOSIS — E78.5 HYPERLIPIDEMIA ASSOCIATED WITH TYPE 2 DIABETES MELLITUS: ICD-10-CM

## 2024-08-07 DIAGNOSIS — C18.9 MALIGNANT NEOPLASM OF COLON, UNSPECIFIED PART OF COLON: ICD-10-CM

## 2024-08-07 DIAGNOSIS — Z12.31 BREAST CANCER SCREENING BY MAMMOGRAM: ICD-10-CM

## 2024-08-07 DIAGNOSIS — M85.80 OSTEOPENIA, UNSPECIFIED LOCATION: ICD-10-CM

## 2024-08-07 DIAGNOSIS — Z78.0 POSTMENOPAUSAL: ICD-10-CM

## 2024-08-07 DIAGNOSIS — E11.9 TYPE 2 DIABETES MELLITUS WITHOUT COMPLICATION, WITHOUT LONG-TERM CURRENT USE OF INSULIN: ICD-10-CM

## 2024-08-07 DIAGNOSIS — E03.9 HYPOTHYROIDISM, UNSPECIFIED TYPE: ICD-10-CM

## 2024-08-07 DIAGNOSIS — Z71.89 ADVANCED CARE PLANNING/COUNSELING DISCUSSION: ICD-10-CM

## 2024-08-07 DIAGNOSIS — I15.2 HYPERTENSION ASSOCIATED WITH DIABETES: ICD-10-CM

## 2024-08-07 DIAGNOSIS — F41.9 ANXIETY: ICD-10-CM

## 2024-08-07 DIAGNOSIS — I85.00 ESOPHAGEAL VARICES WITHOUT BLEEDING, UNSPECIFIED ESOPHAGEAL VARICES TYPE: ICD-10-CM

## 2024-08-07 DIAGNOSIS — Z00.00 MEDICARE ANNUAL WELLNESS VISIT, SUBSEQUENT: Primary | ICD-10-CM

## 2024-08-07 DIAGNOSIS — I25.10 ARTERIOSCLEROSIS OF CORONARY ARTERY: ICD-10-CM

## 2024-08-07 DIAGNOSIS — I25.118 ATHEROSCLEROTIC HEART DISEASE OF NATIVE CORONARY ARTERY WITH OTHER FORMS OF ANGINA PECTORIS: ICD-10-CM

## 2024-08-07 DIAGNOSIS — E11.69 HYPERLIPIDEMIA ASSOCIATED WITH TYPE 2 DIABETES MELLITUS: ICD-10-CM

## 2024-08-07 PROBLEM — E66.09 CLASS 1 OBESITY DUE TO EXCESS CALORIES WITH SERIOUS COMORBIDITY AND BODY MASS INDEX (BMI) OF 30.0 TO 30.9 IN ADULT: Status: RESOLVED | Noted: 2022-07-27 | Resolved: 2024-08-07

## 2024-08-07 PROBLEM — B33.8 RSV INFECTION: Status: RESOLVED | Noted: 2024-01-02 | Resolved: 2024-08-07

## 2024-08-07 PROBLEM — R19.7 DIARRHEA: Status: RESOLVED | Noted: 2024-01-02 | Resolved: 2024-08-07

## 2024-08-07 PROBLEM — E66.811 CLASS 1 OBESITY DUE TO EXCESS CALORIES WITH SERIOUS COMORBIDITY AND BODY MASS INDEX (BMI) OF 30.0 TO 30.9 IN ADULT: Status: RESOLVED | Noted: 2022-07-27 | Resolved: 2024-08-07

## 2024-08-07 PROCEDURE — G0439 PPPS, SUBSEQ VISIT: HCPCS | Mod: ,,, | Performed by: FAMILY MEDICINE

## 2024-08-07 RX ORDER — DULAGLUTIDE 0.75 MG/.5ML
0.75 INJECTION, SOLUTION SUBCUTANEOUS
Qty: 12 PEN | Refills: 3 | Status: SHIPPED | OUTPATIENT
Start: 2024-08-07 | End: 2025-08-07

## 2024-08-09 LAB
BCS RECOMMENDATION EXT: NORMAL
BMD RECOMMENDATION EXT: NORMAL

## 2024-08-12 ENCOUNTER — DOCUMENTATION ONLY (OUTPATIENT)
Dept: FAMILY MEDICINE | Facility: CLINIC | Age: 84
End: 2024-08-12
Payer: MEDICARE

## 2024-08-12 DIAGNOSIS — R92.8 ABNORMAL MAMMOGRAM: Primary | ICD-10-CM

## 2024-08-12 NOTE — PROGRESS NOTES
I have signed for the following orders AND/OR meds.  Please call the patient and ask the patient to schedule the testing AND/OR inform about any medications that were sent.      Orders Placed This Encounter   Procedures    Mammo Digital Diagnostic Right with Otis     Standing Status:   Future     Standing Expiration Date:   8/12/2026     Scheduling Instructions:      [facility]     Order Specific Question:   May the Radiologist modify the order per protocol to meet the clinical needs of the patient?     Answer:   Yes     Order Specific Question:   Release to patient     Answer:   Immediate    US Breast Right Complete     Standing Status:   Future     Standing Expiration Date:   8/12/2026     Scheduling Instructions:      [facility]     Order Specific Question:   May the Radiologist modify the order per protocol to meet the clinical needs of the patient?     Answer:   Yes     Order Specific Question:   Release to patient     Answer:   Immediate

## 2024-08-15 ENCOUNTER — DOCUMENTATION ONLY (OUTPATIENT)
Dept: FAMILY MEDICINE | Facility: CLINIC | Age: 84
End: 2024-08-15
Payer: MEDICARE

## 2024-08-15 NOTE — PROGRESS NOTES
Patient had abnormal MMG/US. Please ensure she has been scheduled for further evaluation by the breast center to prevent a delay in care.

## 2024-08-16 ENCOUNTER — TELEPHONE (OUTPATIENT)
Dept: FAMILY MEDICINE | Facility: CLINIC | Age: 84
End: 2024-08-16
Payer: MEDICARE

## 2024-08-16 DIAGNOSIS — R92.8 ABNORMAL MAMMOGRAM: Primary | ICD-10-CM

## 2024-08-16 NOTE — TELEPHONE ENCOUNTER
----- Message from Diana Donato sent at 8/16/2024 10:11 AM CDT -----  Regarding: orders  .Type:  Needs Medical Advice    Who Called: [facility]  Symptoms (please be specific):    How long has patient had these symptoms:    Pharmacy name and phone #:    Would the patient rather a call back or a response via [facility]?   Best Call Back Number:   Additional Information:  [phone] - [phone]  Additional Information: right breast ultra sound biopsy - R 92.8

## 2024-08-16 NOTE — TELEPHONE ENCOUNTER
I have signed for the following orders AND/OR meds.  Please call the patient and ask the patient to schedule the testing AND/OR inform about any medications that were sent.      Orders Placed This Encounter   Procedures    US Breast Biopsy with Imaging 1st site Right     Standing Status:   Future     Standing Expiration Date:   8/16/2026     Order Specific Question:   May the Radiologist modify the order per protocol to meet the clinical needs of the patient?     Answer:   Yes     Order Specific Question:   Release to patient     Answer:   Immediate

## 2024-08-21 ENCOUNTER — HOSPITAL ENCOUNTER (OUTPATIENT)
Dept: RADIOLOGY | Facility: HOSPITAL | Age: 84
Discharge: HOME | End: 2024-08-21
Attending: INTERNAL MEDICINE
Payer: MEDICARE

## 2024-08-21 DIAGNOSIS — C18.2 MALIGNANT NEOPLASM OF ASCENDING COLON: ICD-10-CM

## 2024-08-21 LAB
CREAT SERPL-MCNC: 0.8 MG/DL (ref 0.5–1.4)
SAMPLE: NORMAL

## 2024-08-21 PROCEDURE — 74177 CT ABD & PELVIS W/CONTRAST: CPT | Mod: TC

## 2024-08-21 PROCEDURE — 25500020 PHARM REV CODE 255: Performed by: INTERNAL MEDICINE

## 2024-08-21 PROCEDURE — 71260 CT THORAX DX C+: CPT | Mod: TC

## 2024-08-21 RX ADMIN — DIATRIZOATE MEGLUMINE AND DIATRIZOATE SODIUM 30 ML: 660; 100 LIQUID ORAL; RECTAL at 10:08

## 2024-08-21 RX ADMIN — IOHEXOL 100 ML: 350 INJECTION, SOLUTION INTRAVENOUS at 10:08

## 2024-08-26 LAB — BCS RECOMMENDATION EXT: NORMAL

## 2024-08-28 ENCOUNTER — DOCUMENTATION ONLY (OUTPATIENT)
Dept: FAMILY MEDICINE | Facility: CLINIC | Age: 84
End: 2024-08-28
Payer: MEDICARE

## 2024-08-28 ENCOUNTER — TELEPHONE (OUTPATIENT)
Dept: FAMILY MEDICINE | Facility: CLINIC | Age: 84
End: 2024-08-28
Payer: MEDICARE

## 2024-08-28 NOTE — TELEPHONE ENCOUNTER
----- Message from Nate Lubin sent at 8/28/2024 12:30 PM CDT -----  .Who Called: [facility]     Caller is requesting assistance/information from provider's office.    Symptoms (please be specific): n/a     How long has patient had these symptoms:  n/a    List of preferred pharmacies on file (remove unneeded): [unfilled]  If different, enter pharmacy into here including location and phone number: n/a    Preferred Method of Contact: Phone Call    Patient's Preferred Phone Number on File: [phone]     Best Call Back Number, if different: [phone] ([facility])    Additional Information: Wanting results om pt's breast biopsy. Please advise, thank you.

## 2024-08-28 NOTE — PROGRESS NOTES
Please inform patient of results.    1. I got a report stating she had a breast biopsy done 8/26/24 at [facility] but no results.  Have they called her with results?

## 2024-08-28 NOTE — TELEPHONE ENCOUNTER
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  I will place referral to dr. Pereyra office. She is already established with dr. Kapadia.  Has upcoming appt with her on 8/30/24.        I have signed for the following orders AND/OR meds.  Please call the patient and ask the patient to schedule the testing AND/OR inform about any medications that were sent.      Orders Placed This Encounter   Procedures    Ambulatory referral/consult to Breast Surgery     Standing Status:   Future     Standing Expiration Date:   9/28/2025     Referral Priority:   Urgent     Referral Reason:   Specialty Services Required     Requested Specialty:   Breast Surgery     Number of Visits Requested:   1

## 2024-08-30 ENCOUNTER — TELEPHONE (OUTPATIENT)
Dept: FAMILY MEDICINE | Facility: CLINIC | Age: 84
End: 2024-08-30
Payer: MEDICARE

## 2024-08-30 ENCOUNTER — OFFICE VISIT (OUTPATIENT)
Dept: HEMATOLOGY/ONCOLOGY | Facility: CLINIC | Age: 84
End: 2024-08-30
Payer: MEDICARE

## 2024-08-30 VITALS — BODY MASS INDEX: 28.49 KG/M2 | WEIGHT: 166 LBS

## 2024-08-30 DIAGNOSIS — C18.2 MALIGNANT NEOPLASM OF ASCENDING COLON: Primary | ICD-10-CM

## 2024-08-30 DIAGNOSIS — R16.1 SPLENOMEGALY: ICD-10-CM

## 2024-08-30 DIAGNOSIS — R92.8 ABNORMAL MAMMOGRAM: ICD-10-CM

## 2024-08-30 NOTE — TELEPHONE ENCOUNTER
Patient's daughter would like to know if you would be able to talk to her and review mrs montoya's biopsy results with her over the phone in better detail?

## 2024-08-30 NOTE — TELEPHONE ENCOUNTER
Spoke with jennifer. Informed biopsy shows breast cancer. Saw dr. Kapadia telemed today.  [facility] has appt with dr. Kapadia  on 9/12/24.

## 2024-08-30 NOTE — TELEPHONE ENCOUNTER
"Hi Dr. Kapadia,    I spoke to Mrs. Pressley's daughter, Felicitas, today.  She said that they had a telemed visit with you but her breast biopsy results from [facility] were not back at time of visit.  We received results. They are scanned under media folder dated 8/28/24 titled "pathology report".  I informed her it was breast cancer.  She said that their follow up appt was in 2 weeks so I wanted to make sure you had the results so ya'll could discuss next steps.      Let me know if you need anything else.     Thanks  Nicole Cooper MD      "

## 2024-08-30 NOTE — PROGRESS NOTES
HEMATOLOGY/ONCOLOGY OFFICE CLINIC VISIT    Visit Information:    This is a telemedicine note.  Patient was treated using telemedicine, real-time audio and video, according to [facility] protocols.      I, Dr. Adriana Kapadia, catalina provided, conducted the visit from location identified below.  The patient participated in the visit at a non-[facility] location selected by the patient (or patient's representative), identified below.  I am licensed in the [facility] where the patient stated they are located.  The patient, (or patient's representative) stated that they understood and accepted privacy and security risk to the information and her location.   I have reviewed the patient name and date of birth  I have verbally reviewed the past medical history, active medication list, and allergies with the patient (parent/ legal guardian for a patient under the age of 18 years old) and verified with picture ID if applicable . I have verified that this patient is a resident in the [facility].  I have verbally obtained review of systems    Patient was located in the [facility]  I, Dr. Adriana Kapadia, distant provider, was located at [facility] .    Face-to-face time spent with the patient exceeds 25 minutes, over 50% of which was used for education and counseling regarding medical conditions, current medications including risk/benefits and side effects/adverse events, over-the-counter medications uses/doses, home self-care and contact precautions, red flags and indication for immediate medical attention.  The patient is receptive, expresses understanding and is agreeable to the plan.  All questions answered.        Initial Evaluation: 5/26/2021  Referring Provider: Dr. Robert Conner  Other providers:  Code status:  Not addressed    Diagnosis:  T2N0M0-Stage I Colon cancer. Dx on 4/22/21    Present treatment:    Surveillance    Treatment/Oncology history:  3/15/2021:Colonoscopy: malignant partially obstructing  tumor in the ascending colon. Biopsy tubular adenoma with at least high-grade dysplasia.   4/22/2021: Laparoscopic/robotic right hemicolectomy     Plan of care: Surveillance      Imaging:  CT A/P 3/17/2021 at [facility]: irregular colonic mass 5 to 6 cm in length proximal to midportion of descending colon.  Enhancing soft tissue component contacts and may invade the right lateral abdominal wall.  No regional or distant lymphadenopathy identified.  Cirrhotic liver with portal hypertension, small volume ascites and splenomegaly.  No focal lesion identified in the liver.    CT of the thorax 4/5/2021: no thoracic metastatic disease identified.  Small volume ascites and borderline splenomegaly.No adenopathy or distant metastases.  CT angiogram 5/20/2021: negative for pulmonary embolism but lungs demonstrate mild heterogeneous attenuation with subtle peripheral reticular opacities.  Primary differential consideration is small airways disease.  Findings may be secondary to multifocal infiltrate from infection versus pulmonary edema.  It also showed moderate ascites with increase in size since prior examination.  Liver with nodular contour and was enlarged.  Spleen borderline enlarged.  A 1.3 x 1 cm enhancing nodule in the left adrenal gland no significant change.  Ultrasound of the abdomen 5/23/2021:cirrhosis of the liver with hepatosplenomegaly and ascites.   Ultrasound of the abdomen 10/26/2022: The pancreas appears grossly unremarkable.  No pancreatic mass or lesion is seen. liver is slightly enlarged in size. Liver is echogenic it measures 16 cm by my measurements..  No liver mass or lesion is seen. spleen appears enlarged and measures 14.5 cm.  Hepatomegaly with findings consistent with hepatic steatosis  CT C/A/P 2/16/2023:     1. No evidence of metastatic disease within the chest, abdomen and pelvis  2. Cirrhotic morphology of the liver  3. Changes of portal hypertension including borderline splenomegaly and  portosystemic collateral  4. Mild interstitial scarring within the lungs.  CT C/A/P  8/28/2023:  No new suspicious findings chest, abdomen or pelvis.  Chronic findings above.  CT C/A/P 2/21/2024: No detrimental change identified since 08/28/2023.   CT 8/22/2024: Similar mild chronic changes in the lungs with no suspicious pulmonary nodule.  Moderate coronary artery calcification.  No pleural or pericardial effusion.  No pathologically enlarged thoracic lymph node. Cirrhosis with mild hepatic steatosis.  Similar mild splenomegaly.  Stable pancreas, adrenal glands and kidneys.  Very small hiatal hernia.  Normal caliber small bowel and colon with right mid abdominal ileal colonic anastomosis.  Distal colonic diverticulosis.  Normal bladder.  No ascites or abdominal/pelvic lymphadenopathy.  Aortoiliac atherosclerosis. No aggressive osseous lesion.No metastatic disease identified.     Pathology:  4/22/2022:   RIGHT COLON, HEMICOLECTOMY: ADENOCARCINOMA, WELL DIFFERENTIATED, 90% MUCINOUS.   --  Greatest tumor dimension, 7.0 cm (as measured grossly).  --  Adenocarcinoma arises from tubular adenoma with high grade dysplasia.  --  Lymphovascular invasion, not identified.  --  Adenocarcinoma superficially invades muscularis propria.  --  Eighteen mesenteric lymph nodes, no neoplasm (0/18).  --  Surgical margins, uninvolved.  POSTOPERATIVE SPINDLE CELL PSEUDOTUMOR, 1.5 CM.  -  The pseudotumor extends into mesenteric adipose tissue.   9/24/2021:  LIVER, CORE NEEDLE BIOPSY: FOCAL PORTAL TRIADITIS WITH SINUS DILATION.   - NO INTERFACE ACTIVITY.   - BRIDING FIBROSIS (FIBROSIS STAGE  F2-3)  - NO STEATOSIS.  Comment: Despite hepatitis testing demonstrating reactivity for hepatitis A in July 2021, no evidence of acute hepatitis is identified and LFTs are currently within normal limits. This case has also been reviewed in intradepartmental consultation.      CLINICAL HISTORY:       Patient: 82 year old female with multiple medical  problems kindly referred for colon cancer.  She initially presented with anemia and fecal occult blood positive.  She also reports 20 pounds weight loss over the last previous 6 months. Colonoscopy on 3/15/2021 revealed a malignant partially obstructing tumor in the ascending colon. Biopsy showed tubular adenoma with at least high-grade dysplasia.  On 3/17/2021 she underwent a CT scan of the abdomen and pelvis at [facility] that showed an irregular colonic mass measuring about 5 to 6 cm in length involving proximal to midportion of descending colon.  Enhancing soft tissue component contacts and may invade the right lateral abdominal wall.  No regional or distant lymphadenopathy identified.  Cirrhotic liver with portal hypertension, small volume ascites and splenomegaly.  No focal lesion identified in the liver.  Staging CT of the thorax 4/5/2021 with no thoracic metastatic disease identified.  Small volume ascites and borderline splenomegaly.No adenopathy or distant metastases.     Patient was seen by Dr. Robert Conner and she underwent laparoscopic/robotic right hemicolectomy on 4/22/2021.  Pathology report as follows:   RIGHT COLON, HEMICOLECTOMY: ADENOCARCINOMA, WELL DIFFERENTIATED, 90% MUCINOUS. 0/18 LN positive for malignanacy.See above for details.    Patient recovered from the surgery but came back to the emergency department for chest pain. She underwent CT angiogram that was negative for pulmonary embolism but lungs demonstrate mild heterogeneous attenuation with subtle peripheral reticular opacities.  Primary differential consideration is small airways disease.  Findings may be secondary to multifocal infiltrate from infection versus pulmonary edema.  It also showed moderate ascites with increase in size since prior examination.  Liver with nodular contour and was enlarged.  Spleen borderline enlarged.  1.3 x 1 cm enhancing nodule in the left adrenal gland no significant change.    She underwent  ultrasound of the abdomen that confirmed the diagnosis of cirrhosis of the liver with hepatosplenomegaly and ascites.  She was discharged on 5/20/2021.    She stopped drinking at age 38, she says that she is a social drinker. She denies any family history of colon cancer. She denies any fever, chills, or sweats. No chest pain or shortness of breath.    She had liver biopsy ordered by Dr. Louis on 9/24/21. It was ordered for cirrhosis. She has no history of hepatitis infection or alcohol abuse. Biopsy was negative for malignancy. Just  fibrosis.              Chief Complaint: Results (CT SCAN Results.)        Interval History:  Patient presents today for follow up in surveillance for colon cancer to discuss CT scan results, accompanied by her daughter. She is doing well, remains active. She has no complaints today.  Denies pain, bleeding, fever, chills, sweats.  No chest pain or shortness of breath.    She continues to follow Dr. Louis, last colonoscopy 8/2023.    08/30/2024: Patient is here today via telemedicine to discuss CT scan results.  Daughter participated of the visit.  Patient is doing well and voices no concerns.  She denies any fever, chills, sweats.  No chest pain or short of breath.  No changes in bowel habits.  No bleeding.  Discussed in detail blood work results with her and CT scans that showed cirrhosis of the liver with mild splenomegaly otherwise no malignancy.    Of note patient had 08/09/2024 bilateral screening mammogram that was abnormal.  It reveal a right posterior breast focal asymmetry, the developed since the previous exam.  BI-RADS category 0: Additional imaging required On 08/15/2024 unilateral right breast diagnostic mammogram done in an outside facility revealed a right breast 1 o'clock position hypoechoic mass suspicious for malignancy.  Mass about 1 cm with irregular margins and shadowing.  Small right breast retroareolar dilated tox versus cysts are noted.  BI-RADS category 4:  Suspicious. On 08/26/2024 she underwent right breast mass ultrasound-guided biopsy and pathology pending.    ROS: All 14 points ROS taken and as per Interval History  Review of Systems   Constitutional:  Negative for chills, fever and weight loss.   HENT:  Negative for congestion and nosebleeds.    Eyes:  Negative for blurred vision, double vision and photophobia.   Respiratory:  Negative for cough, hemoptysis and shortness of breath.    Cardiovascular:  Negative for chest pain, palpitations, leg swelling and PND.   Gastrointestinal:  Positive for diarrhea (resolved). Negative for abdominal pain, blood in stool, constipation, melena, nausea and vomiting.   Genitourinary:  Negative for dysuria, frequency, hematuria and urgency.   Musculoskeletal:  Negative for back pain, falls and myalgias.   Skin:  Negative for itching and rash.   Neurological:  Negative for tremors, focal weakness, seizures, weakness and headaches.   Endo/Heme/Allergies:  Negative for environmental allergies. Does not bruise/bleed easily.   Psychiatric/Behavioral:  Negative for depression and suicidal ideas. The patient is nervous/anxious.    Answers submitted by the patient for this visit:  Review of Systems Questionnaire (Submitted on 8/29/2024)  appetite change : No  unexpected weight change: No  mouth sores: No  visual disturbance: No  adenopathy: No      Histories:  PMH/PSH/FH/SOCIAL/ALLERGIES AND MEDS REVIEWED AND UPDATED AS APPROPRIATE       Vitals:    08/30/24 1113   Weight: 75.3 kg (166 lb)       Wt Readings from Last 6 Encounters:   08/30/24 75.3 kg (166 lb)   08/07/24 76.4 kg (168 lb 8 oz)   03/20/24 76.9 kg (169 lb 9.6 oz)   01/02/24 77.7 kg (171 lb 4.8 oz)   08/30/23 79.8 kg (176 lb)   08/07/23 79.4 kg (175 lb)     Body mass index is 28.49 kg/m².  Body surface area is 1.84 meters squared.       Physical Exam  Vitals reviewed: LIMITED DUE TO TELEMEDICINE RESTRICTIONS..   Constitutional:       Appearance: Normal appearance.   HENT:       Head: Normocephalic.   Eyes:      Extraocular Movements: Extraocular movements intact.   Pulmonary:      Effort: Pulmonary effort is normal.   Musculoskeletal:      Cervical back: Neck supple.   Neurological:      Mental Status: She is alert.   Psychiatric:         Mood and Affect: Mood normal.         Behavior: Behavior normal.         Thought Content: Thought content normal.         Judgment: Judgment normal.       ECOG SCORE    1 - Restricted in strenuous activity-ambulatory and able to carry out work of a light nature         Laboratory:  CBC with Differential:  Lab Results   Component Value Date    WBC 5.96 08/21/2024    RBC 4.87 08/21/2024    HGB 14.1 08/21/2024    HCT 43.4 08/21/2024    MCV 89.1 08/21/2024    MCH 29.0 08/21/2024    MCHC 32.5 (L) 08/21/2024    RDW 14.0 08/21/2024     08/21/2024    MPV 10.7 (H) 08/21/2024        CMP:  Sodium   Date Value Ref Range Status   08/21/2024 144 136 - 145 mmol/L Final     Potassium   Date Value Ref Range Status   08/21/2024 5.3 (H) 3.5 - 5.1 mmol/L Final     Chloride   Date Value Ref Range Status   08/21/2024 105 98 - 107 mmol/L Final     CO2   Date Value Ref Range Status   08/21/2024 28 23 - 31 mmol/L Final     Blood Urea Nitrogen   Date Value Ref Range Status   08/21/2024 18.2 9.8 - 20.1 mg/dL Final     Creatinine   Date Value Ref Range Status   08/21/2024 0.81 0.55 - 1.02 mg/dL Final     Calcium   Date Value Ref Range Status   08/21/2024 10.5 (H) 8.4 - 10.2 mg/dL Final     Albumin   Date Value Ref Range Status   08/21/2024 4.3 3.4 - 4.8 g/dL Final     Bilirubin Total   Date Value Ref Range Status   08/21/2024 0.8 <=1.5 mg/dL Final     ALP   Date Value Ref Range Status   08/21/2024 51 40 - 150 unit/L Final     AST   Date Value Ref Range Status   08/21/2024 28 5 - 34 unit/L Final     ALT   Date Value Ref Range Status   08/21/2024 22 0 - 55 unit/L Final     Estimated GFR-Non    Date Value Ref Range Status   05/20/2022 >60 mls/min/1.73/m2 Final          Assessment:       1) pT2pN0M0--Stage I colon cancer--diagnosed in April 2021   3/15/2021:Colonoscopy: malignant partially obstructing tumor in the ascending colon. Biopsy tubular adenoma with at least high-grade dysplasia.    4/22/2021: Laparoscopic/robotic right hemicolectomy    Path: Adenocarcinoma superficially invades muscularis propria. Eighteen mesenteric lymph nodes, no neoplasm (0/18).     NCCN guidelines (COL-3) for pathological stage T2, N0, M0   Surveillance (COL-8): colonoscopy in 1 year after surgery and then in 1 year for advanced adenoma, if no advanced adenoma then repeat in 3 yrs and then q 5 yrs.   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  These are benign lesions.  3) Liver disease--Cirrhosis of the liver with Splenomegaly and ascites  4) abnormal mammogram--pathology from biopsy pending      Plan:       No clinical evidence of colon cancer recurrence.  CT C/A/P with [facility], stable benign findings. Patient with an abnormal mammogram for which she underwent biopsy and pathology is pending.          RTC in 2 weeks with MD to discuss pathology results of mammogram and significance of results and more definitive recommendations   Continue follow ups with other physicians    Encouraged to call for any questions or problems  The patient was given ample opportunity to ask questions and they were all answered to satisfaction; patient demonstrated understanding of what we discussed and is agreeable to the plan.     Adriana Kapadia MD  Hematology/Oncology

## 2024-08-30 NOTE — TELEPHONE ENCOUNTER
----- Message from Latricia Mejias sent at 8/30/2024 10:10 AM CDT -----  .Type:  Test Results    Who Called: pt's daughter jennifer   Name of Test (Lab/Mammo/Etc): biopsy on the breast   Date of Test: 8/26/24  Ordering Provider: chad   Where the test was performed: [facility]   Would the patient rather a call back or a response via [facility]? Call back   Best Call Back Number: [phone]  Additional Information:  Pt's daughter states mejia she will be taking with Dr. Kapadia in a little while about the pt. States that she wants to see what type of cancer etc

## 2024-09-05 ENCOUNTER — PATIENT MESSAGE (OUTPATIENT)
Dept: FAMILY MEDICINE | Facility: CLINIC | Age: 84
End: 2024-09-05
Payer: MEDICARE

## 2024-09-05 NOTE — TELEPHONE ENCOUNTER
Nola,  Can you cancel the referral to dr. Pereyra please?    Sue,   Please let ms. Polk know that we can refer her to dr. Robert duran if dr. Kapadia recommends surgery.  Or she may refer her to him if needed.

## 2024-09-12 ENCOUNTER — OFFICE VISIT (OUTPATIENT)
Dept: HEMATOLOGY/ONCOLOGY | Facility: CLINIC | Age: 84
End: 2024-09-12
Payer: MEDICARE

## 2024-09-12 VITALS
BODY MASS INDEX: 28.45 KG/M2 | WEIGHT: 166.63 LBS | HEART RATE: 75 BPM | TEMPERATURE: 98 F | RESPIRATION RATE: 18 BRPM | SYSTOLIC BLOOD PRESSURE: 135 MMHG | DIASTOLIC BLOOD PRESSURE: 80 MMHG | OXYGEN SATURATION: 97 % | HEIGHT: 64 IN

## 2024-09-12 DIAGNOSIS — D72.819 LEUKOPENIA, UNSPECIFIED TYPE: ICD-10-CM

## 2024-09-12 DIAGNOSIS — C50.111 MALIGNANT NEOPLASM OF CENTRAL PORTION OF RIGHT BREAST IN FEMALE, ESTROGEN RECEPTOR POSITIVE: Primary | ICD-10-CM

## 2024-09-12 DIAGNOSIS — R16.1 SPLENOMEGALY: ICD-10-CM

## 2024-09-12 DIAGNOSIS — Z17.0 MALIGNANT NEOPLASM OF CENTRAL PORTION OF RIGHT BREAST IN FEMALE, ESTROGEN RECEPTOR POSITIVE: Primary | ICD-10-CM

## 2024-09-12 DIAGNOSIS — C18.2 MALIGNANT NEOPLASM OF ASCENDING COLON: ICD-10-CM

## 2024-09-12 PROCEDURE — 99215 OFFICE O/P EST HI 40 MIN: CPT | Mod: PBBFAC | Performed by: INTERNAL MEDICINE

## 2024-09-12 PROCEDURE — 99999 PR PBB SHADOW E&M-EST. PATIENT-LVL V: CPT | Mod: PBBFAC,,, | Performed by: INTERNAL MEDICINE

## 2024-09-12 NOTE — PROGRESS NOTES
HEMATOLOGY/ONCOLOGY OFFICE CLINIC VISIT    Visit Information:    Initial Evaluation: 5/26/2021  Referring Provider: Dr. Robert Conner  Other providers:  Code status:  Not addressed    Diagnosis:  1) T2N0M0-Stage I Colon cancer. Dx on 4/22/21  2) Invasive lobular carcinoma right breast Dx 8/26//2024  ---ER 89.40%, TX 82.10%, HER2 equivocal, 2+, amplified by fish, Ki 67, high proliferation, 57.30%    Present treatment:    TBD    Treatment/Oncology history:  -03/15/2021: Colonoscopy: malignant partially obstructing tumor in the ascending colon. Biopsy tubular adenoma with at least high-grade dysplasia.   -04/22/2021: Laparoscopic/robotic right hemicolectomy   -08/15/2024: Abnormal right breast mammogram BI-RADS category 4, suspicious  -08/26/2024: Ultrasound-guided right breast biopsy at 1:00 a.m. position--> invasive lobular carcinoma      Imaging:  CT A/P 3/17/2021 at [facility]: irregular colonic mass 5 to 6 cm in length proximal to midportion of descending colon.  Enhancing soft tissue component contacts and may invade the right lateral abdominal wall.  No regional or distant lymphadenopathy identified.  Cirrhotic liver with portal hypertension, small volume ascites and splenomegaly.  No focal lesion identified in the liver.    CT of the thorax 4/5/2021: no thoracic metastatic disease identified.  Small volume ascites and borderline splenomegaly.No adenopathy or distant metastases.  CT angiogram 5/20/2021: negative for pulmonary embolism but lungs demonstrate mild heterogeneous attenuation with subtle peripheral reticular opacities.  Primary differential consideration is small airways disease.  Findings may be secondary to multifocal infiltrate from infection versus pulmonary edema.  It also showed moderate ascites with increase in size since prior examination.  Liver with nodular contour and was enlarged.  Spleen borderline enlarged.  A 1.3 x 1 cm enhancing nodule in the left adrenal gland no significant  change.  Ultrasound of the abdomen 5/23/2021:cirrhosis of the liver with hepatosplenomegaly and ascites.   Ultrasound of the abdomen 10/26/2022: The pancreas appears grossly unremarkable.  No pancreatic mass or lesion is seen. liver is slightly enlarged in size. Liver is echogenic it measures 16 cm by my measurements..  No liver mass or lesion is seen. spleen appears enlarged and measures 14.5 cm.  Hepatomegaly with findings consistent with hepatic steatosis  CT C/A/P 2/16/2023:     1. No evidence of metastatic disease within the chest, abdomen and pelvis  2. Cirrhotic morphology of the liver  3. Changes of portal hypertension including borderline splenomegaly and portosystemic collateral  4. Mild interstitial scarring within the lungs.  CT C/A/P  8/28/2023:  No new suspicious findings chest, abdomen or pelvis.  Chronic findings above.  CT C/A/P 2/21/2024: No detrimental change identified since 08/28/2023.   CT 8/22/2024: Similar mild chronic changes in the lungs with no suspicious pulmonary nodule.  Moderate coronary artery calcification.  No pleural or pericardial effusion.  No pathologically enlarged thoracic lymph node. Cirrhosis with mild hepatic steatosis.  Similar mild splenomegaly.  Stable pancreas, adrenal glands and kidneys.  Very small hiatal hernia.  Normal caliber small bowel and colon with right mid abdominal ileal colonic anastomosis.  Distal colonic diverticulosis.  Normal bladder.  No ascites or abdominal/pelvic lymphadenopathy.  Aortoiliac atherosclerosis. No aggressive osseous lesion.No metastatic disease identified.     Pathology:  4/22/2022:   RIGHT COLON, HEMICOLECTOMY: ADENOCARCINOMA, WELL DIFFERENTIATED, 90% MUCINOUS.   --  Greatest tumor dimension, 7.0 cm (as measured grossly).  --  Adenocarcinoma arises from tubular adenoma with high grade dysplasia.  --  Lymphovascular invasion, not identified.  --  Adenocarcinoma superficially invades muscularis propria.  --  Eighteen mesenteric lymph  nodes, no neoplasm (0/18).  --  Surgical margins, uninvolved.  POSTOPERATIVE SPINDLE CELL PSEUDOTUMOR, 1.5 CM.  -  The pseudotumor extends into mesenteric adipose tissue.   9/24/2021:  LIVER, CORE NEEDLE BIOPSY: FOCAL PORTAL TRIADITIS WITH SINUS DILATION.   - NO INTERFACE ACTIVITY.   - BRIDING FIBROSIS (FIBROSIS STAGE  F2-3)  - NO STEATOSIS.  Comment: Despite hepatitis testing demonstrating reactivity for hepatitis A in July 2021, no evidence of acute hepatitis is identified and LFTs are currently within normal limits. This case has also been reviewed in intradepartmental consultation.  8/26/2024:  RIGHT BREAST, 1 O'CLOCK MASS ULTRASOUND GUIDED VACUUM BIOPSY:  - Invasive carcinoma with lobular features, Xavier Grade 3, see comment.  - Carcinoma involving 3 of 4 cores, 7 mm in longest linear extent.    ER 89.40%, HI 82.10%, HER2 equivocal, 2+, amplified by fish, Ki 67, high proliferation, 57.30%      CLINICAL HISTORY:       Patient: 82 year old female with multiple medical problems kindly referred for colon cancer.  She initially presented with anemia and fecal occult blood positive.  She also reports 20 pounds weight loss over the last previous 6 months. Colonoscopy on 3/15/2021 revealed a malignant partially obstructing tumor in the ascending colon. Biopsy showed tubular adenoma with at least high-grade dysplasia.  On 3/17/2021 she underwent a CT scan of the abdomen and pelvis at [facility] that showed an irregular colonic mass measuring about 5 to 6 cm in length involving proximal to midportion of descending colon.  Enhancing soft tissue component contacts and may invade the right lateral abdominal wall.  No regional or distant lymphadenopathy identified.  Cirrhotic liver with portal hypertension, small volume ascites and splenomegaly.  No focal lesion identified in the liver.  Staging CT of the thorax 4/5/2021 with no thoracic metastatic disease identified.  Small volume ascites and borderline  splenomegaly.No adenopathy or distant metastases.     Patient was seen by Dr. Robert Conner and she underwent laparoscopic/robotic right hemicolectomy on 4/22/2021.  Pathology report as follows:   RIGHT COLON, HEMICOLECTOMY: ADENOCARCINOMA, WELL DIFFERENTIATED, 90% MUCINOUS. 0/18 LN positive for malignanacy.See above for details.    Patient recovered from the surgery but came back to the emergency department for chest pain. She underwent CT angiogram that was negative for pulmonary embolism but lungs demonstrate mild heterogeneous attenuation with subtle peripheral reticular opacities.  Primary differential consideration is small airways disease.  Findings may be secondary to multifocal infiltrate from infection versus pulmonary edema.  It also showed moderate ascites with increase in size since prior examination.  Liver with nodular contour and was enlarged.  Spleen borderline enlarged.  1.3 x 1 cm enhancing nodule in the left adrenal gland no significant change.    She underwent ultrasound of the abdomen that confirmed the diagnosis of cirrhosis of the liver with hepatosplenomegaly and ascites.  She was discharged on 5/20/2021.    She stopped drinking at age 38, she says that she is a social drinker. She denies any family history of colon cancer. She denies any fever, chills, or sweats. No chest pain or shortness of breath.    She had liver biopsy ordered by Dr. Louis on 9/24/21. It was ordered for cirrhosis. She has no history of hepatitis infection or alcohol abuse. Biopsy was negative for malignancy. Just  fibrosis.              Chief Complaint: Follow-up        Interval History:  Patient presents today for follow up in surveillance for colon cancer to discuss CT scan results, accompanied by her daughter. She is doing well, remains active. She has no complaints today.  Denies pain, bleeding, fever, chills, sweats.  No chest pain or shortness of breath.    She continues to follow Dr. Louis, last colonoscopy  8/2023.    08/30/2024: Patient is here today via telemedicine to discuss CT scan results.  Daughter participated of the visit.  Patient is doing well and voices no concerns.  She denies any fever, chills, sweats.  No chest pain or short of breath.  No changes in bowel habits.  No bleeding.  Discussed in detail blood work results with her and CT scans that showed cirrhosis of the liver with mild splenomegaly otherwise no malignancy.    Off note patient had 08/09/2024 bilateral screening mammogram that was abnormal.  It reveal a right posterior breast focal asymmetry, the developed since the previous exam.  BI-RADS category 0: Additional imaging required On 08/15/2024 unilateral right breast diagnostic mammogram done in an outside facility revealed a right breast 1 o'clock position hypoechoic mass suspicious for malignancy.  Mass about 1 cm with irregular margins and shadowing.  Small right breast retroareolar dilated tox versus cysts are noted.  BI-RADS category 4: Suspicious. On 08/26/2024 she underwent right breast mass ultrasound-guided biopsy and pathology pending.    9/12/2024: Patient here today with her daughter to discuss breast biopsy results. She is doing well and has no complaints today. Biopsy results and recommendations discussed with them, all questions were answered.    ROS: All 14 points ROS taken and as per Interval History  Review of Systems   Respiratory:  Negative for cough and shortness of breath.    Cardiovascular:  Negative for chest pain.   Gastrointestinal:  Positive for diarrhea. Negative for abdominal pain.   Genitourinary:  Positive for frequency.   Musculoskeletal:  Negative for back pain.   Skin:  Negative for rash.   Neurological:  Negative for headaches.   Psychiatric/Behavioral:  The patient is nervous/anxious.    Answers submitted by the patient for this visit:  Review of Systems Questionnaire (Submitted on 8/29/2024)  appetite change : No  unexpected weight change: No  mouth sores:  "No  visual disturbance: No  adenopathy: No      Histories:  PMH/PSH/FH/SOCIAL/ALLERGIES AND MEDS REVIEWED AND UPDATED AS APPROPRIATE       Vitals:    09/12/24 1042   BP: 135/80   BP Location: Left arm   Patient Position: Sitting   Pulse: 75   Resp: 18   Temp: 97.8 °F (36.6 °C)   TempSrc: Oral   SpO2: 97%   Weight: 75.6 kg (166 lb 9.6 oz)   Height: 5' 4" (1.626 m)         Wt Readings from Last 6 Encounters:   09/12/24 75.6 kg (166 lb 9.6 oz)   08/30/24 75.3 kg (166 lb)   08/07/24 76.4 kg (168 lb 8 oz)   03/20/24 76.9 kg (169 lb 9.6 oz)   01/02/24 77.7 kg (171 lb 4.8 oz)   08/30/23 79.8 kg (176 lb)     Body mass index is 28.6 kg/m².  Body surface area is 1.85 meters squared.       Physical Exam  Constitutional:       Appearance: Normal appearance.   HENT:      Head: Normocephalic and atraumatic.   Eyes:      General: No scleral icterus.     Extraocular Movements: Extraocular movements intact.      Conjunctiva/sclera: Conjunctivae normal.   Neck:      Vascular: No JVD.   Cardiovascular:      Rate and Rhythm: Normal rate and regular rhythm.      Heart sounds: No murmur heard.  Pulmonary:      Effort: Pulmonary effort is normal.      Breath sounds: Normal breath sounds. No wheezing or rhonchi.   Chest:   Breasts:     Right: Normal. No swelling, mass, nipple discharge, skin change or tenderness.      Left: Normal. No swelling, mass, nipple discharge, skin change or tenderness.      Comments: Biopsy site with very small resolving hematoma  Abdominal:      General: Bowel sounds are normal. There is no distension.      Palpations: Abdomen is soft. There is no mass.      Tenderness: There is no abdominal tenderness.   Musculoskeletal:      Cervical back: Neck supple.   Lymphadenopathy:      Head:      Right side of head: No submental or submandibular adenopathy.      Left side of head: No submental or submandibular adenopathy.      Cervical: No cervical adenopathy.      Upper Body:      Right upper body: No supraclavicular or " axillary adenopathy.      Left upper body: No supraclavicular or axillary adenopathy.      Lower Body: No right inguinal adenopathy. No left inguinal adenopathy.   Skin:     General: Skin is warm.      Coloration: Skin is not jaundiced.      Findings: No lesion or rash.      Nails: There is no clubbing.   Neurological:      Mental Status: She is alert and oriented to person, place, and time.      Cranial Nerves: Cranial nerves 2-12 are intact.   Psychiatric:         Attention and Perception: Attention normal.         Behavior: Behavior is cooperative.         Judgment: Judgment normal.       ECOG SCORE    1 - Restricted in strenuous activity-ambulatory and able to carry out work of a light nature         Laboratory:  CBC with Differential:  Lab Results   Component Value Date    WBC 5.96 08/21/2024    RBC 4.87 08/21/2024    HGB 14.1 08/21/2024    HCT 43.4 08/21/2024    MCV 89.1 08/21/2024    MCH 29.0 08/21/2024    MCHC 32.5 (L) 08/21/2024    RDW 14.0 08/21/2024     08/21/2024    MPV 10.7 (H) 08/21/2024        CMP:  Sodium   Date Value Ref Range Status   08/21/2024 144 136 - 145 mmol/L Final     Potassium   Date Value Ref Range Status   08/21/2024 5.3 (H) 3.5 - 5.1 mmol/L Final     Chloride   Date Value Ref Range Status   08/21/2024 105 98 - 107 mmol/L Final     CO2   Date Value Ref Range Status   08/21/2024 28 23 - 31 mmol/L Final     Blood Urea Nitrogen   Date Value Ref Range Status   08/21/2024 18.2 9.8 - 20.1 mg/dL Final     Creatinine   Date Value Ref Range Status   08/21/2024 0.81 0.55 - 1.02 mg/dL Final     Calcium   Date Value Ref Range Status   08/21/2024 10.5 (H) 8.4 - 10.2 mg/dL Final     Albumin   Date Value Ref Range Status   08/21/2024 4.3 3.4 - 4.8 g/dL Final     Bilirubin Total   Date Value Ref Range Status   08/21/2024 0.8 <=1.5 mg/dL Final     ALP   Date Value Ref Range Status   08/21/2024 51 40 - 150 unit/L Final     AST   Date Value Ref Range Status   08/21/2024 28 5 - 34 unit/L Final      ALT   Date Value Ref Range Status   08/21/2024 22 0 - 55 unit/L Final     Estimated GFR-Non    Date Value Ref Range Status   05/20/2022 >60 mls/min/1.73/m2 Final         Assessment:       1. Malignant neoplasm of central portion of right breast in female, estrogen receptor positive    2. Malignant neoplasm of ascending colon    3. Splenomegaly    4. Leukopenia, unspecified type        1) pT2pN0M0--Stage I colon cancer--diagnosed in April 2021   -3/15/2021:Colonoscopy: malignant partially obstructing tumor in the ascending colon. Biopsy tubular adenoma with at least high-grade dysplasia.    -4/22/2021: Laparoscopic/robotic right hemicolectomy    -Path: Adenocarcinoma superficially invades muscularis propria. Eighteen mesenteric lymph nodes, no neoplasm (0/18).     NCCN guidelines (COL-3) for pathological stage T2, N0, M0   Surveillance (COL-8): colonoscopy in 1 year after surgery and then in 1 year for advanced adenoma, if no advanced adenoma then repeat in 3 yrs and then q 5 yrs.   CT C/A/P 8/22/2024 with [facility], stable benign findings    2) Invasive lobular carcinoma right breast Dx 8/26//2024  ---ER 89.40%, [place] 82.10%, HER2 equivocal, 2+, amplified by fish, Ki 67, high proliferation, 57.30%   Discussed briefly diagnosis, prognosis and treatment recommendations according to NCCN guidelines.  We will refer to Dr. Robert Conner for surgical evaluation.    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  These are benign lesions.    4) Liver disease--Cirrhosis of the liver with Splenomegaly and ascites        Plan:       No clinical evidence of colon cancer recurrence.  CT C/A/P with [facility], stable benign findings. Patient with an abnormal mammogram for which she underwent right breast biopsy and pathology positive for Invasive lobular carcinoma.    Refer to Dr. Conner for surgical evaluation  RTC in  2 weeks with MD after surgical evaluation with Dr. Conner   Labs: CBC, CMP, CA 27-29  Continue follow ups with other physicians    Encouraged to call for any questions or problems  The patient was given ample opportunity to ask questions and they were all answered to satisfaction; patient demonstrated understanding of what we discussed and is agreeable to the plan.     Adriana Kapadia MD  Hematology/Oncology      Professional Services   I, Marian Bruno LPN, acted solely as a scribe for and in the presence of Dr. Adriana Kapadia, who performed these services.

## 2024-09-19 NOTE — PROGRESS NOTES
"History & Physical      CHIEF COMPLAINT:  RIGHT BREAST CANCER    History of Present Illness:  84 year-old-female previously known to me for colon cancer who underwent a right hemicolectomy in June 2021.  Patient presented for routine screening mammogram which showed a right posterior breast focal asymmetry.  Diagnostic mammogram and ultrasound of the right breast showed a right breast 1 cm hypoechoic mass at 1 o'clock.  No mention of suspicious adenopathy.  Biopsy performed, pathology revealed invasive carcinoma; ER positive, IA positive, HER2 positive.  She is followed by Dr. Kapadia with medical oncology.  No family history of breast cancer    64 minutes was required for complete chart review, imaging review including interpretation of imaging, coordination with referring/other physicians, patient counseling regarding diagnosis/treatment plan, answering questions, medical decision making, and documentation.    Visit today included increased complexity associated with the care of the episodic problem breast cancer addressed and managing the longitudinal care of the patient due to the serious and/or complex managed problem(s) .  Review of patient's allergies indicates:   Allergen Reactions    Adhesive      Other reaction(s): Skin tear  adhesive tape    Codeine      "Feels like I'm having a heart attack"    Milk      Other reaction(s): GAS       Current Outpatient Medications   Medication Sig Dispense Refill    albuterol-ipratropium (DUO-NEB) 2.5 mg-0.5 mg/3 mL nebulizer solution Take 3 mLs by nebulization every 6 (six) hours as needed for Wheezing. Rescue 75 mL 0    BD STEPHAN 2ND GEN PEN NEEDLE 32 gauge x 5/32" Ndle USE AS DIRECTED ONCE DAILY      benzonatate (TESSALON) 100 MG capsule Take 1 capsule (100 mg total) by mouth 3 (three) times daily as needed for Cough. 30 capsule 1    blood sugar diagnostic (TRUE METRIX GLUCOSE TEST STRIP) Strp Inject 1 each into the skin Daily. 200 strip 11    cholecalciferol, vitamin " D3, (VITAMIN D3) 50 mcg (2,000 unit) Tab Take 2,000 Int'l Units by mouth.      dulaglutide (TRULICITY) 0.75 mg/0.5 mL pen injector Inject 0.75 mg into the skin every 7 days. 12 pen 3    EScitalopram oxalate (LEXAPRO) 10 MG tablet Take 1 tablet (10 mg total) by mouth once daily. 90 tablet 0    famotidine (PEPCID) 40 MG tablet Take 40 mg by mouth every evening.      lancets (TRUEPLUS LANCETS) 28 gauge Misc To test blood sugar daily 100 each 11    levothyroxine (SYNTHROID) 75 MCG tablet Take 1 tablet (75 mcg total) by mouth once daily. 90 tablet 1    metFORMIN (GLUCOPHAGE) 1000 MG tablet Take 1 tablet (1,000 mg total) by mouth 2 (two) times daily with meals. 180 tablet 3    nitroGLYCERIN (NITROSTAT) 0.4 MG SL tablet Place 0.4 mg under the tongue.      pravastatin (PRAVACHOL) 20 MG tablet Take 1 tablet (20 mg total) by mouth every evening. 90 tablet 3    spironolactone (ALDACTONE) 25 MG tablet Take 0.5 tablets (12.5 mg total) by mouth once daily. 90 tablet 3    UNABLE TO FIND 1 lancet once daily.      UNABLE TO FIND   BD pen needle shrt [id number] (5/16) ROMA, See Instructions, use daily to inject insulin. dx. e11.9, # 100 EA, 11 Refill(s), Pharmacy: [id number], 164, cm, Height/Length Dosing, 06/02/21 21:58:00 CDT, 73.2, kg, Weight Dosing, 06/02/21 21:...       No current facility-administered medications for this visit.       Past Medical History:   Diagnosis Date    Acid reflux     Colon cancer     Heart attack      Past Surgical History:   Procedure Laterality Date    CARPAL TUNNEL RELEASE      CATARACT EXTRACTION Right 09/2014    CHOLECYSTECTOMY      COLECTOMY Right 04/22/2021    Ascending    CORONARY STENT PLACEMENT      EGD, WITH CLOSED BIOPSY  8/7/2023    Procedure: EGD, WITH CLOSED BIOPSY;  Surgeon: Rich Louis MD;  Location: [facility] ENDOSCOPY;  Service: Gastroenterology;;    ESOPHAGOGASTRODUODENOSCOPY N/A 8/7/2023    Procedure: EGD;  Surgeon: Rich Louis MD;  Location: [facility]  ENDOSCOPY;  Service: Gastroenterology;  Laterality: N/A;    EYE SURGERY  2014    Right eye Cataracts    SMALL INTESTINE SURGERY  April 2021    Colon cancer 12 colon resection    TUBAL LIGATION  1980????     Family History   Problem Relation Name Age of Onset    Hypertension Mother Carly     Heart failure Mother Carly     Diabetes Father Anthony     Hypertension Sister Lanny      Social History     Tobacco Use    Smoking status: Former     Types: Cigarettes    Smokeless tobacco: Never   Substance Use Topics    Alcohol use: Not Currently    Drug use: Never        Review of Systems:  Review of Systems   Constitutional:  Negative for appetite change, chills, diaphoresis and fever.   HENT:  Negative for congestion, drooling, ear discharge, ear pain and hearing loss.    Eyes:  Negative for discharge.   Respiratory:  Negative for apnea, cough, choking, chest tightness, shortness of breath and stridor.    Cardiovascular:  Negative for chest pain, palpitations and leg swelling.   Endocrine: Negative for cold intolerance and heat intolerance.   Genitourinary:  Negative for difficulty urinating, dyspareunia, dysuria and hematuria.   Musculoskeletal:  Negative for arthralgias, gait problem and joint swelling.   Skin:  Negative for color change and rash.   Neurological:  Negative for dizziness, tremors, seizures, syncope, facial asymmetry, speech difficulty, light-headedness, numbness and headaches.   Psychiatric/Behavioral:  Negative for agitation and confusion.           Objective     Vital Signs (Most Recent)              Physical Exam:  Physical Exam  Vitals and nursing note reviewed. Exam conducted with a chaperone present.   Constitutional:       General: She is not in acute distress.     Appearance: Normal appearance. She is not ill-appearing, toxic-appearing or diaphoretic.   HENT:      Head: Normocephalic and atraumatic.      Nose: Nose normal.      Mouth/Throat:      Mouth: Mucous membranes are moist.      Pharynx:  Oropharynx is clear.   Eyes:      General: No scleral icterus.     Extraocular Movements: Extraocular movements intact.      Pupils: Pupils are equal, round, and reactive to light.   Neck:      Vascular: No carotid bruit.   Cardiovascular:      Rate and Rhythm: Normal rate and regular rhythm.      Pulses: Normal pulses.      Heart sounds: Normal heart sounds. No murmur heard.  Pulmonary:      Effort: Pulmonary effort is normal.      Breath sounds: Normal breath sounds. No stridor. No wheezing.   Chest:   Breasts:     Right: Normal. No swelling, bleeding, inverted nipple, mass, nipple discharge, skin change or tenderness.      Left: Normal. No swelling, bleeding, inverted nipple, mass, nipple discharge, skin change or tenderness.   Abdominal:      General: Abdomen is flat. Bowel sounds are normal. There is no distension.      Palpations: Abdomen is soft. There is no mass.      Tenderness: There is no abdominal tenderness. There is no right CVA tenderness, left CVA tenderness, guarding or rebound.      Hernia: No hernia is present.   Musculoskeletal:         General: No swelling, tenderness, deformity or signs of injury. Normal range of motion.      Cervical back: Normal range of motion and neck supple. No rigidity or tenderness.      Right lower leg: No edema.      Left lower leg: No edema.   Lymphadenopathy:      Cervical: No cervical adenopathy.      Upper Body:      Right upper body: No supraclavicular or axillary adenopathy.      Left upper body: No supraclavicular or axillary adenopathy.   Skin:     General: Skin is warm.      Capillary Refill: Capillary refill takes less than 2 seconds.      Coloration: Skin is not jaundiced or pale.      Findings: No bruising, erythema, lesion or rash.   Neurological:      General: No focal deficit present.      Mental Status: She is alert and oriented to person, place, and time.      Sensory: No sensory deficit.      Motor: No weakness.      Coordination: Coordination normal.       Gait: Gait normal.   Psychiatric:         Mood and Affect: Mood normal.         Behavior: Behavior normal.         Judgment: Judgment normal.            Assessment and Plan     Right breast 1 cm invasive ductal carcinoma, ER/HI positive, HER2 positive.    Diagnosis, staging, prognosis and treatment guidelines for breast cancer were discussed with the patient in detail, all questions were addressed.Using visual aids and drawings, I described the anatomy and potential surgical procedures.  Based on imaging she has a candidate for breast conservation therapy and she is interested in this approach.    Preoperative cardiac evaluation    Preoperative Mag seed placement    Schedule right breast ultrasound-guided lumpectomy, sentinel lymph node mapping and biopsy    The risks and benefits of the procedure were explained in detail using layman terms, including the pros and cons of any implant that may be used, all questions were addressed, the patient gives consent to proceed

## 2024-09-20 ENCOUNTER — LAB VISIT (OUTPATIENT)
Dept: LAB | Facility: HOSPITAL | Age: 84
End: 2024-09-20
Attending: INTERNAL MEDICINE
Payer: MEDICARE

## 2024-09-20 ENCOUNTER — OFFICE VISIT (OUTPATIENT)
Dept: SURGICAL ONCOLOGY | Facility: CLINIC | Age: 84
End: 2024-09-20
Payer: MEDICARE

## 2024-09-20 VITALS
DIASTOLIC BLOOD PRESSURE: 79 MMHG | OXYGEN SATURATION: 99 % | SYSTOLIC BLOOD PRESSURE: 145 MMHG | HEIGHT: 64 IN | WEIGHT: 169.38 LBS | HEART RATE: 95 BPM | RESPIRATION RATE: 20 BRPM | BODY MASS INDEX: 28.92 KG/M2

## 2024-09-20 DIAGNOSIS — R16.1 SPLENOMEGALY: ICD-10-CM

## 2024-09-20 DIAGNOSIS — C50.111 MALIGNANT NEOPLASM OF CENTRAL PORTION OF RIGHT BREAST IN FEMALE, ESTROGEN RECEPTOR POSITIVE: ICD-10-CM

## 2024-09-20 DIAGNOSIS — Z17.0 MALIGNANT NEOPLASM OF CENTRAL PORTION OF RIGHT BREAST IN FEMALE, ESTROGEN RECEPTOR POSITIVE: ICD-10-CM

## 2024-09-20 DIAGNOSIS — C18.2 MALIGNANT NEOPLASM OF ASCENDING COLON: ICD-10-CM

## 2024-09-20 DIAGNOSIS — D72.819 LEUKOPENIA, UNSPECIFIED TYPE: ICD-10-CM

## 2024-09-20 LAB
ALBUMIN SERPL-MCNC: 4 G/DL (ref 3.4–4.8)
ALBUMIN/GLOB SERPL: 1.4 RATIO (ref 1.1–2)
ALP SERPL-CCNC: 47 UNIT/L (ref 40–150)
ALT SERPL-CCNC: 17 UNIT/L (ref 0–55)
ANION GAP SERPL CALC-SCNC: 10 MEQ/L
AST SERPL-CCNC: 20 UNIT/L (ref 5–34)
BASOPHILS # BLD AUTO: 0.02 X10(3)/MCL
BASOPHILS NFR BLD AUTO: 0.4 %
BILIRUB SERPL-MCNC: 0.7 MG/DL
BUN SERPL-MCNC: 17.9 MG/DL (ref 9.8–20.1)
CALCIUM SERPL-MCNC: 10.5 MG/DL (ref 8.4–10.2)
CHLORIDE SERPL-SCNC: 105 MMOL/L (ref 98–107)
CO2 SERPL-SCNC: 29 MMOL/L (ref 23–31)
CREAT SERPL-MCNC: 0.8 MG/DL (ref 0.55–1.02)
CREAT/UREA NIT SERPL: 22
EOSINOPHIL # BLD AUTO: 0.15 X10(3)/MCL (ref 0–0.9)
EOSINOPHIL NFR BLD AUTO: 3.2 %
ERYTHROCYTE [DISTWIDTH] IN BLOOD BY AUTOMATED COUNT: 13.4 % (ref 11.5–17)
GFR SERPLBLD CREATININE-BSD FMLA CKD-EPI: >60 ML/MIN/1.73/M2
GLOBULIN SER-MCNC: 2.8 GM/DL (ref 2.4–3.5)
GLUCOSE SERPL-MCNC: 97 MG/DL (ref 82–115)
HCT VFR BLD AUTO: 40.3 % (ref 37–47)
HGB BLD-MCNC: 13.1 G/DL (ref 12–16)
IMM GRANULOCYTES # BLD AUTO: 0.01 X10(3)/MCL (ref 0–0.04)
IMM GRANULOCYTES NFR BLD AUTO: 0.2 %
LYMPHOCYTES # BLD AUTO: 1.29 X10(3)/MCL (ref 0.6–4.6)
LYMPHOCYTES NFR BLD AUTO: 27.7 %
MCH RBC QN AUTO: 29 PG (ref 27–31)
MCHC RBC AUTO-ENTMCNC: 32.5 G/DL (ref 33–36)
MCV RBC AUTO: 89.4 FL (ref 80–94)
MONOCYTES # BLD AUTO: 0.43 X10(3)/MCL (ref 0.1–1.3)
MONOCYTES NFR BLD AUTO: 9.2 %
NEUTROPHILS # BLD AUTO: 2.76 X10(3)/MCL (ref 2.1–9.2)
NEUTROPHILS NFR BLD AUTO: 59.3 %
PLATELET # BLD AUTO: 116 X10(3)/MCL (ref 130–400)
PMV BLD AUTO: 10.2 FL (ref 7.4–10.4)
POTASSIUM SERPL-SCNC: 4.9 MMOL/L (ref 3.5–5.1)
PROT SERPL-MCNC: 6.8 GM/DL (ref 5.8–7.6)
RBC # BLD AUTO: 4.51 X10(6)/MCL (ref 4.2–5.4)
SODIUM SERPL-SCNC: 144 MMOL/L (ref 136–145)
WBC # BLD AUTO: 4.66 X10(3)/MCL (ref 4.5–11.5)

## 2024-09-20 PROCEDURE — 36415 COLL VENOUS BLD VENIPUNCTURE: CPT

## 2024-09-20 PROCEDURE — 85025 COMPLETE CBC W/AUTO DIFF WBC: CPT

## 2024-09-20 PROCEDURE — 99214 OFFICE O/P EST MOD 30 MIN: CPT | Mod: PBBFAC | Performed by: SURGERY

## 2024-09-20 PROCEDURE — 99999 PR PBB SHADOW E&M-EST. PATIENT-LVL IV: CPT | Mod: PBBFAC,,, | Performed by: SURGERY

## 2024-09-20 PROCEDURE — 86300 IMMUNOASSAY TUMOR CA 15-3: CPT

## 2024-09-20 PROCEDURE — 80053 COMPREHEN METABOLIC PANEL: CPT

## 2024-09-23 ENCOUNTER — DOCUMENTATION ONLY (OUTPATIENT)
Dept: SURGICAL ONCOLOGY | Facility: CLINIC | Age: 84
End: 2024-09-23
Payer: MEDICARE

## 2024-09-23 DIAGNOSIS — C50.111 MALIGNANT NEOPLASM OF CENTRAL PORTION OF RIGHT BREAST IN FEMALE, ESTROGEN RECEPTOR POSITIVE: Primary | ICD-10-CM

## 2024-09-23 DIAGNOSIS — Z17.0 MALIGNANT NEOPLASM OF CENTRAL PORTION OF RIGHT BREAST IN FEMALE, ESTROGEN RECEPTOR POSITIVE: Primary | ICD-10-CM

## 2024-09-23 DIAGNOSIS — C50.919 BREAST CANCER: ICD-10-CM

## 2024-09-23 DIAGNOSIS — Z01.818 PREOP TESTING: ICD-10-CM

## 2024-09-23 LAB — CANCER AG27-29 SERPL-ACNC: 27.5 U/ML

## 2024-09-23 RX ORDER — ENOXAPARIN SODIUM 100 MG/ML
30 INJECTION SUBCUTANEOUS EVERY 24 HOURS
OUTPATIENT
Start: 2024-09-23

## 2024-09-23 RX ORDER — CEFAZOLIN SODIUM 2 G/50ML
2 SOLUTION INTRAVENOUS
OUTPATIENT
Start: 2024-09-23

## 2024-09-23 NOTE — PROGRESS NOTES
Referral faxed to [facility] for mag seed placement to the right breast prior to lumpectomy with Dr. Conner on 10/9/24.

## 2024-09-30 DIAGNOSIS — C50.111 MALIGNANT NEOPLASM OF CENTRAL PORTION OF RIGHT BREAST IN FEMALE, ESTROGEN RECEPTOR POSITIVE: Primary | ICD-10-CM

## 2024-09-30 DIAGNOSIS — Z17.0 MALIGNANT NEOPLASM OF CENTRAL PORTION OF RIGHT BREAST IN FEMALE, ESTROGEN RECEPTOR POSITIVE: Primary | ICD-10-CM

## 2024-10-08 ENCOUNTER — HOSPITAL ENCOUNTER (OUTPATIENT)
Dept: RADIOLOGY | Facility: HOSPITAL | Age: 84
Discharge: HOME | End: 2024-10-08
Attending: SURGERY
Payer: MEDICARE

## 2024-10-08 ENCOUNTER — ANESTHESIA EVENT (OUTPATIENT)
Dept: SURGERY | Facility: HOSPITAL | Age: 84
End: 2024-10-08
Payer: MEDICARE

## 2024-10-08 DIAGNOSIS — C50.111 CARCINOMA OF CENTRAL PORTION OF FEMALE BREAST, RIGHT: ICD-10-CM

## 2024-10-08 PROCEDURE — 19285 PERQ DEV BREAST 1ST US IMAG: CPT | Mod: RT,,, | Performed by: RADIOLOGY

## 2024-10-08 PROCEDURE — 77061 BREAST TOMOSYNTHESIS UNI: CPT | Mod: 26,RT,, | Performed by: RADIOLOGY

## 2024-10-08 PROCEDURE — 77061 BREAST TOMOSYNTHESIS UNI: CPT | Mod: TC,RT

## 2024-10-08 PROCEDURE — A4648 IMPLANTABLE TISSUE MARKER: HCPCS

## 2024-10-08 PROCEDURE — 77065 DX MAMMO INCL CAD UNI: CPT | Mod: 26,RT,, | Performed by: RADIOLOGY

## 2024-10-08 RX ORDER — CEFAZOLIN SODIUM 2 G/50ML
2 SOLUTION INTRAVENOUS
Status: DISCONTINUED | OUTPATIENT
Start: 2024-10-08 | End: 2024-10-08

## 2024-10-09 ENCOUNTER — ANESTHESIA (OUTPATIENT)
Dept: SURGERY | Facility: HOSPITAL | Age: 84
End: 2024-10-09
Payer: MEDICARE

## 2024-10-09 ENCOUNTER — HOSPITAL ENCOUNTER (OUTPATIENT)
Facility: HOSPITAL | Age: 84
Discharge: HOME | End: 2024-10-09
Attending: SURGERY | Admitting: SURGERY
Payer: MEDICARE

## 2024-10-09 DIAGNOSIS — C50.919 BREAST CANCER: ICD-10-CM

## 2024-10-09 DIAGNOSIS — C50.111 MALIGNANT NEOPLASM OF CENTRAL PORTION OF RIGHT BREAST IN FEMALE, ESTROGEN RECEPTOR POSITIVE: ICD-10-CM

## 2024-10-09 DIAGNOSIS — Z17.0 MALIGNANT NEOPLASM OF CENTRAL PORTION OF RIGHT BREAST IN FEMALE, ESTROGEN RECEPTOR POSITIVE: ICD-10-CM

## 2024-10-09 LAB — POCT GLUCOSE: 103 MG/DL (ref 70–110)

## 2024-10-09 PROCEDURE — 36000706: Performed by: SURGERY

## 2024-10-09 PROCEDURE — A9541 TC99M SULFUR COLLOID: HCPCS | Performed by: SURGERY

## 2024-10-09 PROCEDURE — 63600175 PHARM REV CODE 636 W HCPCS: Mod: JG | Performed by: SURGERY

## 2024-10-09 PROCEDURE — 38900 IO MAP OF SENT LYMPH NODE: CPT | Mod: RT,,, | Performed by: SURGERY

## 2024-10-09 PROCEDURE — 38525 BIOPSY/REMOVAL LYMPH NODES: CPT | Mod: 51,RT,, | Performed by: SURGERY

## 2024-10-09 PROCEDURE — 37000008 HC ANESTHESIA 1ST 15 MINUTES: Performed by: SURGERY

## 2024-10-09 PROCEDURE — 25000242 PHARM REV CODE 250 ALT 637 W/ HCPCS: Performed by: ANESTHESIOLOGY

## 2024-10-09 PROCEDURE — 25000003 PHARM REV CODE 250: Performed by: ANESTHESIOLOGY

## 2024-10-09 PROCEDURE — 63600175 PHARM REV CODE 636 W HCPCS: Performed by: SURGERY

## 2024-10-09 PROCEDURE — 19285 PERQ DEV BREAST 1ST US IMAG: CPT | Mod: 59,RT,, | Performed by: SURGERY

## 2024-10-09 PROCEDURE — 71000015 HC POSTOP RECOV 1ST HR: Performed by: SURGERY

## 2024-10-09 PROCEDURE — 63600175 PHARM REV CODE 636 W HCPCS: Performed by: ANESTHESIOLOGY

## 2024-10-09 PROCEDURE — 88307 TISSUE EXAM BY PATHOLOGIST: CPT | Mod: 59 | Performed by: SURGERY

## 2024-10-09 PROCEDURE — 19301 PARTIAL MASTECTOMY: CPT | Mod: RT,,, | Performed by: SURGERY

## 2024-10-09 PROCEDURE — 36000707: Performed by: SURGERY

## 2024-10-09 PROCEDURE — 88342 IMHCHEM/IMCYTCHM 1ST ANTB: CPT

## 2024-10-09 PROCEDURE — 37000009 HC ANESTHESIA EA ADD 15 MINS: Performed by: SURGERY

## 2024-10-09 PROCEDURE — 71000016 HC POSTOP RECOV ADDL HR: Performed by: SURGERY

## 2024-10-09 PROCEDURE — 71000033 HC RECOVERY, INTIAL HOUR: Performed by: SURGERY

## 2024-10-09 PROCEDURE — 63600175 PHARM REV CODE 636 W HCPCS: Performed by: NURSE ANESTHETIST, CERTIFIED REGISTERED

## 2024-10-09 PROCEDURE — C1819 TISSUE LOCALIZATION-EXCISION: HCPCS | Performed by: SURGERY

## 2024-10-09 PROCEDURE — A4648 IMPLANTABLE TISSUE MARKER: HCPCS | Performed by: SURGERY

## 2024-10-09 PROCEDURE — 76998 US GUIDE INTRAOP: CPT | Mod: 26,,, | Performed by: SURGERY

## 2024-10-09 PROCEDURE — 88341 IMHCHEM/IMCYTCHM EA ADD ANTB: CPT

## 2024-10-09 RX ORDER — FENTANYL CITRATE 50 UG/ML
INJECTION, SOLUTION INTRAMUSCULAR; INTRAVENOUS
Status: DISCONTINUED | OUTPATIENT
Start: 2024-10-09 | End: 2024-10-09

## 2024-10-09 RX ORDER — ISOSULFAN BLUE 50 MG/5ML
INJECTION, SOLUTION SUBCUTANEOUS
Status: DISCONTINUED | OUTPATIENT
Start: 2024-10-09 | End: 2024-10-09 | Stop reason: HOSPADM

## 2024-10-09 RX ORDER — SODIUM CHLORIDE, SODIUM GLUCONATE, SODIUM ACETATE, POTASSIUM CHLORIDE AND MAGNESIUM CHLORIDE 30; 37; 368; 526; 502 MG/100ML; MG/100ML; MG/100ML; MG/100ML; MG/100ML
INJECTION, SOLUTION INTRAVENOUS CONTINUOUS
Status: DISCONTINUED | OUTPATIENT
Start: 2024-10-09 | End: 2024-10-09 | Stop reason: HOSPADM

## 2024-10-09 RX ORDER — IPRATROPIUM BROMIDE AND ALBUTEROL SULFATE 2.5; .5 MG/3ML; MG/3ML
SOLUTION RESPIRATORY (INHALATION)
Status: DISCONTINUED
Start: 2024-10-09 | End: 2024-10-09 | Stop reason: HOSPADM

## 2024-10-09 RX ORDER — MIDAZOLAM HYDROCHLORIDE 2 MG/2ML
0.5 INJECTION, SOLUTION INTRAMUSCULAR; INTRAVENOUS ONCE AS NEEDED
Status: DISCONTINUED | OUTPATIENT
Start: 2024-10-09 | End: 2024-10-09 | Stop reason: HOSPADM

## 2024-10-09 RX ORDER — LIDOCAINE HYDROCHLORIDE 10 MG/ML
1 INJECTION, SOLUTION EPIDURAL; INFILTRATION; INTRACAUDAL; PERINEURAL ONCE
Status: COMPLETED | OUTPATIENT
Start: 2024-10-09 | End: 2024-10-09

## 2024-10-09 RX ORDER — SODIUM CHLORIDE, SODIUM LACTATE, POTASSIUM CHLORIDE, CALCIUM CHLORIDE 600; 310; 30; 20 MG/100ML; MG/100ML; MG/100ML; MG/100ML
INJECTION, SOLUTION INTRAVENOUS CONTINUOUS
Status: DISCONTINUED | OUTPATIENT
Start: 2024-10-09 | End: 2024-10-09 | Stop reason: HOSPADM

## 2024-10-09 RX ORDER — ONDANSETRON 4 MG/1
8 TABLET, ORALLY DISINTEGRATING ORAL EVERY 6 HOURS PRN
Status: DISCONTINUED | OUTPATIENT
Start: 2024-10-09 | End: 2024-10-09 | Stop reason: HOSPADM

## 2024-10-09 RX ORDER — ONDANSETRON HYDROCHLORIDE 2 MG/ML
4 INJECTION, SOLUTION INTRAVENOUS DAILY PRN
Status: DISCONTINUED | OUTPATIENT
Start: 2024-10-09 | End: 2024-10-09 | Stop reason: HOSPADM

## 2024-10-09 RX ORDER — HYDROMORPHONE HYDROCHLORIDE 2 MG/ML
0.2 INJECTION, SOLUTION INTRAMUSCULAR; INTRAVENOUS; SUBCUTANEOUS EVERY 5 MIN PRN
Status: CANCELLED | OUTPATIENT
Start: 2024-10-09

## 2024-10-09 RX ORDER — MEPERIDINE HYDROCHLORIDE 25 MG/ML
12.5 INJECTION INTRAMUSCULAR; INTRAVENOUS; SUBCUTANEOUS EVERY 10 MIN PRN
Status: DISCONTINUED | OUTPATIENT
Start: 2024-10-09 | End: 2024-10-09 | Stop reason: HOSPADM

## 2024-10-09 RX ORDER — BUPIVACAINE HYDROCHLORIDE 5 MG/ML
INJECTION, SOLUTION EPIDURAL; INTRACAUDAL
Status: DISCONTINUED | OUTPATIENT
Start: 2024-10-09 | End: 2024-10-09 | Stop reason: HOSPADM

## 2024-10-09 RX ORDER — TECHNETIUM TC 99M SULFUR COLLOID 2 MG
1.1 KIT MISCELLANEOUS
Status: COMPLETED | OUTPATIENT
Start: 2024-10-09 | End: 2024-10-09

## 2024-10-09 RX ORDER — ENOXAPARIN SODIUM 100 MG/ML
30 INJECTION SUBCUTANEOUS EVERY 24 HOURS
Status: DISCONTINUED | OUTPATIENT
Start: 2024-10-09 | End: 2024-10-09

## 2024-10-09 RX ORDER — CELECOXIB 200 MG/1
200 CAPSULE ORAL ONCE
Status: COMPLETED | OUTPATIENT
Start: 2024-10-09 | End: 2024-10-09

## 2024-10-09 RX ORDER — ENOXAPARIN SODIUM 100 MG/ML
30 INJECTION SUBCUTANEOUS EVERY 24 HOURS
Status: DISCONTINUED | OUTPATIENT
Start: 2024-10-09 | End: 2024-10-09 | Stop reason: HOSPADM

## 2024-10-09 RX ORDER — GLUCAGON 1 MG
1 KIT INJECTION
Status: CANCELLED | OUTPATIENT
Start: 2024-10-09

## 2024-10-09 RX ORDER — CEFAZOLIN SODIUM 1 G/3ML
INJECTION, POWDER, FOR SOLUTION INTRAMUSCULAR; INTRAVENOUS
Status: DISCONTINUED | OUTPATIENT
Start: 2024-10-09 | End: 2024-10-09

## 2024-10-09 RX ORDER — ISOSULFAN BLUE 50 MG/5ML
INJECTION, SOLUTION SUBCUTANEOUS
Status: DISCONTINUED
Start: 2024-10-09 | End: 2024-10-09 | Stop reason: HOSPADM

## 2024-10-09 RX ORDER — BUPIVACAINE HYDROCHLORIDE 5 MG/ML
INJECTION, SOLUTION EPIDURAL; INTRACAUDAL
Status: DISCONTINUED
Start: 2024-10-09 | End: 2024-10-09 | Stop reason: HOSPADM

## 2024-10-09 RX ORDER — CEFAZOLIN 2 G/1
2 INJECTION, POWDER, FOR SOLUTION INTRAMUSCULAR; INTRAVENOUS
Status: DISCONTINUED | OUTPATIENT
Start: 2024-10-09 | End: 2024-10-09 | Stop reason: HOSPADM

## 2024-10-09 RX ORDER — IPRATROPIUM BROMIDE AND ALBUTEROL SULFATE 2.5; .5 MG/3ML; MG/3ML
3 SOLUTION RESPIRATORY (INHALATION)
Status: DISCONTINUED | OUTPATIENT
Start: 2024-10-09 | End: 2024-10-09 | Stop reason: HOSPADM

## 2024-10-09 RX ORDER — PROCHLORPERAZINE EDISYLATE 5 MG/ML
5 INJECTION INTRAMUSCULAR; INTRAVENOUS EVERY 30 MIN PRN
Status: DISCONTINUED | OUTPATIENT
Start: 2024-10-09 | End: 2024-10-09 | Stop reason: HOSPADM

## 2024-10-09 RX ORDER — HYDROMORPHONE HYDROCHLORIDE 2 MG/ML
0.4 INJECTION, SOLUTION INTRAMUSCULAR; INTRAVENOUS; SUBCUTANEOUS EVERY 5 MIN PRN
Status: DISCONTINUED | OUTPATIENT
Start: 2024-10-09 | End: 2024-10-09 | Stop reason: HOSPADM

## 2024-10-09 RX ORDER — IPRATROPIUM BROMIDE AND ALBUTEROL SULFATE 2.5; .5 MG/3ML; MG/3ML
3 SOLUTION RESPIRATORY (INHALATION) ONCE
Status: COMPLETED | OUTPATIENT
Start: 2024-10-09 | End: 2024-10-09

## 2024-10-09 RX ORDER — ACETAMINOPHEN 10 MG/ML
1000 INJECTION, SOLUTION INTRAVENOUS ONCE
Status: COMPLETED | OUTPATIENT
Start: 2024-10-09 | End: 2024-10-09

## 2024-10-09 RX ORDER — PROPOFOL 10 MG/ML
VIAL (ML) INTRAVENOUS
Status: DISCONTINUED | OUTPATIENT
Start: 2024-10-09 | End: 2024-10-09

## 2024-10-09 RX ADMIN — FENTANYL CITRATE 50 MCG: 50 INJECTION, SOLUTION INTRAMUSCULAR; INTRAVENOUS at 08:10

## 2024-10-09 RX ADMIN — CEFAZOLIN 2 G: 330 INJECTION, POWDER, FOR SOLUTION INTRAMUSCULAR; INTRAVENOUS at 08:10

## 2024-10-09 RX ADMIN — PROPOFOL 100 MG: 10 INJECTION, EMULSION INTRAVENOUS at 08:10

## 2024-10-09 RX ADMIN — CELECOXIB 200 MG: 200 CAPSULE ORAL at 07:10

## 2024-10-09 RX ADMIN — LIDOCAINE HYDROCHLORIDE 50 MG: 10 INJECTION, SOLUTION EPIDURAL; INFILTRATION; INTRACAUDAL; PERINEURAL at 08:10

## 2024-10-09 RX ADMIN — FENTANYL CITRATE 25 MCG: 50 INJECTION, SOLUTION INTRAMUSCULAR; INTRAVENOUS at 08:10

## 2024-10-09 RX ADMIN — ACETAMINOPHEN 1000 MG: 10 INJECTION, SOLUTION INTRAVENOUS at 07:10

## 2024-10-09 RX ADMIN — SODIUM CHLORIDE, POTASSIUM CHLORIDE, SODIUM LACTATE AND CALCIUM CHLORIDE: 600; 310; 30; 20 INJECTION, SOLUTION INTRAVENOUS at 09:10

## 2024-10-09 RX ADMIN — PROPOFOL 50 MG: 10 INJECTION, EMULSION INTRAVENOUS at 08:10

## 2024-10-09 RX ADMIN — TECHNETIUM TC 99M SULFUR COLLOID KIT 1.1 MILLICURIE: KIT at 08:10

## 2024-10-09 RX ADMIN — ENOXAPARIN SODIUM 30 MG: 30 INJECTION SUBCUTANEOUS at 07:10

## 2024-10-09 RX ADMIN — SODIUM CHLORIDE, POTASSIUM CHLORIDE, SODIUM LACTATE AND CALCIUM CHLORIDE: 600; 310; 30; 20 INJECTION, SOLUTION INTRAVENOUS at 07:10

## 2024-10-09 RX ADMIN — FENTANYL CITRATE 25 MCG: 50 INJECTION, SOLUTION INTRAMUSCULAR; INTRAVENOUS at 09:10

## 2024-10-09 RX ADMIN — SODIUM CHLORIDE, POTASSIUM CHLORIDE, SODIUM LACTATE AND CALCIUM CHLORIDE: 600; 310; 30; 20 INJECTION, SOLUTION INTRAVENOUS at 08:10

## 2024-10-09 RX ADMIN — IPRATROPIUM BROMIDE AND ALBUTEROL SULFATE 3 ML: .5; 3 SOLUTION RESPIRATORY (INHALATION) at 07:10

## 2024-10-09 NOTE — ANESTHESIA PROCEDURE NOTES
Intubation    Date/Time: 10/9/2024 8:16 AM    Performed by: iBna Gunn CRNA  Authorized by: Ezra Smith Jr., MD    Intubation:     Induction:  Intravenous    Intubated:  Postinduction    Mask Ventilation:  Easy with oral airway    Attempts:  1    Attempted By:  CRNA    Difficult Airway Encountered?: No      Complications:  None    Airway Device:  Supraglottic airway/LMA    Airway Device Size:  4.0    Style/Cuff Inflation:  Cuffed (inflated to minimal occlusive pressure)    Placement Verified By:  Capnometry    Complicating Factors:  None    Findings Post-Intubation:  BS equal bilateral

## 2024-10-09 NOTE — ANESTHESIA POSTPROCEDURE EVALUATION
Anesthesia Post Evaluation    Patient: Wendie Ferreira    Procedure(s) Performed: Procedure(s) (LRB):  LUMPECTOMY, BREAST // Right / ultrasound guided / Mag seed (Right)  BIOPSY, LYMPH NODE, SENTINEL / Right / axillary (Right)    Final Anesthesia Type: general      Patient location during evaluation: floor  Patient participation: Yes- Able to Participate  Level of consciousness: awake and alert  Post-procedure vital signs: reviewed and stable  Pain management: adequate  Airway patency: patent    PONV status at discharge: No PONV  Anesthetic complications: no      Cardiovascular status: blood pressure returned to baseline and bradycardic  Respiratory status: spontaneous ventilation and room air  Hydration status: euvolemic  Follow-up not needed.              Vitals Value Taken Time   /74 10/09/24 1105   Temp 36.5 °C (97.7 °F) 10/09/24 1010   Pulse 59 10/09/24 1105   Resp 17 10/09/24 1011   SpO2 92 % 10/09/24 1105   Vitals shown include unfiled device data.      Event Time   Out of Recovery 10:18:00         Pain/Mike Score: Pain Rating Prior to Med Admin: 0 (10/9/2024  7:28 AM)  Mike Score: 10 (10/9/2024 10:20 AM)

## 2024-10-09 NOTE — TRANSFER OF CARE
"Anesthesia Transfer of Care Note    Patient: Wendie Ferreira    Procedure(s) Performed: Procedure(s) (LRB):  LUMPECTOMY, BREAST // Right / ultrasound guided / Mag seed (Right)  BIOPSY, LYMPH NODE, SENTINEL / Right / axillary (Right)    Patient location: PACU    Anesthesia Type: general    Transport from OR: Transported from OR on room air with adequate spontaneous ventilation    Post pain: adequate analgesia    Post assessment: no apparent anesthetic complications    Post vital signs: stable    Level of consciousness: responds to stimulation    Nausea/Vomiting: no nausea/vomiting    Complications: none    Transfer of care protocol was followed      Last vitals: Visit Vitals  BP (!) 166/63 (BP Location: Left arm, Patient Position: Lying)   Pulse 69   Temp 36.9 °C (98.4 °F) (Oral)   Resp (!) 94   Ht 5' 4" (1.626 m)   Wt 75.8 kg (167 lb 1.7 oz)   SpO2 100%   Breastfeeding No   BMI 28.68 kg/m²     "

## 2024-10-09 NOTE — ANESTHESIA PREPROCEDURE EVALUATION
"                                                                                                             10/09/2024  Wendie Ferreira is a 84 y.o., female with CAD status post stent (last in 2017) and cirrhosis w/ varices presents as an outpatient for right breast lumpectomy sentinel lymph node biopsy (breast cancer).  Hypercalcemia and mild thrombocytopenia noted    Left heart catheterization 2017   Left main normal   Lad 50% mid 60% proximal and 1st diagonal   Left circumflex 80% InStent restenosis at obtuse marginal treated with ERIC  % proximal with collateral flow   EF 50%    Transthoracic echo 2021   EF 55% with grade 1 diastolic dysfunction   Mild aortic stenosis (ASHLY 1.5 with peak velocity of 2.3)  Trace MR/TR   RVSP 30    Last 3 sets of Vitals        9/20/2024    11:05 AM 10/3/2024     9:12 AM 10/9/2024     6:55 AM   Vitals - 1 value per visit   SYSTOLIC 145  166   DIASTOLIC 79  63   Pulse 95  69   Temp   36.9 °C (98.4 °F)   Resp 20  18   SPO2 99 %  94 %   Weight (lb) 169.4 168    Weight (kg) 76.839 76.204    Height 5' 4" (1.626 m) 5' 4" (1.626 m)    BMI (Calculated) 29.1 28.8          Lab Results   Component Value Date    WBC 4.66 09/20/2024    HGB 13.1 09/20/2024    HCT 40.3 09/20/2024    MCV 89.4 09/20/2024     (L) 09/20/2024          BMP  Lab Results   Component Value Date     09/20/2024    K 4.9 09/20/2024     09/20/2024    CO2 29 09/20/2024    BUN 17.9 09/20/2024    CREATININE 0.80 09/20/2024    CALCIUM 10.5 (H) 09/20/2024    EGFRNONAA >60 05/20/2022       Pre-op Assessment    I have reviewed the Patient Summary Reports.    I have reviewed the NPO Status.   I have reviewed the Medications.     Review of Systems  Anesthesia Hx:               Denies Personal Hx of Anesthesia complications.                    Social:  Non-Smoker       Cardiovascular:     Hypertension                Functional Capacity good / => 4 METS   Coronary Artery Disease:      S/P Percutaneous " Coronary Intervention (PCI)   S/P Drug Eluting Stent (ERIC)   Hx of Myocardial Infarction                        Hepatic/GI:     GERD          Liver Disease  , Cirrhosis  Esophageal Varices, Thrombocytopenia    Endocrine:  Diabetes, type 2 Hypothyroidism              Physical Exam  General: Well nourished, Cooperative, Alert and Oriented  Jaw tremor  Airway:  Mallampati: II   Mouth Opening: Normal  TM Distance: Normal  Tongue: Normal  Neck ROM: Normal ROM    Dental:  Partial Dentures, Periodontal disease    Chest/Lungs:  Clear to auscultation, Normal Respiratory Rate    Heart:  Rate: Normal  Rhythm: Regular Rhythm        Anesthesia Plan  Type of Anesthesia, risks & benefits discussed:    Anesthesia Type: Gen Supraglottic Airway  Intra-op Monitoring Plan: Standard ASA Monitors  Post Op Pain Control Plan: multimodal analgesia and IV/PO Opioids PRN  Induction:  IV  Airway Plan: Direct  Informed Consent: Informed consent signed with the Patient and all parties understand the risks and agree with anesthesia plan.  All questions answered.   ASA Score: 3  Day of Surgery Review of History & Physical: H&P Update referred to the surgeon/provider.    Ready For Surgery From Anesthesia Perspective.     .

## 2024-10-10 VITALS
BODY MASS INDEX: 28.53 KG/M2 | TEMPERATURE: 98 F | RESPIRATION RATE: 18 BRPM | HEIGHT: 64 IN | DIASTOLIC BLOOD PRESSURE: 74 MMHG | OXYGEN SATURATION: 96 % | WEIGHT: 167.13 LBS | SYSTOLIC BLOOD PRESSURE: 143 MMHG | HEART RATE: 59 BPM

## 2024-10-10 LAB — POCT GLUCOSE: 101 MG/DL (ref 70–110)

## 2024-10-15 LAB — PSYCHE PATHOLOGY RESULT: NORMAL

## 2024-10-16 ENCOUNTER — OFFICE VISIT (OUTPATIENT)
Dept: HEMATOLOGY/ONCOLOGY | Facility: CLINIC | Age: 84
End: 2024-10-16
Payer: MEDICARE

## 2024-10-16 VITALS
BODY MASS INDEX: 29 KG/M2 | RESPIRATION RATE: 18 BRPM | TEMPERATURE: 98 F | WEIGHT: 169.88 LBS | OXYGEN SATURATION: 97 % | SYSTOLIC BLOOD PRESSURE: 138 MMHG | HEART RATE: 71 BPM | DIASTOLIC BLOOD PRESSURE: 62 MMHG | HEIGHT: 64 IN

## 2024-10-16 DIAGNOSIS — Z17.0 MALIGNANT NEOPLASM OF CENTRAL PORTION OF RIGHT BREAST IN FEMALE, ESTROGEN RECEPTOR POSITIVE: ICD-10-CM

## 2024-10-16 DIAGNOSIS — C18.2 MALIGNANT NEOPLASM OF ASCENDING COLON: ICD-10-CM

## 2024-10-16 DIAGNOSIS — Z51.81 ENCOUNTER FOR MONITORING CARDIOTOXIC DRUG THERAPY: Primary | ICD-10-CM

## 2024-10-16 DIAGNOSIS — C50.111 MALIGNANT NEOPLASM OF CENTRAL PORTION OF RIGHT BREAST IN FEMALE, ESTROGEN RECEPTOR POSITIVE: ICD-10-CM

## 2024-10-16 DIAGNOSIS — Z79.899 ENCOUNTER FOR MONITORING CARDIOTOXIC DRUG THERAPY: Primary | ICD-10-CM

## 2024-10-16 PROCEDURE — 99999 PR PBB SHADOW E&M-EST. PATIENT-LVL V: CPT | Mod: PBBFAC,,, | Performed by: INTERNAL MEDICINE

## 2024-10-16 PROCEDURE — 99215 OFFICE O/P EST HI 40 MIN: CPT | Mod: PBBFAC | Performed by: INTERNAL MEDICINE

## 2024-10-16 NOTE — PROGRESS NOTES
HEMATOLOGY/ONCOLOGY OFFICE CLINIC VISIT    Visit Information:    Initial Evaluation: 5/26/2021  Referring Provider: Dr. Robert Conner  Other providers:  Code status:  Not addressed    Diagnosis:  1) T2N0M0-Stage I Colon cancer. Dx on 4/22/21  2) Invasive lobular carcinoma right breast Dx 8/26//2024  ---ER 89.40%, GA 82.10%, HER2 equivocal, 2+, amplified by fish, Ki 67, high proliferation, 57.30%    Present treatment:    TBD    Treatment/Oncology history:  -03/15/2021: Colonoscopy: malignant partially obstructing tumor in the ascending colon. Biopsy tubular adenoma with at least high-grade dysplasia.   -04/22/2021: Laparoscopic/robotic right hemicolectomy   -08/15/2024: Abnormal right breast mammogram BI-RADS category 4, suspicious  -08/26/2024: Ultrasound-guided right breast biopsy at 1:00 a.m. position--> invasive lobular carcinoma      Imaging:  CT A/P 3/17/2021 at [facility]: irregular colonic mass 5 to 6 cm in length proximal to midportion of descending colon.  Enhancing soft tissue component contacts and may invade the right lateral abdominal wall.  No regional or distant lymphadenopathy identified.  Cirrhotic liver with portal hypertension, small volume ascites and splenomegaly.  No focal lesion identified in the liver.    CT of the thorax 4/5/2021: no thoracic metastatic disease identified.  Small volume ascites and borderline splenomegaly.No adenopathy or distant metastases.  CT angiogram 5/20/2021: negative for pulmonary embolism but lungs demonstrate mild heterogeneous attenuation with subtle peripheral reticular opacities.  Primary differential consideration is small airways disease.  Findings may be secondary to multifocal infiltrate from infection versus pulmonary edema.  It also showed moderate ascites with increase in size since prior examination.  Liver with nodular contour and was enlarged.  Spleen borderline enlarged.  A 1.3 x 1 cm enhancing nodule in the left adrenal gland no significant  change.  Ultrasound of the abdomen 5/23/2021:cirrhosis of the liver with hepatosplenomegaly and ascites.   Ultrasound of the abdomen 10/26/2022: The pancreas appears grossly unremarkable.  No pancreatic mass or lesion is seen. liver is slightly enlarged in size. Liver is echogenic it measures 16 cm by my measurements..  No liver mass or lesion is seen. spleen appears enlarged and measures 14.5 cm.  Hepatomegaly with findings consistent with hepatic steatosis  CT C/A/P 2/16/2023:     1. No evidence of metastatic disease within the chest, abdomen and pelvis  2. Cirrhotic morphology of the liver  3. Changes of portal hypertension including borderline splenomegaly and portosystemic collateral  4. Mild interstitial scarring within the lungs.  CT C/A/P  8/28/2023:  No new suspicious findings chest, abdomen or pelvis.  Chronic findings above.  CT C/A/P 2/21/2024: No detrimental change identified since 08/28/2023.   CT 8/22/2024: Similar mild chronic changes in the lungs with no suspicious pulmonary nodule.  Moderate coronary artery calcification.  No pleural or pericardial effusion.  No pathologically enlarged thoracic lymph node. Cirrhosis with mild hepatic steatosis.  Similar mild splenomegaly.  Stable pancreas, adrenal glands and kidneys.  Very small hiatal hernia.  Normal caliber small bowel and colon with right mid abdominal ileal colonic anastomosis.  Distal colonic diverticulosis.  Normal bladder.  No ascites or abdominal/pelvic lymphadenopathy.  Aortoiliac atherosclerosis. No aggressive osseous lesion.No metastatic disease identified.     Pathology:  4/22/2022:   RIGHT COLON, HEMICOLECTOMY: ADENOCARCINOMA, WELL DIFFERENTIATED, 90% MUCINOUS.   --  Greatest tumor dimension, 7.0 cm (as measured grossly).  --  Adenocarcinoma arises from tubular adenoma with high grade dysplasia.  --  Lymphovascular invasion, not identified.  --  Adenocarcinoma superficially invades muscularis propria.  --  Eighteen mesenteric lymph  nodes, no neoplasm (0/18).  --  Surgical margins, uninvolved.  POSTOPERATIVE SPINDLE CELL PSEUDOTUMOR, 1.5 CM.  -  The pseudotumor extends into mesenteric adipose tissue.   9/24/2021:  LIVER, CORE NEEDLE BIOPSY: FOCAL PORTAL TRIADITIS WITH SINUS DILATION.   - NO INTERFACE ACTIVITY.   - BRIDING FIBROSIS (FIBROSIS STAGE  F2-3)  - NO STEATOSIS.  Comment: Despite hepatitis testing demonstrating reactivity for hepatitis A in July 2021, no evidence of acute hepatitis is identified and LFTs are currently within normal limits. This case has also been reviewed in intradepartmental consultation.  8/26/2024:  RIGHT BREAST, 1 O'CLOCK MASS ULTRASOUND GUIDED VACUUM BIOPSY:  - Invasive carcinoma with lobular features, Xavier Grade 3, see comment.  - Carcinoma involving 3 of 4 cores, 7 mm in longest linear extent.  ER 89.40%, MT 82.10%, HER2 equivocal, 2+, amplified by fish, Ki 67, high proliferation, 57.30%  10/9/2024:  1. BREAST, RIGHT, LUMPECTOMY:   PLEOMORPHIC LOBULAR CARCINOMA - SEE SYNOPTIC REPORT.   DUCTAL CARCINOMA IN SITU WITH LOBULAR EXTENSION.   SITE OF PRIOR BIOPSY.   ADENOMATOID CHANGE.   2. BREAST, SUPERIOR MARGIN, REEXCISION:   NEGATIVE FOR INVASIVE AND IN SITU CARCINOMA.   3. AXILLA, ADDITIONAL AXILLA CONTENTS, EXCISION:   TWO LYMPH NODES, NEGATIVE FOR METASTATIC CARCINOMA.   4. LYMPH NODE, RIGHT SENTINEL #1, EXCISION:   ONE LYMPH NODE, NEGATIVE FOR METASTATIC CARCINOMA.   5. LYMPH NODE, RIGHT SENTINEL #2, EXCISION:   ONE LYMPH NODE, NEGATIVE FOR METASTATIC CARCINOMA.   6. LYMPH NODES, RIGHT SENTINEL #3, EXCISION:   TWO LYMPH NODES, NEGATIVE FOR METASTATIC CARCINOMA.     CLINICAL HISTORY:       Patient: 82 year old female with multiple medical problems kindly referred for colon cancer.  She initially presented with anemia and fecal occult blood positive.  She also reports 20 pounds weight loss over the last previous 6 months. Colonoscopy on 3/15/2021 revealed a malignant partially obstructing tumor in the  ascending colon. Biopsy showed tubular adenoma with at least high-grade dysplasia.  On 3/17/2021 she underwent a CT scan of the abdomen and pelvis at [facility] that showed an irregular colonic mass measuring about 5 to 6 cm in length involving proximal to midportion of descending colon.  Enhancing soft tissue component contacts and may invade the right lateral abdominal wall.  No regional or distant lymphadenopathy identified.  Cirrhotic liver with portal hypertension, small volume ascites and splenomegaly.  No focal lesion identified in the liver.  Staging CT of the thorax 4/5/2021 with no thoracic metastatic disease identified.  Small volume ascites and borderline splenomegaly.No adenopathy or distant metastases.     Patient was seen by Dr. Robert Conner and she underwent laparoscopic/robotic right hemicolectomy on 4/22/2021.  Pathology report as follows:   RIGHT COLON, HEMICOLECTOMY: ADENOCARCINOMA, WELL DIFFERENTIATED, 90% MUCINOUS. 0/18 LN positive for malignanacy.See above for details.    Patient recovered from the surgery but came back to the emergency department for chest pain. She underwent CT angiogram that was negative for pulmonary embolism but lungs demonstrate mild heterogeneous attenuation with subtle peripheral reticular opacities.  Primary differential consideration is small airways disease.  Findings may be secondary to multifocal infiltrate from infection versus pulmonary edema.  It also showed moderate ascites with increase in size since prior examination.  Liver with nodular contour and was enlarged.  Spleen borderline enlarged.  1.3 x 1 cm enhancing nodule in the left adrenal gland no significant change.    She underwent ultrasound of the abdomen that confirmed the diagnosis of cirrhosis of the liver with hepatosplenomegaly and ascites.  She was discharged on 5/20/2021.    She stopped drinking at age 38, she says that she is a social drinker. She denies any family history of colon cancer.  "She denies any fever, chills, or sweats. No chest pain or shortness of breath.    She had liver biopsy ordered by Dr. Louis on 9/24/21. It was ordered for cirrhosis. She has no history of hepatitis infection or alcohol abuse. Biopsy was negative for malignancy. Just  fibrosis.              Chief Complaint: 2 Week Follow Up      Interval History:  Patient presents today for follow up after right breast biopsy results, accompanied by her daughter. She underwent right breast lumpectomy with biopsy on 10/9/24 with Dr. Conner.  She is doing well, remains active. She has no complaints today.  Denies pain, bleeding, fever, chills, sweats.  No chest pain or shortness of breath.    In terms of her colon cancer, she continues to follow Dr. Louis, last colonoscopy 8/2023. Recent surveillance scans with [facility].  Biopsy results and treatment recommendations discussed with them in detail. All questions answered.        ROS: All 14 points ROS taken and as per Interval History  Review of Systems   Respiratory:  Negative for cough and shortness of breath.    Cardiovascular:  Negative for chest pain.   Gastrointestinal:  Positive for diarrhea. Negative for abdominal pain.   Genitourinary:  Positive for frequency.   Musculoskeletal:  Negative for back pain.   Skin:  Negative for rash.   Neurological:  Negative for headaches.   Psychiatric/Behavioral:  The patient is nervous/anxious.          Histories:  PMH/PSH/FH/SOCIAL/ALLERGIES AND MEDS REVIEWED AND UPDATED AS APPROPRIATE       Vitals:    10/16/24 1016   BP: 138/62   BP Location: Left arm   Patient Position: Sitting   Pulse: 71   Resp: 18   Temp: 98 °F (36.7 °C)   TempSrc: Oral   SpO2: 97%   Weight: 77.1 kg (169 lb 14.4 oz)   Height: 5' 4" (1.626 m)           Wt Readings from Last 6 Encounters:   10/16/24 77.1 kg (169 lb 14.4 oz)   10/09/24 75.8 kg (167 lb 1.7 oz)   09/20/24 76.8 kg (169 lb 6.4 oz)   09/12/24 75.6 kg (166 lb 9.6 oz)   08/30/24 75.3 kg (166 lb)   08/07/24 76.4 kg (168 " lb 8 oz)     Body mass index is 29.16 kg/m².  Body surface area is 1.87 meters squared.       Physical Exam  Constitutional:       Appearance: Normal appearance.   HENT:      Head: Normocephalic and atraumatic.   Eyes:      General: No scleral icterus.     Extraocular Movements: Extraocular movements intact.      Conjunctiva/sclera: Conjunctivae normal.   Neck:      Vascular: No JVD.   Cardiovascular:      Rate and Rhythm: Normal rate and regular rhythm.      Heart sounds: No murmur heard.  Pulmonary:      Effort: Pulmonary effort is normal.      Breath sounds: Normal breath sounds. No wheezing or rhonchi.   Chest:   Breasts:     Right: Normal. No swelling, mass, nipple discharge, skin change or tenderness.      Left: Normal. No swelling, mass, nipple discharge, skin change or tenderness.      Comments: Biopsy site with very small resolving hematoma  Abdominal:      General: Bowel sounds are normal. There is no distension.      Palpations: Abdomen is soft. There is no mass.      Tenderness: There is no abdominal tenderness.   Musculoskeletal:      Cervical back: Neck supple.   Lymphadenopathy:      Head:      Right side of head: No submental or submandibular adenopathy.      Left side of head: No submental or submandibular adenopathy.      Cervical: No cervical adenopathy.      Upper Body:      Right upper body: No supraclavicular or axillary adenopathy.      Left upper body: No supraclavicular or axillary adenopathy.      Lower Body: No right inguinal adenopathy. No left inguinal adenopathy.   Skin:     General: Skin is warm.      Coloration: Skin is not jaundiced.      Findings: No lesion or rash.      Nails: There is no clubbing.   Neurological:      Mental Status: She is alert and oriented to person, place, and time.      Cranial Nerves: Cranial nerves 2-12 are intact.   Psychiatric:         Attention and Perception: Attention normal.         Behavior: Behavior is cooperative.         Judgment: Judgment normal.        ECOG SCORE    1 - Restricted in strenuous activity-ambulatory and able to carry out work of a light nature         Laboratory:  CBC with Differential:  Lab Results   Component Value Date    WBC 4.66 09/20/2024    RBC 4.51 09/20/2024    HGB 13.1 09/20/2024    HCT 40.3 09/20/2024    MCV 89.4 09/20/2024    MCH 29.0 09/20/2024    MCHC 32.5 (L) 09/20/2024    RDW 13.4 09/20/2024     (L) 09/20/2024    MPV 10.2 09/20/2024        CMP:  Sodium   Date Value Ref Range Status   09/20/2024 144 136 - 145 mmol/L Final     Potassium   Date Value Ref Range Status   09/20/2024 4.9 3.5 - 5.1 mmol/L Final     Chloride   Date Value Ref Range Status   09/20/2024 105 98 - 107 mmol/L Final     CO2   Date Value Ref Range Status   09/20/2024 29 23 - 31 mmol/L Final     Blood Urea Nitrogen   Date Value Ref Range Status   09/20/2024 17.9 9.8 - 20.1 mg/dL Final     Creatinine   Date Value Ref Range Status   09/20/2024 0.80 0.55 - 1.02 mg/dL Final     Calcium   Date Value Ref Range Status   09/20/2024 10.5 (H) 8.4 - 10.2 mg/dL Final     Albumin   Date Value Ref Range Status   09/20/2024 4.0 3.4 - 4.8 g/dL Final     Bilirubin Total   Date Value Ref Range Status   09/20/2024 0.7 <=1.5 mg/dL Final     ALP   Date Value Ref Range Status   09/20/2024 47 40 - 150 unit/L Final     AST   Date Value Ref Range Status   09/20/2024 20 5 - 34 unit/L Final     ALT   Date Value Ref Range Status   09/20/2024 17 0 - 55 unit/L Final     Estimated GFR-Non    Date Value Ref Range Status   05/20/2022 >60 mls/min/1.73/m2 Final         Assessment:       1. Encounter for monitoring cardiotoxic drug therapy    2. Malignant neoplasm of central portion of right breast in female, estrogen receptor positive    3. Malignant neoplasm of ascending colon        1) pT2pN0M0--Stage I colon cancer--diagnosed in April 2021   -3/15/2021:Colonoscopy: malignant partially obstructing tumor in the ascending colon. Biopsy tubular adenoma with at least  high-grade dysplasia.    -4/22/2021: Laparoscopic/robotic right hemicolectomy    -Path: Adenocarcinoma superficially invades muscularis propria. Eighteen mesenteric lymph nodes, no neoplasm (0/18).     NCCN guidelines (COL-3) for pathological stage T2, N0, M0   Surveillance (COL-8): colonoscopy in 1 year after surgery and then in 1 year for advanced adenoma, if no advanced adenoma then repeat in 3 yrs and then q 5 yrs.   CT C/A/P 8/22/2024 with [facility], stable benign findings    2) Invasive lobular carcinoma right breast Dx 8/26//2024  ---ER 89.40%, FL 82.10%, HER2 equivocal, 2+, amplified by fish, Ki 67, high proliferation, 57.30%   Discussed briefly diagnosis, prognosis and treatment recommendations according to NCCN guidelines.  We will refer to Dr. Robert Conner for surgical evaluation.    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  These are benign lesions.    4) Liver disease--Cirrhosis of the liver with Splenomegaly and ascites      Plan:       No clinical evidence of colon cancer recurrence.  CT C/A/P with [facility], stable benign findings. Patient with an abnormal mammogram for which she underwent right breast biopsy and pathology positive for Invasive lobular carcinoma. S/p lumpectomy - Stage ER/FL Her2 pos.   Discussed with the patient and her daughter diagnosis, prognosis and treatment recommendations according to staging and receptors.  She is 84 and with comorbidities.  I think that she can benefit of Herceptin and endocrine therapy but chemotherapy toxicities may await the benefits.    Keep scheduled appointment with Dr. Conner   ECHO ordered to be done prior to next visit with Dr. Saab @ [facility]  KALYAN on 8/9/24 (ordered by PCP) showed osteopenia  RTC in 3 weeks with MD for definitive treatment options  Labs: CBC, CMP, CA 27-29  Continue follow ups with other  physicians    Encouraged to call for any questions or problems  The patient was given ample opportunity to ask questions and they were all answered to satisfaction; patient demonstrated understanding of what we discussed and is agreeable to the plan.     Adriana Kapadia MD  Hematology/Oncology      Professional Services   I, Marian Bruno LPN, acted solely as a scribe for and in the presence of Dr. Adriana Kapadia, who performed these services.

## 2024-10-22 ENCOUNTER — OFFICE VISIT (OUTPATIENT)
Dept: SURGICAL ONCOLOGY | Facility: CLINIC | Age: 84
End: 2024-10-22
Payer: MEDICARE

## 2024-10-22 VITALS — TEMPERATURE: 98 F | RESPIRATION RATE: 16 BRPM | DIASTOLIC BLOOD PRESSURE: 60 MMHG | SYSTOLIC BLOOD PRESSURE: 132 MMHG

## 2024-10-22 DIAGNOSIS — Z17.0 MALIGNANT NEOPLASM OF CENTRAL PORTION OF RIGHT BREAST IN FEMALE, ESTROGEN RECEPTOR POSITIVE: Primary | ICD-10-CM

## 2024-10-22 DIAGNOSIS — C50.111 MALIGNANT NEOPLASM OF CENTRAL PORTION OF RIGHT BREAST IN FEMALE, ESTROGEN RECEPTOR POSITIVE: Primary | ICD-10-CM

## 2024-10-22 PROCEDURE — 99024 POSTOP FOLLOW-UP VISIT: CPT | Mod: POP,,, | Performed by: SURGERY

## 2024-10-22 PROCEDURE — 99999 PR PBB SHADOW E&M-EST. PATIENT-LVL III: CPT | Mod: PBBFAC,,, | Performed by: SURGERY

## 2024-10-22 PROCEDURE — 99213 OFFICE O/P EST LOW 20 MIN: CPT | Mod: PBBFAC | Performed by: SURGERY

## 2024-10-22 NOTE — PROGRESS NOTES
Patient returns for postop visit after right lumpectomy and sentinel node.  She has no specific complaints.  No wound drainage, fever, or swelling.  Minor discomfort in the right axillary region.    On exam she appears well in no apparent distress   Right breast and axillary incisions are healing well without complication, no erythema, drainage or seroma formation.  Final pathology T2, N0, negative margins.  Pathology was discussed.    She is following up with Medical Oncology to discuss adjuvant therapy    Continue surveillance with Medical Oncology    Return to clinic as needed

## 2024-11-08 ENCOUNTER — OFFICE VISIT (OUTPATIENT)
Dept: HEMATOLOGY/ONCOLOGY | Facility: CLINIC | Age: 84
End: 2024-11-08
Payer: MEDICARE

## 2024-11-08 ENCOUNTER — LAB VISIT (OUTPATIENT)
Dept: LAB | Facility: HOSPITAL | Age: 84
End: 2024-11-08
Attending: INTERNAL MEDICINE
Payer: MEDICARE

## 2024-11-08 VITALS
TEMPERATURE: 98 F | HEART RATE: 71 BPM | RESPIRATION RATE: 18 BRPM | DIASTOLIC BLOOD PRESSURE: 73 MMHG | OXYGEN SATURATION: 95 % | WEIGHT: 167.5 LBS | SYSTOLIC BLOOD PRESSURE: 133 MMHG | HEIGHT: 64 IN | BODY MASS INDEX: 28.6 KG/M2

## 2024-11-08 DIAGNOSIS — C18.2 MALIGNANT NEOPLASM OF ASCENDING COLON: ICD-10-CM

## 2024-11-08 DIAGNOSIS — C50.111 MALIGNANT NEOPLASM OF CENTRAL PORTION OF RIGHT BREAST IN FEMALE, ESTROGEN RECEPTOR POSITIVE: ICD-10-CM

## 2024-11-08 DIAGNOSIS — Z17.31 HER2-POSITIVE CARCINOMA OF BREAST: ICD-10-CM

## 2024-11-08 DIAGNOSIS — Z79.4 TYPE 2 DIABETES MELLITUS WITHOUT COMPLICATION, WITH LONG-TERM CURRENT USE OF INSULIN: ICD-10-CM

## 2024-11-08 DIAGNOSIS — E11.9 TYPE 2 DIABETES MELLITUS WITHOUT COMPLICATION, WITH LONG-TERM CURRENT USE OF INSULIN: ICD-10-CM

## 2024-11-08 DIAGNOSIS — Z98.890 S/P LUMPECTOMY OF BREAST: ICD-10-CM

## 2024-11-08 DIAGNOSIS — E03.9 HYPOTHYROIDISM, UNSPECIFIED TYPE: ICD-10-CM

## 2024-11-08 DIAGNOSIS — C50.919 HER2-POSITIVE CARCINOMA OF BREAST: ICD-10-CM

## 2024-11-08 DIAGNOSIS — C50.111 MALIGNANT NEOPLASM OF CENTRAL PORTION OF RIGHT BREAST IN FEMALE, ESTROGEN RECEPTOR POSITIVE: Primary | ICD-10-CM

## 2024-11-08 DIAGNOSIS — Z17.0 MALIGNANT NEOPLASM OF CENTRAL PORTION OF RIGHT BREAST IN FEMALE, ESTROGEN RECEPTOR POSITIVE: ICD-10-CM

## 2024-11-08 DIAGNOSIS — Z17.0 MALIGNANT NEOPLASM OF CENTRAL PORTION OF RIGHT BREAST IN FEMALE, ESTROGEN RECEPTOR POSITIVE: Primary | ICD-10-CM

## 2024-11-08 LAB
ALBUMIN SERPL-MCNC: 3.9 G/DL (ref 3.4–4.8)
ALBUMIN/GLOB SERPL: 1.3 RATIO (ref 1.1–2)
ALP SERPL-CCNC: 42 UNIT/L (ref 40–150)
ALT SERPL-CCNC: 20 UNIT/L (ref 0–55)
ANION GAP SERPL CALC-SCNC: 8 MEQ/L
AST SERPL-CCNC: 30 UNIT/L (ref 5–34)
BASOPHILS # BLD AUTO: 0.03 X10(3)/MCL
BASOPHILS NFR BLD AUTO: 0.6 %
BILIRUB SERPL-MCNC: 0.8 MG/DL
BUN SERPL-MCNC: 17.6 MG/DL (ref 9.8–20.1)
CALCIUM SERPL-MCNC: 9.8 MG/DL (ref 8.4–10.2)
CHLORIDE SERPL-SCNC: 107 MMOL/L (ref 98–107)
CO2 SERPL-SCNC: 28 MMOL/L (ref 23–31)
CREAT SERPL-MCNC: 0.84 MG/DL (ref 0.55–1.02)
CREAT/UREA NIT SERPL: 21
EOSINOPHIL # BLD AUTO: 0.33 X10(3)/MCL (ref 0–0.9)
EOSINOPHIL NFR BLD AUTO: 6.5 %
ERYTHROCYTE [DISTWIDTH] IN BLOOD BY AUTOMATED COUNT: 13.7 % (ref 11.5–17)
GFR SERPLBLD CREATININE-BSD FMLA CKD-EPI: >60 ML/MIN/1.73/M2
GLOBULIN SER-MCNC: 3.1 GM/DL (ref 2.4–3.5)
GLUCOSE SERPL-MCNC: 97 MG/DL (ref 82–115)
HCT VFR BLD AUTO: 40.7 % (ref 37–47)
HGB BLD-MCNC: 13.3 G/DL (ref 12–16)
IMM GRANULOCYTES # BLD AUTO: 0.02 X10(3)/MCL (ref 0–0.04)
IMM GRANULOCYTES NFR BLD AUTO: 0.4 %
LYMPHOCYTES # BLD AUTO: 1.45 X10(3)/MCL (ref 0.6–4.6)
LYMPHOCYTES NFR BLD AUTO: 28.6 %
MCH RBC QN AUTO: 29 PG (ref 27–31)
MCHC RBC AUTO-ENTMCNC: 32.7 G/DL (ref 33–36)
MCV RBC AUTO: 88.7 FL (ref 80–94)
MONOCYTES # BLD AUTO: 0.46 X10(3)/MCL (ref 0.1–1.3)
MONOCYTES NFR BLD AUTO: 9.1 %
NEUTROPHILS # BLD AUTO: 2.78 X10(3)/MCL (ref 2.1–9.2)
NEUTROPHILS NFR BLD AUTO: 54.8 %
PLATELET # BLD AUTO: 129 X10(3)/MCL (ref 130–400)
PMV BLD AUTO: 10.9 FL (ref 7.4–10.4)
POTASSIUM SERPL-SCNC: 5 MMOL/L (ref 3.5–5.1)
PROT SERPL-MCNC: 7 GM/DL (ref 5.8–7.6)
RBC # BLD AUTO: 4.59 X10(6)/MCL (ref 4.2–5.4)
SODIUM SERPL-SCNC: 143 MMOL/L (ref 136–145)
WBC # BLD AUTO: 5.07 X10(3)/MCL (ref 4.5–11.5)

## 2024-11-08 PROCEDURE — 80053 COMPREHEN METABOLIC PANEL: CPT

## 2024-11-08 PROCEDURE — 86300 IMMUNOASSAY TUMOR CA 15-3: CPT

## 2024-11-08 PROCEDURE — 99215 OFFICE O/P EST HI 40 MIN: CPT | Mod: PBBFAC | Performed by: INTERNAL MEDICINE

## 2024-11-08 PROCEDURE — 85025 COMPLETE CBC W/AUTO DIFF WBC: CPT

## 2024-11-08 PROCEDURE — 99999 PR PBB SHADOW E&M-EST. PATIENT-LVL V: CPT | Mod: PBBFAC,,, | Performed by: INTERNAL MEDICINE

## 2024-11-08 PROCEDURE — 36415 COLL VENOUS BLD VENIPUNCTURE: CPT

## 2024-11-08 RX ORDER — METFORMIN HYDROCHLORIDE 1000 MG/1
1000 TABLET ORAL 2 TIMES DAILY WITH MEALS
Qty: 180 TABLET | Refills: 3 | Status: SHIPPED | OUTPATIENT
Start: 2024-11-08 | End: 2025-11-08

## 2024-11-08 RX ORDER — ESCITALOPRAM OXALATE 10 MG/1
10 TABLET ORAL DAILY
Qty: 90 TABLET | Refills: 0 | Status: SHIPPED | OUTPATIENT
Start: 2024-11-08

## 2024-11-08 RX ORDER — SODIUM CHLORIDE 0.9 % (FLUSH) 0.9 %
10 SYRINGE (ML) INJECTION
OUTPATIENT
Start: 2024-11-13

## 2024-11-08 RX ORDER — HEPARIN 100 UNIT/ML
500 SYRINGE INTRAVENOUS
OUTPATIENT
Start: 2024-11-13

## 2024-11-08 RX ORDER — LEVOTHYROXINE SODIUM 75 UG/1
75 TABLET ORAL DAILY
Qty: 90 TABLET | Refills: 1 | Status: SHIPPED | OUTPATIENT
Start: 2024-11-08

## 2024-11-08 RX ORDER — DIPHENHYDRAMINE HYDROCHLORIDE 50 MG/ML
50 INJECTION INTRAMUSCULAR; INTRAVENOUS ONCE AS NEEDED
OUTPATIENT
Start: 2024-11-13

## 2024-11-08 RX ORDER — LETROZOLE 2.5 MG/1
2.5 TABLET, FILM COATED ORAL DAILY
Qty: 30 TABLET | Refills: 2 | Status: SHIPPED | OUTPATIENT
Start: 2024-11-08

## 2024-11-08 RX ORDER — EPINEPHRINE 0.3 MG/.3ML
0.3 INJECTION SUBCUTANEOUS ONCE AS NEEDED
OUTPATIENT
Start: 2024-11-13

## 2024-11-08 RX ORDER — PRAVASTATIN SODIUM 20 MG/1
20 TABLET ORAL NIGHTLY
Qty: 90 TABLET | Refills: 3 | Status: SHIPPED | OUTPATIENT
Start: 2024-11-08

## 2024-11-08 NOTE — TELEPHONE ENCOUNTER
----- Message from Audrey sent at 11/8/2024  8:12 AM CST -----  Who Called: Wendie Ferreira    Refill or New Rx:Refill    RX Name and Strength:levothyroxine (SYNTHROID) 75 MCG tablet  RX Name and Strength:metFORMIN (GLUCOPHAGE) 1000 MG tablet  RX Name and Strength:pravastatin (PRAVACHOL) 20 MG tablet  RX Name and Strength:EScitalopram oxalate (LEXAPRO) 10 MG tablet  How is the patient currently taking it? (ex. 1XDay):  Is this a 30 day or 90 day RX:  Local or Mail Order:  List of preferred pharmacies on file (remove unneeded): [unfilled]  If different Pharmacy is requested, enter Pharmacy information here including location and phone number: [id number] AT [address]   Ordering Provider:      Patient's Preferred Phone Number on File: [phone]   Best Call Back Number, if different:    Additional Information: 90 day supply for all medications

## 2024-11-08 NOTE — PROGRESS NOTES
HEMATOLOGY/ONCOLOGY OFFICE CLINIC VISIT    Visit Information:    Initial Evaluation: 5/26/2021  Referring Provider: Dr. Robert Conner  Other providers:  Code status:  Not addressed    Diagnosis:  1) T2N0M0-Stage I Colon cancer. 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 Biopsy tubular adenoma with at least high-grade dysplasia.   -04/22/2021: Laparoscopic/robotic right hemicolectomy   -08/15/2024: Abnormal right breast mammogram BI-RADS category 4, suspicious  -08/26/2024: Ultrasound-guided right breast biopsy at 1:00 a.m. position--> invasive lobular carcinoma  -10/09/2024: s/p right lumpectomy with sentinel lymph node biopsy       Imaging:  CT A/P 3/17/2021 at [facility]: irregular colonic mass 5 to 6 cm in length proximal to midportion of descending colon.  Enhancing soft tissue component contacts and may invade the right lateral abdominal wall.  No regional or distant lymphadenopathy identified.  Cirrhotic liver with portal hypertension, small volume ascites and splenomegaly.  No focal lesion identified in the liver.    CT of the thorax 4/5/2021: no thoracic metastatic disease identified.  Small volume ascites and borderline splenomegaly.No adenopathy or distant metastases.  CT angiogram 5/20/2021: negative for pulmonary embolism but lungs demonstrate mild heterogeneous attenuation with subtle peripheral reticular opacities.  Primary differential consideration is small airways disease.  Findings may be secondary to multifocal infiltrate from infection versus pulmonary edema.  It also showed moderate ascites with increase in size since prior examination.  Liver with nodular contour and  was enlarged.  Spleen borderline enlarged.  A 1.3 x 1 cm enhancing nodule in the left adrenal gland no significant change.  Ultrasound of the abdomen 5/23/2021:cirrhosis of the liver with hepatosplenomegaly and ascites.   Ultrasound of the abdomen 10/26/2022: The pancreas appears grossly unremarkable.  No pancreatic mass or lesion is seen. liver is slightly enlarged in size. Liver is echogenic it measures 16 cm by my measurements..  No liver mass or lesion is seen. spleen appears enlarged and measures 14.5 cm.  Hepatomegaly with findings consistent with hepatic steatosis  CT C/A/P 2/16/2023:     1. No evidence of metastatic disease within the chest, abdomen and pelvis  2. Cirrhotic morphology of the liver  3. Changes of portal hypertension including borderline splenomegaly and portosystemic collateral  4. Mild interstitial scarring within the lungs.  CT C/A/P  8/28/2023:  No new suspicious findings chest, abdomen or pelvis.  Chronic findings above.  CT C/A/P 2/21/2024: No detrimental change identified since 08/28/2023.   CT 8/22/2024: Similar mild chronic changes in the lungs with no suspicious pulmonary nodule.  Moderate coronary artery calcification.  No pleural or pericardial effusion.  No pathologically enlarged thoracic lymph node. Cirrhosis with mild hepatic steatosis.  Similar mild splenomegaly.  Stable pancreas, adrenal glands and kidneys.  Very small hiatal hernia.  Normal caliber small bowel and colon with right mid abdominal ileal colonic anastomosis.  Distal colonic diverticulosis.  Normal bladder.  No ascites or abdominal/pelvic lymphadenopathy.  Aortoiliac atherosclerosis. No aggressive osseous lesion.No metastatic disease identified.     ECHO:  10/17/24, LVEF 60-65%`    Bone Density:  8/9/24 @ [facility]: Osteopenia      Pathology:  4/22/2022:   RIGHT COLON, HEMICOLECTOMY: ADENOCARCINOMA, WELL DIFFERENTIATED, 90% MUCINOUS.   --  Greatest tumor dimension, 7.0 cm (as measured grossly).  --  Adenocarcinoma  arises from tubular adenoma with high grade dysplasia.  --  Lymphovascular invasion, not identified.  --  Adenocarcinoma superficially invades muscularis propria.  --  Eighteen mesenteric lymph nodes, no neoplasm (0/18).  --  Surgical margins, uninvolved.  POSTOPERATIVE SPINDLE CELL PSEUDOTUMOR, 1.5 CM.  -  The pseudotumor extends into mesenteric adipose tissue.     9/24/2021:  LIVER, CORE NEEDLE BIOPSY: FOCAL PORTAL TRIADITIS WITH SINUS DILATION.   - NO INTERFACE ACTIVITY.   - BRIDING FIBROSIS (FIBROSIS STAGE  F2-3)  - NO STEATOSIS.  Comment: Despite hepatitis testing demonstrating reactivity for hepatitis A in July 2021, no evidence of acute hepatitis is identified and LFTs are currently within normal limits. This case has also been reviewed in intradepartmental consultation.    8/26/2024:  RIGHT BREAST, 1 O'CLOCK MASS ULTRASOUND GUIDED VACUUM BIOPSY:  - Invasive carcinoma with lobular features, Emerson Grade 3, see comment.  - Carcinoma involving 3 of 4 cores, 7 mm in longest linear extent.  ER 89.40%, GA 82.10%, HER2 equivocal, 2+, amplified by fish, Ki 67, high proliferation, 57.30%    10/9/2024:  1. BREAST, RIGHT, LUMPECTOMY:   PLEOMORPHIC LOBULAR CARCINOMA   DUCTAL CARCINOMA IN SITU WITH LOBULAR EXTENSION.   2. BREAST, SUPERIOR MARGIN, REEXCISION: NEGATIVE FOR INVASIVE AND IN SITU CARCINOMA.   3. AXILLA, ADDITIONAL AXILLA CONTENTS, EXCISION: TWO LYMPH NODES, NEGATIVE FOR METASTATIC CARCINOMA.   4. LYMPH NODE, RIGHT SENTINEL #1, EXCISION: ONE LYMPH NODE, NEGATIVE FOR METASTATIC CARCINOMA.   5. LYMPH NODE, RIGHT SENTINEL #2, EXCISION: ONE LYMPH NODE, NEGATIVE FOR METASTATIC CARCINOMA.   6. LYMPH NODES, RIGHT SENTINEL #3, EXCISION: TWO LYMPH NODES, NEGATIVE FOR METASTATIC CARCINOMA.     TUMOR Histologic Type:  Invasive carcinoma with features of : Pleomorphic lobular   Histologic Grade (Emerson Histologic Score):  Score 3   Tumor Size: 21 mm   Tumor Focality:  Single focus of invasive carcinoma   Ductal  Carcinoma In Situ (DCIS):  Present   Lymphovascular Invasion:  Cannot be determined: Suspicious for LVI   MARGINS:  All margins negative for invasive carcinoma   REGIONAL LYMPH NODES   Total Number of Lymph Nodes Examined (sentinel and non-sentinel):  Exact number : 5   Number of [place] Nodes Examined:  Exact number : 3      PATHOLOGIC STAGE CLASSIFICATION (pTNM, AJCC 8th Edition)   pT2pN0           CLINICAL HISTORY:       Patient: 82 year old female with multiple medical problems kindly referred for colon cancer.  She initially presented with anemia and fecal occult blood positive.  She also reports 20 pounds weight loss over the last previous 6 months. Colonoscopy on 3/15/2021 revealed a malignant partially obstructing tumor in the ascending colon. Biopsy showed tubular adenoma with at least high-grade dysplasia.  On 3/17/2021 she underwent a CT scan of the abdomen and pelvis at [facility] that showed an irregular colonic mass measuring about 5 to 6 cm in length involving proximal to midportion of descending colon.  Enhancing soft tissue component contacts and may invade the right lateral abdominal wall.  No regional or distant lymphadenopathy identified.  Cirrhotic liver with portal hypertension, small volume ascites and splenomegaly.  No focal lesion identified in the liver.  Staging CT of the thorax 4/5/2021 with no thoracic metastatic disease identified.  Small volume ascites and borderline splenomegaly.No adenopathy or distant metastases.     Patient was seen by Dr. Robert Conner and she underwent laparoscopic/robotic right hemicolectomy on 4/22/2021.  Pathology report as follows:   RIGHT COLON, HEMICOLECTOMY: ADENOCARCINOMA, WELL DIFFERENTIATED, 90% MUCINOUS. 0/18 LN positive for malignanacy.See above for details.    Patient recovered from the surgery but came back to the emergency department for chest pain. She underwent CT angiogram that was negative for pulmonary embolism but lungs demonstrate mild  heterogeneous attenuation with subtle peripheral reticular opacities.  Primary differential consideration is small airways disease.  Findings may be secondary to multifocal infiltrate from infection versus pulmonary edema.  It also showed moderate ascites with increase in size since prior examination.  Liver with nodular contour and was enlarged.  Spleen borderline enlarged.  1.3 x 1 cm enhancing nodule in the left adrenal gland no significant change.    She underwent ultrasound of the abdomen that confirmed the diagnosis of cirrhosis of the liver with hepatosplenomegaly and ascites.  She was discharged on 5/20/2021.    She stopped drinking at age 38, she says that she is a social drinker. She denies any family history of colon cancer. She denies any fever, chills, or sweats. No chest pain or shortness of breath.    She had liver biopsy ordered by Dr. Louis on 9/24/21. It was ordered for cirrhosis. She has no history of hepatitis infection or alcohol abuse. Biopsy was negative for malignancy. Just  fibrosis.              Chief Complaint: 3 Week Follow Up      Interval History:  Patient presents today for follow up to discuss treatment options, accompanied by her daughter. She underwent right breast lumpectomy with biopsy on 10/9/24 with Dr. Conner. She has healed well.  She is doing well, remains active. She has no complaints today.  Denies pain, bleeding, fever, chills, sweats.  No chest pain or shortness of breath.  She reports occ diarrhea. She continues to follow with Dr. Louis, GI, last colonoscopy 8/2023. Recent surveillance scans with [facility].  Treatment recommendations discussed with them in detail. All questions answered.  ECHO on 10/17/24, LVEF 60-65%.    ROS: All 14 points ROS taken and as per Interval History  Review of Systems   Constitutional:  Negative for chills, fever and weight loss.   HENT:  Negative for congestion and nosebleeds.    Eyes:  Negative for blurred vision, double vision and photophobia.  "  Respiratory:  Negative for cough, hemoptysis and shortness of breath.    Cardiovascular:  Negative for chest pain, palpitations, leg swelling and PND.   Gastrointestinal:  Positive for diarrhea. Negative for abdominal pain, blood in stool, constipation, melena, nausea and vomiting.   Genitourinary:  Negative for dysuria, frequency, hematuria and urgency.   Musculoskeletal:  Negative for back pain, falls and myalgias.   Skin:  Negative for itching and rash.   Neurological:  Negative for tremors, focal weakness, seizures, weakness and headaches.   Endo/Heme/Allergies:  Negative for environmental allergies. Does not bruise/bleed easily.   Psychiatric/Behavioral:  Negative for depression and suicidal ideas. The patient is nervous/anxious.          Histories:  PMH/PSH/FH/SOCIAL/ALLERGIES AND MEDS REVIEWED AND UPDATED AS APPROPRIATE       Vitals:    11/08/24 0939   BP: 133/73   BP Location: Left arm   Patient Position: Sitting   Pulse: 71   Resp: 18   Temp: 97.9 °F (36.6 °C)   TempSrc: Oral   SpO2: 95%   Weight: 76 kg (167 lb 8 oz)   Height: 5' 4" (1.626 m)             Wt Readings from Last 6 Encounters:   11/08/24 76 kg (167 lb 8 oz)   10/16/24 77.1 kg (169 lb 14.4 oz)   10/09/24 75.8 kg (167 lb 1.7 oz)   09/20/24 76.8 kg (169 lb 6.4 oz)   09/12/24 75.6 kg (166 lb 9.6 oz)   08/30/24 75.3 kg (166 lb)     Body mass index is 28.75 kg/m².  Body surface area is 1.85 meters squared.       Physical Exam  Constitutional:       Appearance: Normal appearance.   HENT:      Head: Normocephalic and atraumatic.   Eyes:      General: No scleral icterus.     Extraocular Movements: Extraocular movements intact.      Conjunctiva/sclera: Conjunctivae normal.   Neck:      Vascular: No JVD.   Cardiovascular:      Rate and Rhythm: Normal rate and regular rhythm.      Heart sounds: No murmur heard.  Pulmonary:      Effort: Pulmonary effort is normal.      Breath sounds: Normal breath sounds. No wheezing or rhonchi.   Chest:   Breasts:     " Right: Normal. No swelling, mass, nipple discharge, skin change or tenderness.      Left: No swelling, mass, nipple discharge, skin change or tenderness.      Comments: Surgical sites well healed  Abdominal:      General: Bowel sounds are normal. There is no distension.      Palpations: Abdomen is soft. There is no mass.      Tenderness: There is no abdominal tenderness.   Musculoskeletal:      Cervical back: Neck supple.   Lymphadenopathy:      Head:      Right side of head: No submental or submandibular adenopathy.      Left side of head: No submental or submandibular adenopathy.      Cervical: No cervical adenopathy.      Upper Body:      Right upper body: No supraclavicular or axillary adenopathy.      Left upper body: No supraclavicular or axillary adenopathy.      Lower Body: No right inguinal adenopathy. No left inguinal adenopathy.   Skin:     General: Skin is warm.      Coloration: Skin is not jaundiced.      Findings: No lesion or rash.      Nails: There is no clubbing.   Neurological:      Mental Status: She is alert and oriented to person, place, and time.      Cranial Nerves: Cranial nerves 2-12 are intact.   Psychiatric:         Attention and Perception: Attention normal.         Behavior: Behavior is cooperative.         Judgment: Judgment normal.       ECOG SCORE    1 - Restricted in strenuous activity-ambulatory and able to carry out work of a light nature         Laboratory:  CBC with Differential:  Lab Results   Component Value Date    WBC 5.07 11/08/2024    RBC 4.59 11/08/2024    HGB 13.3 11/08/2024    HCT 40.7 11/08/2024    MCV 88.7 11/08/2024    MCH 29.0 11/08/2024    MCHC 32.7 (L) 11/08/2024    RDW 13.7 11/08/2024     (L) 11/08/2024    MPV 10.9 (H) 11/08/2024        CMP:  Sodium   Date Value Ref Range Status   09/20/2024 144 136 - 145 mmol/L Final     Potassium   Date Value Ref Range Status   09/20/2024 4.9 3.5 - 5.1 mmol/L Final     Chloride   Date Value Ref Range Status   09/20/2024  105 98 - 107 mmol/L Final     CO2   Date Value Ref Range Status   09/20/2024 29 23 - 31 mmol/L Final     Blood Urea Nitrogen   Date Value Ref Range Status   09/20/2024 17.9 9.8 - 20.1 mg/dL Final     Creatinine   Date Value Ref Range Status   09/20/2024 0.80 0.55 - 1.02 mg/dL Final     Calcium   Date Value Ref Range Status   09/20/2024 10.5 (H) 8.4 - 10.2 mg/dL Final     Albumin   Date Value Ref Range Status   09/20/2024 4.0 3.4 - 4.8 g/dL Final     Bilirubin Total   Date Value Ref Range Status   09/20/2024 0.7 <=1.5 mg/dL Final     ALP   Date Value Ref Range Status   09/20/2024 47 40 - 150 unit/L Final     AST   Date Value Ref Range Status   09/20/2024 20 5 - 34 unit/L Final     ALT   Date Value Ref Range Status   09/20/2024 17 0 - 55 unit/L Final     Estimated GFR-Non    Date Value Ref Range Status   05/20/2022 >60 mls/min/1.73/m2 Final         Assessment:       1. Malignant neoplasm of central portion of right breast in female, estrogen receptor positive    2. HER2-positive carcinoma of breast    3. S/P lumpectomy of breast    4. Malignant neoplasm of ascending colon        1) pT2pN0M0--Stage I colon cancer--diagnosed in April 2021   -3/15/2021:Colonoscopy: malignant partially obstructing tumor in the ascending colon. Biopsy tubular adenoma with at least high-grade dysplasia.    -4/22/2021: Laparoscopic/robotic right hemicolectomy    -Path: Adenocarcinoma superficially invades muscularis propria. Eighteen mesenteric lymph nodes, no neoplasm (0/18).     NCCN guidelines (COL-3) for pathological stage T2, N0, M0   Surveillance (COL-8): colonoscopy in 1 year after surgery and then in 1 year for advanced adenoma, if no advanced adenoma then repeat in 3 yrs and then q 5 yrs.   CT C/A/P 8/22/2024 with [facility], stable benign findings    2) Invasive lobular carcinoma right breast Dx 8/26//2024  ---ER 89.40%, NC 82.10%, HER2 equivocal, 2+, amplified by fish, Ki 67, high proliferation, 57.30%   ---status post  right lumpectomy with sentinel lymph node biopsy on 10/9/2024--Invasive Pleomorphic lobular carcinoma   ---T2 N0 M0 stage IB, G3, ER/MD/HER2 positive    Review with her and daughter again today; diagnosis, prognosis and treatment recommendations according to NCCN guidelines.  Even though she is an elderly patient she will benefit of Herceptin base regimen followed by hormonal therapy once chemotherapy completed.  Because of her age recommend taxane with Herceptin.  Patient is adamant that she will not have chemotherapy or radiation therapy (not indicated at her age, also LN neg).  She does not opposed to try Herceptin and aromatase inhibitor.    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  These are benign lesions.    4) Liver disease--Cirrhosis of the liver with Splenomegaly and ascites. Followed by GI    5) Heart disease- s/p stent placement  --ECHO with normal EF 60-65%        Plan:       No clinical evidence of colon cancer recurrence.  CT C/A/P with [facility], stable benign findings. Patient with an abnormal mammogram for which she underwent right breast biopsy and pathology positive for Invasive lobular carcinoma. S/p lumpectomy - Stage ER/MD Her2 pos.   Discussed with the patient and her daughter diagnosis, prognosis and treatment recommendations according to staging and receptors.  She is 84 and with comorbidities.    Even though she is an elderly patient she will benefit of Herceptin base regimen followed by hormonal therapy once chemotherapy completed.  Because of her age recommend taxane (docetaxel or paclitaxel) with Herceptin. Will avoid Carboplatin or adriamycin.  Patient is adamant that she will not have chemotherapy or radiation  therapy.  She does not opposed to try Herceptin and aromatase inhibitor.    Plan to begin treatment with Herceptin every 3 weeks  Prolia every 6 months  Letrozole 2.5 mg daily, e-scribed  Continue follow ups with Dr. Conner   ECHO every 3 months - due Jan. 2025 with Dr. Saab @ [facility]  DEXA on 8/9/24 (ordered by PCP) showed osteopenia  RTC in 1 week with NP for lab/TD/Infusion same day  Patient education  Labs: CBC, CMP, CA 27-29  Continue follow ups with other physicians    Encouraged to call for any questions or problems  The patient was given ample opportunity to ask questions and they were all answered to satisfaction; patient demonstrated understanding of what we discussed and is agreeable to the plan.     Adriana Kapadia MD  Hematology/Oncology      Professional Services   I, Marian Bruno LPN, acted solely as a scribe for and in the presence of Dr. Adriana Kapadia, who performed these services.

## 2024-11-11 ENCOUNTER — OFFICE VISIT (OUTPATIENT)
Dept: HEMATOLOGY/ONCOLOGY | Facility: CLINIC | Age: 84
End: 2024-11-11
Payer: MEDICARE

## 2024-11-11 ENCOUNTER — CLINICAL SUPPORT (OUTPATIENT)
Dept: HEMATOLOGY/ONCOLOGY | Facility: CLINIC | Age: 84
End: 2024-11-11
Payer: MEDICARE

## 2024-11-11 VITALS
WEIGHT: 167.13 LBS | BODY MASS INDEX: 28.53 KG/M2 | SYSTOLIC BLOOD PRESSURE: 132 MMHG | HEIGHT: 64 IN | DIASTOLIC BLOOD PRESSURE: 71 MMHG | OXYGEN SATURATION: 20 % | TEMPERATURE: 98 F | HEART RATE: 77 BPM | RESPIRATION RATE: 18 BRPM

## 2024-11-11 DIAGNOSIS — Z17.0 MALIGNANT NEOPLASM OF CENTRAL PORTION OF RIGHT BREAST IN FEMALE, ESTROGEN RECEPTOR POSITIVE: Primary | ICD-10-CM

## 2024-11-11 DIAGNOSIS — Z17.0 MALIGNANT NEOPLASM OF CENTRAL PORTION OF RIGHT BREAST IN FEMALE, ESTROGEN RECEPTOR POSITIVE: ICD-10-CM

## 2024-11-11 DIAGNOSIS — Z17.31 HER2-POSITIVE CARCINOMA OF BREAST: ICD-10-CM

## 2024-11-11 DIAGNOSIS — C18.2 MALIGNANT NEOPLASM OF ASCENDING COLON: ICD-10-CM

## 2024-11-11 DIAGNOSIS — C50.919 HER2-POSITIVE CARCINOMA OF BREAST: ICD-10-CM

## 2024-11-11 DIAGNOSIS — C50.111 MALIGNANT NEOPLASM OF CENTRAL PORTION OF RIGHT BREAST IN FEMALE, ESTROGEN RECEPTOR POSITIVE: Primary | ICD-10-CM

## 2024-11-11 DIAGNOSIS — C50.111 MALIGNANT NEOPLASM OF CENTRAL PORTION OF RIGHT BREAST IN FEMALE, ESTROGEN RECEPTOR POSITIVE: ICD-10-CM

## 2024-11-11 LAB — CANCER AG27-29 SERPL-ACNC: 30.7 U/ML

## 2024-11-11 PROCEDURE — 99211 OFF/OP EST MAY X REQ PHY/QHP: CPT | Mod: PBBFAC,27

## 2024-11-11 PROCEDURE — 99999 PR PBB SHADOW E&M-EST. PATIENT-LVL V: CPT | Mod: PBBFAC,,, | Performed by: NURSE PRACTITIONER

## 2024-11-11 PROCEDURE — 99215 OFFICE O/P EST HI 40 MIN: CPT | Mod: PBBFAC | Performed by: NURSE PRACTITIONER

## 2024-11-11 PROCEDURE — 99999 PR PBB SHADOW E&M-EST. PATIENT-LVL I: CPT | Mod: PBBFAC,,,

## 2024-11-11 PROCEDURE — 99215 OFFICE O/P EST HI 40 MIN: CPT | Mod: S$PBB,,, | Performed by: NURSE PRACTITIONER

## 2024-11-11 NOTE — NURSING
Oncology Navigation   Intake  Cancer Type: GI  Initial Nurse Navigator Contact: 11/11/24     Treatment  Current Status: Active       Medical Oncologist: Steffi  Immunotherapy: Planned  Immunotherapy Name: BREAST TRASTUZUMAB Q3W                   Support Systems: Children     Acuity  Stage: 1  Systemic Treatment - predicted or initiated: Immunotherapy (+2)  Treatment Tolerability: Has not started treatment yet/treatment fully completed and side effects resolved  Comorbidities in Medical History: 1  Hospitalization Within the Past Month: 0   Needed: 0  Support: 0  Verbalizes Financial Concerns: 0  Transportation: 0  Mental Health: PHQ Score: 0 (Distress score 3/10)  History of noncompliance/frequent no shows and cancellations: 0  Verbalizes the need for more education: 0  Other Factors (+1 for Each): 0  Navigation Acuity: 3     Follow Up  No follow-ups on file.     Met with patient and daughter today via phone call following education visit.    Introduced self and role of navigation. Assessed for barriers to care. She denies anxiety/depression. She has no transportation concerns and her daughter is her main support system. Patient has no further questions or concerns today. They are aware of how to reach navigation should they need to.

## 2024-11-11 NOTE — PROGRESS NOTES
HEMATOLOGY/ONCOLOGY OFFICE CLINIC VISIT    Visit Information:    Initial Evaluation: 5/26/2021  Referring Provider: Dr. Robert Conner  Other providers:  Code status:  Not addressed    Diagnosis:  1) T2N0M0-Stage I Colon cancer. 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 Biopsy tubular adenoma with at least high-grade dysplasia.   -04/22/2021: Laparoscopic/robotic right hemicolectomy   -08/15/2024: Abnormal right breast mammogram BI-RADS category 4, suspicious  -08/26/2024: Ultrasound-guided right breast biopsy at 1:00 a.m. position--> invasive lobular carcinoma  -10/09/2024: s/p right lumpectomy with sentinel lymph node biopsy       Imaging:  CT A/P 3/17/2021 at [facility]: irregular colonic mass 5 to 6 cm in length proximal to midportion of descending colon.  Enhancing soft tissue component contacts and may invade the right lateral abdominal wall.  No regional or distant lymphadenopathy identified.  Cirrhotic liver with portal hypertension, small volume ascites and splenomegaly.  No focal lesion identified in the liver.    CT of the thorax 4/5/2021: no thoracic metastatic disease identified.  Small volume ascites and borderline splenomegaly.No adenopathy or distant metastases.  CT angiogram 5/20/2021: negative for pulmonary embolism but lungs demonstrate mild heterogeneous attenuation with subtle peripheral reticular opacities.  Primary differential consideration is small airways disease.  Findings may be secondary to multifocal infiltrate from infection versus pulmonary edema.  It also showed moderate ascites with increase in size since prior examination.  Liver with nodular contour and  was enlarged.  Spleen borderline enlarged.  A 1.3 x 1 cm enhancing nodule in the left adrenal gland no significant change.  Ultrasound of the abdomen 5/23/2021:cirrhosis of the liver with hepatosplenomegaly and ascites.   Ultrasound of the abdomen 10/26/2022: The pancreas appears grossly unremarkable.  No pancreatic mass or lesion is seen. liver is slightly enlarged in size. Liver is echogenic it measures 16 cm by my measurements..  No liver mass or lesion is seen. spleen appears enlarged and measures 14.5 cm.  Hepatomegaly with findings consistent with hepatic steatosis  CT C/A/P 2/16/2023:     1. No evidence of metastatic disease within the chest, abdomen and pelvis  2. Cirrhotic morphology of the liver  3. Changes of portal hypertension including borderline splenomegaly and portosystemic collateral  4. Mild interstitial scarring within the lungs.  CT C/A/P  8/28/2023:  No new suspicious findings chest, abdomen or pelvis.  Chronic findings above.  CT C/A/P 2/21/2024: No detrimental change identified since 08/28/2023.   CT 8/22/2024: Similar mild chronic changes in the lungs with no suspicious pulmonary nodule.  Moderate coronary artery calcification.  No pleural or pericardial effusion.  No pathologically enlarged thoracic lymph node. Cirrhosis with mild hepatic steatosis.  Similar mild splenomegaly.  Stable pancreas, adrenal glands and kidneys.  Very small hiatal hernia.  Normal caliber small bowel and colon with right mid abdominal ileal colonic anastomosis.  Distal colonic diverticulosis.  Normal bladder.  No ascites or abdominal/pelvic lymphadenopathy.  Aortoiliac atherosclerosis. No aggressive osseous lesion.No metastatic disease identified.     ECHO:  10/17/24, LVEF 60-65%`    Bone Density:  8/9/24 @ [facility]: Osteopenia      Pathology:  4/22/2022:   RIGHT COLON, HEMICOLECTOMY: ADENOCARCINOMA, WELL DIFFERENTIATED, 90% MUCINOUS.   --  Greatest tumor dimension, 7.0 cm (as measured grossly).  --  Adenocarcinoma  arises from tubular adenoma with high grade dysplasia.  --  Lymphovascular invasion, not identified.  --  Adenocarcinoma superficially invades muscularis propria.  --  Eighteen mesenteric lymph nodes, no neoplasm (0/18).  --  Surgical margins, uninvolved.  POSTOPERATIVE SPINDLE CELL PSEUDOTUMOR, 1.5 CM.  -  The pseudotumor extends into mesenteric adipose tissue.     9/24/2021:  LIVER, CORE NEEDLE BIOPSY: FOCAL PORTAL TRIADITIS WITH SINUS DILATION.   - NO INTERFACE ACTIVITY.   - BRIDING FIBROSIS (FIBROSIS STAGE  F2-3)  - NO STEATOSIS.  Comment: Despite hepatitis testing demonstrating reactivity for hepatitis A in July 2021, no evidence of acute hepatitis is identified and LFTs are currently within normal limits. This case has also been reviewed in intradepartmental consultation.    8/26/2024:  RIGHT BREAST, 1 O'CLOCK MASS ULTRASOUND GUIDED VACUUM BIOPSY:  - Invasive carcinoma with lobular features, Montrose Grade 3, see comment.  - Carcinoma involving 3 of 4 cores, 7 mm in longest linear extent.  ER 89.40%, NM 82.10%, HER2 equivocal, 2+, amplified by fish, Ki 67, high proliferation, 57.30%    10/9/2024:  1. BREAST, RIGHT, LUMPECTOMY:   PLEOMORPHIC LOBULAR CARCINOMA   DUCTAL CARCINOMA IN SITU WITH LOBULAR EXTENSION.   2. BREAST, SUPERIOR MARGIN, REEXCISION: NEGATIVE FOR INVASIVE AND IN SITU CARCINOMA.   3. AXILLA, ADDITIONAL AXILLA CONTENTS, EXCISION: TWO LYMPH NODES, NEGATIVE FOR METASTATIC CARCINOMA.   4. LYMPH NODE, RIGHT SENTINEL #1, EXCISION: ONE LYMPH NODE, NEGATIVE FOR METASTATIC CARCINOMA.   5. LYMPH NODE, RIGHT SENTINEL #2, EXCISION: ONE LYMPH NODE, NEGATIVE FOR METASTATIC CARCINOMA.   6. LYMPH NODES, RIGHT SENTINEL #3, EXCISION: TWO LYMPH NODES, NEGATIVE FOR METASTATIC CARCINOMA.     TUMOR Histologic Type:  Invasive carcinoma with features of : Pleomorphic lobular   Histologic Grade (Montrose Histologic Score):  Score 3   Tumor Size: 21 mm   Tumor Focality:  Single focus of invasive carcinoma   Ductal  Carcinoma In Situ (DCIS):  Present   Lymphovascular Invasion:  Cannot be determined: Suspicious for LVI   MARGINS:  All margins negative for invasive carcinoma   REGIONAL LYMPH NODES   Total Number of Lymph Nodes Examined (sentinel and non-sentinel):  Exact number : 5   Number of [place] Nodes Examined:  Exact number : 3      PATHOLOGIC STAGE CLASSIFICATION (pTNM, AJCC 8th Edition)   pT2pN0           CLINICAL HISTORY:       Patient: 82 year old female with multiple medical problems kindly referred for colon cancer.  She initially presented with anemia and fecal occult blood positive.  She also reports 20 pounds weight loss over the last previous 6 months. Colonoscopy on 3/15/2021 revealed a malignant partially obstructing tumor in the ascending colon. Biopsy showed tubular adenoma with at least high-grade dysplasia.  On 3/17/2021 she underwent a CT scan of the abdomen and pelvis at [facility] that showed an irregular colonic mass measuring about 5 to 6 cm in length involving proximal to midportion of descending colon.  Enhancing soft tissue component contacts and may invade the right lateral abdominal wall.  No regional or distant lymphadenopathy identified.  Cirrhotic liver with portal hypertension, small volume ascites and splenomegaly.  No focal lesion identified in the liver.  Staging CT of the thorax 4/5/2021 with no thoracic metastatic disease identified.  Small volume ascites and borderline splenomegaly.No adenopathy or distant metastases.     Patient was seen by Dr. Robert Conner and she underwent laparoscopic/robotic right hemicolectomy on 4/22/2021.  Pathology report as follows:   RIGHT COLON, HEMICOLECTOMY: ADENOCARCINOMA, WELL DIFFERENTIATED, 90% MUCINOUS. 0/18 LN positive for malignanacy.See above for details.    Patient recovered from the surgery but came back to the emergency department for chest pain. She underwent CT angiogram that was negative for pulmonary embolism but lungs demonstrate mild  heterogeneous attenuation with subtle peripheral reticular opacities.  Primary differential consideration is small airways disease.  Findings may be secondary to multifocal infiltrate from infection versus pulmonary edema.  It also showed moderate ascites with increase in size since prior examination.  Liver with nodular contour and was enlarged.  Spleen borderline enlarged.  1.3 x 1 cm enhancing nodule in the left adrenal gland no significant change.    She underwent ultrasound of the abdomen that confirmed the diagnosis of cirrhosis of the liver with hepatosplenomegaly and ascites.  She was discharged on 5/20/2021.    She stopped drinking at age 38, she says that she is a social drinker. She denies any family history of colon cancer. She denies any fever, chills, or sweats. No chest pain or shortness of breath.    She had liver biopsy ordered by Dr. Louis on 9/24/21. It was ordered for cirrhosis. She has no history of hepatitis infection or alcohol abuse. Biopsy was negative for malignancy. Just  fibrosis.              Chief Complaint: Patient Education      Interval History:  Patient presents today with her daughter for education visit. She had labs drawn on 11/8/24- unremarkable. She underwent right breast lumpectomy with biopsy on 10/9/24 with Dr. Conner. She has healed well.  She is doing well, remains active. Denies pain, bleeding, fever, chills, sweats.  No chest pain or shortness of breath.  She reports occ diarrhea. She had ECHO on 10/17/24, LVEF 60-65%.    ROS: All 14 points ROS taken and as per Interval History  Review of Systems   Respiratory:  Negative for cough and shortness of breath.    Cardiovascular:  Negative for chest pain.   Gastrointestinal:  Positive for diarrhea. Negative for abdominal pain.   Genitourinary:  Negative for frequency.   Musculoskeletal:  Negative for back pain.   Skin:  Negative for rash.   Neurological:  Negative for headaches.   Psychiatric/Behavioral:  The patient is  "nervous/anxious.          Histories:  PMH/PSH/FH/SOCIAL/ALLERGIES AND MEDS REVIEWED AND UPDATED AS APPROPRIATE       Vitals:    11/11/24 1019   BP: 132/71   BP Location: Left arm   Patient Position: Sitting   Pulse: 77   Resp: 18   Temp: 97.8 °F (36.6 °C)   TempSrc: Oral   SpO2: (!) 20%   Weight: 75.8 kg (167 lb 1.6 oz)   Height: 5' 4" (1.626 m)             Wt Readings from Last 6 Encounters:   11/11/24 75.8 kg (167 lb 1.6 oz)   11/08/24 76 kg (167 lb 8 oz)   10/16/24 77.1 kg (169 lb 14.4 oz)   10/09/24 75.8 kg (167 lb 1.7 oz)   09/20/24 76.8 kg (169 lb 6.4 oz)   09/12/24 75.6 kg (166 lb 9.6 oz)     Body mass index is 28.68 kg/m².  Body surface area is 1.85 meters squared.       Physical Exam  Constitutional:       Appearance: Normal appearance.   HENT:      Head: Normocephalic and atraumatic.   Eyes:      General: No scleral icterus.     Extraocular Movements: Extraocular movements intact.      Conjunctiva/sclera: Conjunctivae normal.   Neck:      Vascular: No JVD.   Cardiovascular:      Rate and Rhythm: Normal rate and regular rhythm.      Heart sounds: No murmur heard.  Pulmonary:      Effort: Pulmonary effort is normal.      Breath sounds: Normal breath sounds. No wheezing or rhonchi.   Chest:   Breasts:     Right: Normal. No swelling, mass, nipple discharge, skin change or tenderness.      Left: No swelling, mass, nipple discharge, skin change or tenderness.      Comments: Surgical sites well healed  Abdominal:      General: Bowel sounds are normal. There is no distension.      Palpations: Abdomen is soft. There is no mass.      Tenderness: There is no abdominal tenderness.   Musculoskeletal:      Cervical back: Neck supple.   Lymphadenopathy:      Head:      Right side of head: No submental or submandibular adenopathy.      Left side of head: No submental or submandibular adenopathy.      Cervical: No cervical adenopathy.      Upper Body:      Right upper body: No supraclavicular or axillary adenopathy.      " Left upper body: No supraclavicular or axillary adenopathy.      Lower Body: No right inguinal adenopathy. No left inguinal adenopathy.   Skin:     General: Skin is warm.      Coloration: Skin is not jaundiced.      Findings: No lesion or rash.      Nails: There is no clubbing.   Neurological:      Mental Status: She is alert and oriented to person, place, and time.      Cranial Nerves: Cranial nerves 2-12 are intact.   Psychiatric:         Attention and Perception: Attention normal.         Behavior: Behavior is cooperative.         Judgment: Judgment normal.     ECOG SCORE             Laboratory:  CBC with Differential:  Lab Results   Component Value Date    WBC 5.07 11/08/2024    RBC 4.59 11/08/2024    HGB 13.3 11/08/2024    HCT 40.7 11/08/2024    MCV 88.7 11/08/2024    MCH 29.0 11/08/2024    MCHC 32.7 (L) 11/08/2024    RDW 13.7 11/08/2024     (L) 11/08/2024    MPV 10.9 (H) 11/08/2024        CMP:  Sodium   Date Value Ref Range Status   11/08/2024 143 136 - 145 mmol/L Final     Potassium   Date Value Ref Range Status   11/08/2024 5.0 3.5 - 5.1 mmol/L Final     Chloride   Date Value Ref Range Status   11/08/2024 107 98 - 107 mmol/L Final     CO2   Date Value Ref Range Status   11/08/2024 28 23 - 31 mmol/L Final     Blood Urea Nitrogen   Date Value Ref Range Status   11/08/2024 17.6 9.8 - 20.1 mg/dL Final     Creatinine   Date Value Ref Range Status   11/08/2024 0.84 0.55 - 1.02 mg/dL Final     Calcium   Date Value Ref Range Status   11/08/2024 9.8 8.4 - 10.2 mg/dL Final     Albumin   Date Value Ref Range Status   11/08/2024 3.9 3.4 - 4.8 g/dL Final     Bilirubin Total   Date Value Ref Range Status   11/08/2024 0.8 <=1.5 mg/dL Final     ALP   Date Value Ref Range Status   11/08/2024 42 40 - 150 unit/L Final     AST   Date Value Ref Range Status   11/08/2024 30 5 - 34 unit/L Final     ALT   Date Value Ref Range Status   11/08/2024 20 0 - 55 unit/L Final     Estimated GFR-Non    Date Value Ref  Range Status   05/20/2022 >60 mls/min/1.73/m2 Final         Assessment:       1. Malignant neoplasm of central portion of right breast in female, estrogen receptor positive    2. HER2-positive carcinoma of breast    3. Malignant neoplasm of ascending colon        1) pT2pN0M0--Stage I colon cancer--diagnosed in April 2021   -3/15/2021:Colonoscopy: malignant partially obstructing tumor in the ascending colon. Biopsy tubular adenoma with at least high-grade dysplasia.    -4/22/2021: Laparoscopic/robotic right hemicolectomy    -Path: Adenocarcinoma superficially invades muscularis propria. Eighteen mesenteric lymph nodes, no neoplasm (0/18).     NCCN guidelines (COL-3) for pathological stage T2, N0, M0   Surveillance (COL-8): colonoscopy in 1 year after surgery and then in 1 year for advanced adenoma, if no advanced adenoma then repeat in 3 yrs and then q 5 yrs.   CT C/A/P 8/22/2024 with [facility], stable benign findings    2) Invasive lobular carcinoma right breast Dx 8/26//2024  ---ER 89.40%, [place] 82.10%, HER2 equivocal, 2+, amplified by fish, Ki 67, high proliferation, 57.30%   ---status post right lumpectomy with sentinel lymph node biopsy on 10/9/2024--Invasive Pleomorphic lobular carcinoma   ---T2 N0 M0 stage IB, G3, ER/[place]/HER2 positive    Review with her and daughter again today; diagnosis, prognosis and treatment recommendations according to NCCN guidelines.  Even though she is an elderly patient she will benefit of Herceptin base regimen followed by hormonal therapy once chemotherapy completed.  Because of her age recommend taxane with Herceptin.  Patient is adamant that she will not have chemotherapy or radiation therapy (not indicated at her age, also LN neg).  She does not opposed to try Herceptin and aromatase inhibitor.    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  These are benign lesions.    4) Liver disease--Cirrhosis of the liver with Splenomegaly and ascites. Followed by GI    5) Heart disease- s/p stent placement  --ECHO with normal EF 60-65%        Plan:       No clinical evidence of colon cancer recurrence.  CT C/A/P with [facility], stable benign findings. Patient with an abnormal mammogram for which she underwent right breast biopsy and pathology positive for Invasive lobular carcinoma. S/p lumpectomy - Stage ER/GA Her2 pos.   Discussed with the patient and her daughter diagnosis, prognosis and treatment recommendations according to staging and receptors.  She is 84 and with comorbidities.    Even though she is an elderly patient she will benefit of Herceptin base regimen followed by hormonal therapy once chemotherapy completed.  Because of her age recommend taxane (docetaxel or paclitaxel) with Herceptin. Will avoid Carboplatin or adriamycin.  Patient is adamant that she will not have chemotherapy or radiation therapy.  She does not opposed to try Herceptin and aromatase inhibitor.    Plan to begin treatment with Herceptin every 3 weeks  Prolia every 6 months  Letrozole 2.5 mg daily, e-scribed  Continue follow ups with Dr. Conner   ECHO every 3 months - due Jan. 2025 with Dr. Saab @ [facility]  KALYAN on 8/9/24 (ordered by PCP) showed osteopenia  Patient education provided today. Handouts given to patient.            ARUN Arreguin

## 2024-11-14 ENCOUNTER — INFUSION (OUTPATIENT)
Dept: INFUSION THERAPY | Facility: HOSPITAL | Age: 84
End: 2024-11-14
Attending: INTERNAL MEDICINE
Payer: MEDICARE

## 2024-11-14 VITALS
DIASTOLIC BLOOD PRESSURE: 68 MMHG | RESPIRATION RATE: 18 BRPM | HEIGHT: 64 IN | BODY MASS INDEX: 28.7 KG/M2 | WEIGHT: 168.13 LBS | HEART RATE: 78 BPM | TEMPERATURE: 98 F | OXYGEN SATURATION: 95 % | SYSTOLIC BLOOD PRESSURE: 112 MMHG

## 2024-11-14 DIAGNOSIS — Z17.31 HER2-POSITIVE CARCINOMA OF BREAST: Primary | ICD-10-CM

## 2024-11-14 DIAGNOSIS — C50.111 MALIGNANT NEOPLASM OF CENTRAL PORTION OF RIGHT BREAST IN FEMALE, ESTROGEN RECEPTOR POSITIVE: ICD-10-CM

## 2024-11-14 DIAGNOSIS — C50.919 HER2-POSITIVE CARCINOMA OF BREAST: Primary | ICD-10-CM

## 2024-11-14 DIAGNOSIS — Z17.0 MALIGNANT NEOPLASM OF CENTRAL PORTION OF RIGHT BREAST IN FEMALE, ESTROGEN RECEPTOR POSITIVE: ICD-10-CM

## 2024-11-14 DIAGNOSIS — M85.80 OSTEOPENIA, UNSPECIFIED LOCATION: ICD-10-CM

## 2024-11-14 PROCEDURE — 63600175 PHARM REV CODE 636 W HCPCS: Mod: JG | Performed by: INTERNAL MEDICINE

## 2024-11-14 PROCEDURE — 96413 CHEMO IV INFUSION 1 HR: CPT

## 2024-11-14 PROCEDURE — 63600175 PHARM REV CODE 636 W HCPCS: Mod: JZ,JG | Performed by: NURSE PRACTITIONER

## 2024-11-14 PROCEDURE — 25000003 PHARM REV CODE 250: Performed by: INTERNAL MEDICINE

## 2024-11-14 PROCEDURE — 96372 THER/PROPH/DIAG INJ SC/IM: CPT | Mod: 59

## 2024-11-14 RX ORDER — DIPHENHYDRAMINE HYDROCHLORIDE 50 MG/ML
50 INJECTION INTRAMUSCULAR; INTRAVENOUS ONCE AS NEEDED
Status: DISCONTINUED | OUTPATIENT
Start: 2024-11-14 | End: 2024-11-14 | Stop reason: HOSPADM

## 2024-11-14 RX ORDER — HEPARIN 100 UNIT/ML
500 SYRINGE INTRAVENOUS
Status: DISCONTINUED | OUTPATIENT
Start: 2024-11-14 | End: 2024-11-14 | Stop reason: HOSPADM

## 2024-11-14 RX ORDER — EPINEPHRINE 0.3 MG/.3ML
0.3 INJECTION SUBCUTANEOUS ONCE AS NEEDED
Status: DISCONTINUED | OUTPATIENT
Start: 2024-11-14 | End: 2024-11-14 | Stop reason: HOSPADM

## 2024-11-14 RX ORDER — SODIUM CHLORIDE 0.9 % (FLUSH) 0.9 %
10 SYRINGE (ML) INJECTION
Status: DISCONTINUED | OUTPATIENT
Start: 2024-11-14 | End: 2024-11-14 | Stop reason: HOSPADM

## 2024-11-14 RX ADMIN — SODIUM CHLORIDE: 9 INJECTION, SOLUTION INTRAVENOUS at 01:11

## 2024-11-14 RX ADMIN — TRASTUZUMAB-ANNS 608 MG: 420 INJECTION, POWDER, LYOPHILIZED, FOR SOLUTION INTRAVENOUS at 02:11

## 2024-11-14 RX ADMIN — DENOSUMAB 60 MG: 60 INJECTION SUBCUTANEOUS at 03:11

## 2024-11-14 NOTE — PLAN OF CARE
Plan of care reviewed with patient/family; both in agreement. C1 Kanjinti + #1 Prolia administered. Appts reviewed and printed for patient.

## 2024-12-04 ENCOUNTER — PATIENT MESSAGE (OUTPATIENT)
Dept: ADMINISTRATIVE | Facility: HOSPITAL | Age: 84
End: 2024-12-04
Payer: MEDICARE

## 2024-12-05 ENCOUNTER — OFFICE VISIT (OUTPATIENT)
Dept: HEMATOLOGY/ONCOLOGY | Facility: CLINIC | Age: 84
End: 2024-12-05
Payer: MEDICARE

## 2024-12-05 ENCOUNTER — INFUSION (OUTPATIENT)
Dept: INFUSION THERAPY | Facility: HOSPITAL | Age: 84
End: 2024-12-05
Attending: INTERNAL MEDICINE
Payer: MEDICARE

## 2024-12-05 VITALS
RESPIRATION RATE: 18 BRPM | OXYGEN SATURATION: 98 % | BODY MASS INDEX: 28.53 KG/M2 | TEMPERATURE: 98 F | HEART RATE: 69 BPM | SYSTOLIC BLOOD PRESSURE: 147 MMHG | WEIGHT: 167.13 LBS | HEIGHT: 64 IN | DIASTOLIC BLOOD PRESSURE: 74 MMHG

## 2024-12-05 VITALS — WEIGHT: 167.13 LBS | BODY MASS INDEX: 28.68 KG/M2

## 2024-12-05 DIAGNOSIS — Z17.0 MALIGNANT NEOPLASM OF CENTRAL PORTION OF RIGHT BREAST IN FEMALE, ESTROGEN RECEPTOR POSITIVE: ICD-10-CM

## 2024-12-05 DIAGNOSIS — C50.919 HER2-POSITIVE CARCINOMA OF BREAST: ICD-10-CM

## 2024-12-05 DIAGNOSIS — C50.111 MALIGNANT NEOPLASM OF CENTRAL PORTION OF RIGHT BREAST IN FEMALE, ESTROGEN RECEPTOR POSITIVE: ICD-10-CM

## 2024-12-05 DIAGNOSIS — C50.919 HER2-POSITIVE CARCINOMA OF BREAST: Primary | ICD-10-CM

## 2024-12-05 DIAGNOSIS — Z17.31 HER2-POSITIVE CARCINOMA OF BREAST: ICD-10-CM

## 2024-12-05 DIAGNOSIS — Z51.81 ENCOUNTER FOR MONITORING CARDIOTOXIC DRUG THERAPY: ICD-10-CM

## 2024-12-05 DIAGNOSIS — Z79.899 ENCOUNTER FOR MONITORING CARDIOTOXIC DRUG THERAPY: ICD-10-CM

## 2024-12-05 DIAGNOSIS — Z17.31 HER2-POSITIVE CARCINOMA OF BREAST: Primary | ICD-10-CM

## 2024-12-05 PROCEDURE — 99214 OFFICE O/P EST MOD 30 MIN: CPT | Mod: PBBFAC | Performed by: NURSE PRACTITIONER

## 2024-12-05 PROCEDURE — 99999 PR PBB SHADOW E&M-EST. PATIENT-LVL IV: CPT | Mod: PBBFAC,,, | Performed by: NURSE PRACTITIONER

## 2024-12-05 PROCEDURE — 63600175 PHARM REV CODE 636 W HCPCS: Mod: JG | Performed by: NURSE PRACTITIONER

## 2024-12-05 PROCEDURE — 96413 CHEMO IV INFUSION 1 HR: CPT

## 2024-12-05 PROCEDURE — 25000003 PHARM REV CODE 250: Performed by: NURSE PRACTITIONER

## 2024-12-05 RX ORDER — HEPARIN 100 UNIT/ML
500 SYRINGE INTRAVENOUS
Status: DISCONTINUED | OUTPATIENT
Start: 2024-12-05 | End: 2024-12-05 | Stop reason: HOSPADM

## 2024-12-05 RX ORDER — SODIUM CHLORIDE 0.9 % (FLUSH) 0.9 %
10 SYRINGE (ML) INJECTION
Status: CANCELLED | OUTPATIENT
Start: 2024-12-05

## 2024-12-05 RX ORDER — HEPARIN 100 UNIT/ML
500 SYRINGE INTRAVENOUS
Status: CANCELLED | OUTPATIENT
Start: 2024-12-05

## 2024-12-05 RX ORDER — DIPHENHYDRAMINE HYDROCHLORIDE 50 MG/ML
50 INJECTION INTRAMUSCULAR; INTRAVENOUS ONCE AS NEEDED
Status: DISCONTINUED | OUTPATIENT
Start: 2024-12-05 | End: 2024-12-05 | Stop reason: HOSPADM

## 2024-12-05 RX ORDER — SODIUM CHLORIDE 0.9 % (FLUSH) 0.9 %
10 SYRINGE (ML) INJECTION
Status: DISCONTINUED | OUTPATIENT
Start: 2024-12-05 | End: 2024-12-05 | Stop reason: HOSPADM

## 2024-12-05 RX ORDER — EPINEPHRINE 0.3 MG/.3ML
0.3 INJECTION SUBCUTANEOUS ONCE AS NEEDED
Status: CANCELLED | OUTPATIENT
Start: 2024-12-05

## 2024-12-05 RX ORDER — EPINEPHRINE 0.3 MG/.3ML
0.3 INJECTION SUBCUTANEOUS ONCE AS NEEDED
Status: DISCONTINUED | OUTPATIENT
Start: 2024-12-05 | End: 2024-12-05 | Stop reason: HOSPADM

## 2024-12-05 RX ORDER — DIPHENHYDRAMINE HYDROCHLORIDE 50 MG/ML
50 INJECTION INTRAMUSCULAR; INTRAVENOUS ONCE AS NEEDED
Status: CANCELLED | OUTPATIENT
Start: 2024-12-05

## 2024-12-05 RX ADMIN — TRASTUZUMAB-ANNS 456 MG: 420 INJECTION, POWDER, LYOPHILIZED, FOR SOLUTION INTRAVENOUS at 02:12

## 2024-12-05 NOTE — PROGRESS NOTES
HEMATOLOGY/ONCOLOGY OFFICE CLINIC VISIT    Visit Information:    Initial Evaluation: 5/26/2021  Referring Provider: Dr. Robert Conner  Other providers:  Code status:  Not addressed    Diagnosis:  1) T2N0M0-Stage I Colon cancer. 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 Biopsy tubular adenoma with at least high-grade dysplasia.   -04/22/2021: Laparoscopic/robotic right hemicolectomy   -08/15/2024: Abnormal right breast mammogram BI-RADS category 4, suspicious  -08/26/2024: Ultrasound-guided right breast biopsy at 1:00 a.m. position--> invasive lobular carcinoma  -10/09/2024: s/p right lumpectomy with sentinel lymph node biopsy       Imaging:  CT A/P 3/17/2021 at [facility]: irregular colonic mass 5 to 6 cm in length proximal to midportion of descending colon.  Enhancing soft tissue component contacts and may invade the right lateral abdominal wall.  No regional or distant lymphadenopathy identified.  Cirrhotic liver with portal hypertension, small volume ascites and splenomegaly.  No focal lesion identified in the liver.    CT of the thorax 4/5/2021: no thoracic metastatic disease identified.  Small volume ascites and borderline splenomegaly.No adenopathy or distant metastases.  CT angiogram 5/20/2021: negative for pulmonary embolism but lungs demonstrate mild heterogeneous attenuation with subtle peripheral reticular opacities.  Primary differential consideration is small airways disease.  Findings may be secondary to multifocal infiltrate from infection versus pulmonary edema.  It also showed moderate ascites with increase in size since prior examination.   Liver with nodular contour and was enlarged.  Spleen borderline enlarged.  A 1.3 x 1 cm enhancing nodule in the left adrenal gland no significant change.  Ultrasound of the abdomen 5/23/2021:cirrhosis of the liver with hepatosplenomegaly and ascites.   Ultrasound of the abdomen 10/26/2022: The pancreas appears grossly unremarkable.  No pancreatic mass or lesion is seen. liver is slightly enlarged in size. Liver is echogenic it measures 16 cm by my measurements..  No liver mass or lesion is seen. spleen appears enlarged and measures 14.5 cm.  Hepatomegaly with findings consistent with hepatic steatosis  CT C/A/P 2/16/2023:     1. No evidence of metastatic disease within the chest, abdomen and pelvis  2. Cirrhotic morphology of the liver  3. Changes of portal hypertension including borderline splenomegaly and portosystemic collateral  4. Mild interstitial scarring within the lungs.  CT C/A/P  8/28/2023:  No new suspicious findings chest, abdomen or pelvis.  Chronic findings above.  CT C/A/P 2/21/2024: No detrimental change identified since 08/28/2023.   CT 8/22/2024: Similar mild chronic changes in the lungs with no suspicious pulmonary nodule.  Moderate coronary artery calcification.  No pleural or pericardial effusion.  No pathologically enlarged thoracic lymph node. Cirrhosis with mild hepatic steatosis.  Similar mild splenomegaly.  Stable pancreas, adrenal glands and kidneys.  Very small hiatal hernia.  Normal caliber small bowel and colon with right mid abdominal ileal colonic anastomosis.  Distal colonic diverticulosis.  Normal bladder.  No ascites or abdominal/pelvic lymphadenopathy.  Aortoiliac atherosclerosis. No aggressive osseous lesion.No metastatic disease identified.     ECHO:  10/17/24, LVEF 60-65%`    Bone Density:  8/9/24 @ [facility]: Osteopenia      Pathology:  4/22/2022:   RIGHT COLON, HEMICOLECTOMY: ADENOCARCINOMA, WELL DIFFERENTIATED, 90% MUCINOUS.   --  Greatest tumor dimension, 7.0 cm (as measured  grossly).  --  Adenocarcinoma arises from tubular adenoma with high grade dysplasia.  --  Lymphovascular invasion, not identified.  --  Adenocarcinoma superficially invades muscularis propria.  --  Eighteen mesenteric lymph nodes, no neoplasm (0/18).  --  Surgical margins, uninvolved.  POSTOPERATIVE SPINDLE CELL PSEUDOTUMOR, 1.5 CM.  -  The pseudotumor extends into mesenteric adipose tissue.     9/24/2021:  LIVER, CORE NEEDLE BIOPSY: FOCAL PORTAL TRIADITIS WITH SINUS DILATION.   - NO INTERFACE ACTIVITY.   - BRIDING FIBROSIS (FIBROSIS STAGE  F2-3)  - NO STEATOSIS.  Comment: Despite hepatitis testing demonstrating reactivity for hepatitis A in July 2021, no evidence of acute hepatitis is identified and LFTs are currently within normal limits. This case has also been reviewed in intradepartmental consultation.    8/26/2024:  RIGHT BREAST, 1 O'CLOCK MASS ULTRASOUND GUIDED VACUUM BIOPSY:  - Invasive carcinoma with lobular features, [place] Grade 3, see comment.  - Carcinoma involving 3 of 4 cores, 7 mm in longest linear extent.  ER 89.40%, DC 82.10%, HER2 equivocal, 2+, amplified by fish, Ki 67, high proliferation, 57.30%    10/9/2024:  1. BREAST, RIGHT, LUMPECTOMY:   PLEOMORPHIC LOBULAR CARCINOMA   DUCTAL CARCINOMA IN SITU WITH LOBULAR EXTENSION.   2. BREAST, SUPERIOR MARGIN, REEXCISION: NEGATIVE FOR INVASIVE AND IN SITU CARCINOMA.   3. AXILLA, ADDITIONAL AXILLA CONTENTS, EXCISION: TWO LYMPH NODES, NEGATIVE FOR METASTATIC CARCINOMA.   4. LYMPH NODE, RIGHT SENTINEL #1, EXCISION: ONE LYMPH NODE, NEGATIVE FOR METASTATIC CARCINOMA.   5. LYMPH NODE, RIGHT SENTINEL #2, EXCISION: ONE LYMPH NODE, NEGATIVE FOR METASTATIC CARCINOMA.   6. LYMPH NODES, RIGHT SENTINEL #3, EXCISION: TWO LYMPH NODES, NEGATIVE FOR METASTATIC CARCINOMA.     TUMOR Histologic Type:  Invasive carcinoma with features of : Pleomorphic lobular   Histologic Grade (Xavier Histologic Score):  Score 3   Tumor Size: 21 mm   Tumor Focality:  Single focus of  invasive carcinoma   Ductal Carcinoma In Situ (DCIS):  Present   Lymphovascular Invasion:  Cannot be determined: Suspicious for LVI   MARGINS:  All margins negative for invasive carcinoma   REGIONAL LYMPH NODES   Total Number of Lymph Nodes Examined (sentinel and non-sentinel):  Exact number : 5   Number of [place] Nodes Examined:  Exact number : 3      PATHOLOGIC STAGE CLASSIFICATION (pTNM, AJCC 8th Edition)   pT2pN0           CLINICAL HISTORY:       Patient: 84 year old female with multiple medical problems kindly referred for colon cancer.  She initially presented with anemia and fecal occult blood positive.  She also reports 20 pounds weight loss over the last previous 6 months. Colonoscopy on 3/15/2021 revealed a malignant partially obstructing tumor in the ascending colon. Biopsy showed tubular adenoma with at least high-grade dysplasia.  On 3/17/2021 she underwent a CT scan of the abdomen and pelvis at [facility] that showed an irregular colonic mass measuring about 5 to 6 cm in length involving proximal to midportion of descending colon.  Enhancing soft tissue component contacts and may invade the right lateral abdominal wall.  No regional or distant lymphadenopathy identified.  Cirrhotic liver with portal hypertension, small volume ascites and splenomegaly.  No focal lesion identified in the liver.  Staging CT of the thorax 4/5/2021 with no thoracic metastatic disease identified.  Small volume ascites and borderline splenomegaly.No adenopathy or distant metastases.     Patient was seen by Dr. Robert Conner and she underwent laparoscopic/robotic right hemicolectomy on 4/22/2021.  Pathology report as follows:   RIGHT COLON, HEMICOLECTOMY: ADENOCARCINOMA, WELL DIFFERENTIATED, 90% MUCINOUS. 0/18 LN positive for malignanacy.See above for details.    Patient recovered from the surgery but came back to the emergency department for chest pain. She underwent CT angiogram that was negative for pulmonary  "embolism but lungs demonstrate mild heterogeneous attenuation with subtle peripheral reticular opacities.  Primary differential consideration is small airways disease.  Findings may be secondary to multifocal infiltrate from infection versus pulmonary edema.  It also showed moderate ascites with increase in size since prior examination.  Liver with nodular contour and was enlarged.  Spleen borderline enlarged.  1.3 x 1 cm enhancing nodule in the left adrenal gland no significant change.    She underwent ultrasound of the abdomen that confirmed the diagnosis of cirrhosis of the liver with hepatosplenomegaly and ascites.  She was discharged on 5/20/2021.    She stopped drinking at age 38, she says that she is a social drinker. She denies any family history of colon cancer. She denies any fever, chills, or sweats. No chest pain or shortness of breath.    She had liver biopsy ordered by Dr. Louis on 9/24/21. It was ordered for cirrhosis. She has no history of hepatitis infection or alcohol abuse. Biopsy was negative for malignancy. Just  fibrosis.              Chief Complaint: Follow-up      Interval History:  Patient presents today with her daughter for C2 of Herceptin. Tolerated C1 well . Did have headache the following morning after infusion. She has chronic diarrhea after colon surgery. Other than that, she is doing well, remains active. Denies pain, bleeding, fever, chills, sweats.  No chest pain or shortness of breath.  She had ECHO on 10/17/24, LVEF 60-65%.    ROS: All 14 points ROS taken and as per Interval History  Review of Systems   Gastrointestinal:  Positive for diarrhea.       Histories:  PMH/PSH/FH/SOCIAL/ALLERGIES AND MEDS REVIEWED AND UPDATED AS APPROPRIATE       Vitals:    12/05/24 1332   BP: (!) 147/74   BP Location: Left arm   Patient Position: Sitting   Pulse: 69   Resp: 18   Temp: 97.8 °F (36.6 °C)   TempSrc: Oral   SpO2: 98%   Weight: 75.8 kg (167 lb 1.6 oz)   Height: 5' 4" (1.626 m) "               Wt Readings from Last 6 Encounters:   12/05/24 75.8 kg (167 lb 1.6 oz)   11/14/24 76.2 kg (168 lb 1.6 oz)   11/11/24 75.8 kg (167 lb 1.6 oz)   11/08/24 76 kg (167 lb 8 oz)   10/16/24 77.1 kg (169 lb 14.4 oz)   10/09/24 75.8 kg (167 lb 1.7 oz)     Body mass index is 28.68 kg/m².  Body surface area is 1.85 meters squared.       Physical Exam  Constitutional:       Appearance: Normal appearance.   HENT:      Head: Normocephalic and atraumatic.   Eyes:      General: No scleral icterus.     Extraocular Movements: Extraocular movements intact.      Conjunctiva/sclera: Conjunctivae normal.   Neck:      Vascular: No JVD.   Cardiovascular:      Rate and Rhythm: Normal rate and regular rhythm.      Heart sounds: No murmur heard.  Pulmonary:      Effort: Pulmonary effort is normal.      Breath sounds: Normal breath sounds. No wheezing or rhonchi.   Chest:   Breasts:     Right: Normal. No swelling, mass, nipple discharge, skin change or tenderness.      Left: No swelling, mass, nipple discharge, skin change or tenderness.      Comments: Surgical sites well healed  Abdominal:      General: Bowel sounds are normal. There is no distension.      Palpations: Abdomen is soft. There is no mass.      Tenderness: There is no abdominal tenderness.   Musculoskeletal:      Cervical back: Neck supple.   Lymphadenopathy:      Head:      Right side of head: No submental or submandibular adenopathy.      Left side of head: No submental or submandibular adenopathy.      Cervical: No cervical adenopathy.      Upper Body:      Right upper body: No supraclavicular or axillary adenopathy.      Left upper body: No supraclavicular or axillary adenopathy.      Lower Body: No right inguinal adenopathy. No left inguinal adenopathy.   Skin:     General: Skin is warm.      Coloration: Skin is not jaundiced.      Findings: No lesion or rash.      Nails: There is no clubbing.   Neurological:      Mental Status: She is alert and oriented to  person, place, and time.      Cranial Nerves: Cranial nerves 2-12 are intact.   Psychiatric:         Attention and Perception: Attention normal.         Behavior: Behavior is cooperative.         Judgment: Judgment normal.     ECOG SCORE             Laboratory:  CBC with Differential:  Lab Results   Component Value Date    WBC 5.05 12/05/2024    RBC 4.47 12/05/2024    HGB 13.0 12/05/2024    HCT 39.9 12/05/2024    MCV 89.3 12/05/2024    MCH 29.1 12/05/2024    MCHC 32.6 (L) 12/05/2024    RDW 13.8 12/05/2024     (L) 12/05/2024    MPV 10.6 (H) 12/05/2024        CMP:  Sodium   Date Value Ref Range Status   12/05/2024 145 136 - 145 mmol/L Final     Potassium   Date Value Ref Range Status   12/05/2024 5.0 3.5 - 5.1 mmol/L Final     Chloride   Date Value Ref Range Status   12/05/2024 109 (H) 98 - 107 mmol/L Final     CO2   Date Value Ref Range Status   12/05/2024 29 23 - 31 mmol/L Final     Blood Urea Nitrogen   Date Value Ref Range Status   12/05/2024 16.8 9.8 - 20.1 mg/dL Final     Creatinine   Date Value Ref Range Status   12/05/2024 0.85 0.55 - 1.02 mg/dL Final     Calcium   Date Value Ref Range Status   12/05/2024 10.2 8.4 - 10.2 mg/dL Final     Albumin   Date Value Ref Range Status   12/05/2024 3.9 3.4 - 4.8 g/dL Final     Bilirubin Total   Date Value Ref Range Status   12/05/2024 0.6 <=1.5 mg/dL Final     ALP   Date Value Ref Range Status   12/05/2024 48 40 - 150 unit/L Final     AST   Date Value Ref Range Status   12/05/2024 23 5 - 34 unit/L Final     ALT   Date Value Ref Range Status   12/05/2024 21 0 - 55 unit/L Final     Estimated GFR-Non    Date Value Ref Range Status   05/20/2022 >60 mls/min/1.73/m2 Final         Assessment:       1. Malignant neoplasm of central portion of right breast in female, estrogen receptor positive    2. HER2-positive carcinoma of breast          1) pT2pN0M0--Stage I colon cancer--diagnosed in April 2021   -3/15/2021:Colonoscopy: malignant partially obstructing  tumor in the ascending colon. Biopsy tubular adenoma with at least high-grade dysplasia.    -4/22/2021: Laparoscopic/robotic right hemicolectomy    -Path: Adenocarcinoma superficially invades muscularis propria. Eighteen mesenteric lymph nodes, no neoplasm (0/18).     NCCN guidelines (COL-3) for pathological stage T2, N0, M0   Surveillance (COL-8): colonoscopy in 1 year after surgery and then in 1 year for advanced adenoma, if no advanced adenoma then repeat in 3 yrs and then q 5 yrs.   CT C/A/P 8/22/2024 with [facility], stable benign findings    2) Invasive lobular carcinoma right breast Dx 8/26//2024  ---ER 89.40%, [place] 82.10%, HER2 equivocal, 2+, amplified by fish, Ki 67, high proliferation, 57.30%   ---status post right lumpectomy with sentinel lymph node biopsy on 10/9/2024--Invasive Pleomorphic lobular carcinoma   ---T2 N0 M0 stage IB, G3, ER/IL/HER2 positive    Review with her and daughter again today; diagnosis, prognosis and treatment recommendations according to NCCN guidelines.  Even though she is an elderly patient she will benefit of Herceptin base regimen followed by hormonal therapy once chemotherapy completed.  Because of her age recommend taxane with Herceptin.  Patient is adamant that she will not have chemotherapy or radiation therapy (not indicated at her age, also LN neg).  She does not opposed to try Herceptin and aromatase inhibitor.    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  These are benign lesions.    4) Liver disease--Cirrhosis of the liver with Splenomegaly and ascites. Followed by GI    5) Heart disease- s/p stent placement  --ECHO with normal EF 60-65%        Plan:       No clinical evidence of colon cancer  recurrence.  CT C/A/P with [facility], stable benign findings. Patient with an abnormal mammogram for which she underwent right breast biopsy and pathology positive for Invasive lobular carcinoma. S/p lumpectomy - Stage ER/IA Her2 pos.   Discussed with the patient and her daughter diagnosis, prognosis and treatment recommendations according to staging and receptors.  She is 84 and with comorbidities.    Even though she is an elderly patient she will benefit of Herceptin base regimen followed by hormonal therapy once chemotherapy completed.  Because of her age recommend taxane (docetaxel or paclitaxel) with Herceptin. Will avoid Carboplatin or adriamycin.  Patient is adamant that she will not have chemotherapy or radiation therapy.  She does not opposed to try Herceptin and aromatase inhibitor.    May proceed with C2 of Herceptin every 3 weeks  Prolia every 6 months- next due in 5/2025  DEXA on 8/9/24 (ordered by PCP) showed osteopenia- will repeat in 8/2026.  Letrozole 2.5 mg daily  Continue follow ups with Dr. Conner   ECHO every 3 months - due Jan. 2025 with Dr. Saab @ [facility]- ordered.       RTC in 3 weeks for lab/TD/infusion same day.        ARUN Arreguin

## 2024-12-24 NOTE — PROGRESS NOTES
HEMATOLOGY/ONCOLOGY OFFICE CLINIC VISIT    Visit Information:    Initial Evaluation: 5/26/2021  Referring Provider: Dr. Robert Conner  Other providers:  Code status:  Not addressed    Diagnosis:  1) T2N0M0-Stage I Colon cancer. 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 Biopsy tubular adenoma with at least high-grade dysplasia.   -04/22/2021: Laparoscopic/robotic right hemicolectomy   -08/15/2024: Abnormal right breast mammogram BI-RADS category 4, suspicious  -08/26/2024: Ultrasound-guided right breast biopsy at 1:00 a.m. position--> invasive lobular carcinoma  -10/09/2024: s/p right lumpectomy with sentinel lymph node biopsy       Imaging:  CT A/P 3/17/2021 at [facility]: irregular colonic mass 5 to 6 cm in length proximal to midportion of descending colon.  Enhancing soft tissue component contacts and may invade the right lateral abdominal wall.  No regional or distant lymphadenopathy identified.  Cirrhotic liver with portal hypertension, small volume ascites and splenomegaly.  No focal lesion identified in the liver.    CT of the thorax 4/5/2021: no thoracic metastatic disease identified.  Small volume ascites and borderline splenomegaly.No adenopathy or distant metastases.  CT angiogram 5/20/2021: negative for pulmonary embolism but lungs demonstrate mild heterogeneous attenuation with subtle peripheral reticular opacities.  Primary differential consideration is small airways disease.  Findings may be secondary to multifocal infiltrate from infection versus pulmonary edema.  It also showed moderate ascites with increase in size since prior examination.  Liver with nodular contour and  was enlarged.  Spleen borderline enlarged.  A 1.3 x 1 cm enhancing nodule in the left adrenal gland no significant change.  Ultrasound of the abdomen 5/23/2021:cirrhosis of the liver with hepatosplenomegaly and ascites.   Ultrasound of the abdomen 10/26/2022: The pancreas appears grossly unremarkable.  No pancreatic mass or lesion is seen. liver is slightly enlarged in size. Liver is echogenic it measures 16 cm by my measurements..  No liver mass or lesion is seen. spleen appears enlarged and measures 14.5 cm.  Hepatomegaly with findings consistent with hepatic steatosis  CT C/A/P 2/16/2023:     1. No evidence of metastatic disease within the chest, abdomen and pelvis  2. Cirrhotic morphology of the liver  3. Changes of portal hypertension including borderline splenomegaly and portosystemic collateral  4. Mild interstitial scarring within the lungs.  CT C/A/P  8/28/2023:  No new suspicious findings chest, abdomen or pelvis.  Chronic findings above.  CT C/A/P 2/21/2024: No detrimental change identified since 08/28/2023.   CT 8/22/2024: Similar mild chronic changes in the lungs with no suspicious pulmonary nodule.  Moderate coronary artery calcification.  No pleural or pericardial effusion.  No pathologically enlarged thoracic lymph node. Cirrhosis with mild hepatic steatosis.  Similar mild splenomegaly.  Stable pancreas, adrenal glands and kidneys.  Very small hiatal hernia.  Normal caliber small bowel and colon with right mid abdominal ileal colonic anastomosis.  Distal colonic diverticulosis.  Normal bladder.  No ascites or abdominal/pelvic lymphadenopathy.  Aortoiliac atherosclerosis. No aggressive osseous lesion.No metastatic disease identified.     ECHO:  10/17/24, LVEF 60-65%`    Bone Density:  8/9/24 @ [facility]: Osteopenia      Pathology:  4/22/2022:   RIGHT COLON, HEMICOLECTOMY: ADENOCARCINOMA, WELL DIFFERENTIATED, 90% MUCINOUS.   --  Greatest tumor dimension, 7.0 cm (as measured grossly).  --  Adenocarcinoma  arises from tubular adenoma with high grade dysplasia.  --  Lymphovascular invasion, not identified.  --  Adenocarcinoma superficially invades muscularis propria.  --  Eighteen mesenteric lymph nodes, no neoplasm (0/18).  --  Surgical margins, uninvolved.  POSTOPERATIVE SPINDLE CELL PSEUDOTUMOR, 1.5 CM.  -  The pseudotumor extends into mesenteric adipose tissue.     9/24/2021:  LIVER, CORE NEEDLE BIOPSY: FOCAL PORTAL TRIADITIS WITH SINUS DILATION.   - NO INTERFACE ACTIVITY.   - BRIDING FIBROSIS (FIBROSIS STAGE  F2-3)  - NO STEATOSIS.  Comment: Despite hepatitis testing demonstrating reactivity for hepatitis A in July 2021, no evidence of acute hepatitis is identified and LFTs are currently within normal limits. This case has also been reviewed in intradepartmental consultation.    8/26/2024:  RIGHT BREAST, 1 O'CLOCK MASS ULTRASOUND GUIDED VACUUM BIOPSY:  - Invasive carcinoma with lobular features, [place] Grade 3, see comment.  - Carcinoma involving 3 of 4 cores, 7 mm in longest linear extent.  ER 89.40%, CT 82.10%, HER2 equivocal, 2+, amplified by fish, Ki 67, high proliferation, 57.30%    10/9/2024:  1. BREAST, RIGHT, LUMPECTOMY:   PLEOMORPHIC LOBULAR CARCINOMA   DUCTAL CARCINOMA IN SITU WITH LOBULAR EXTENSION.   2. BREAST, SUPERIOR MARGIN, REEXCISION: NEGATIVE FOR INVASIVE AND IN SITU CARCINOMA.   3. AXILLA, ADDITIONAL AXILLA CONTENTS, EXCISION: TWO LYMPH NODES, NEGATIVE FOR METASTATIC CARCINOMA.   4. LYMPH NODE, RIGHT SENTINEL #1, EXCISION: ONE LYMPH NODE, NEGATIVE FOR METASTATIC CARCINOMA.   5. LYMPH NODE, RIGHT SENTINEL #2, EXCISION: ONE LYMPH NODE, NEGATIVE FOR METASTATIC CARCINOMA.   6. LYMPH NODES, RIGHT SENTINEL #3, EXCISION: TWO LYMPH NODES, NEGATIVE FOR METASTATIC CARCINOMA.     TUMOR Histologic Type:  Invasive carcinoma with features of : Pleomorphic lobular   Histologic Grade ([place] Histologic Score):  Score 3   Tumor Size: 21 mm   Tumor Focality:  Single focus of invasive carcinoma   Ductal  Carcinoma In Situ (DCIS):  Present   Lymphovascular Invasion:  Cannot be determined: Suspicious for LVI   MARGINS:  All margins negative for invasive carcinoma   REGIONAL LYMPH NODES   Total Number of Lymph Nodes Examined (sentinel and non-sentinel):  Exact number : 5   Number of [place] Nodes Examined:  Exact number : 3      PATHOLOGIC STAGE CLASSIFICATION (pTNM, AJCC 8th Edition)   pT2pN0           CLINICAL HISTORY:       Patient: 82 year old female with multiple medical problems kindly referred for colon cancer.  She initially presented with anemia and fecal occult blood positive.  She also reports 20 pounds weight loss over the last previous 6 months. Colonoscopy on 3/15/2021 revealed a malignant partially obstructing tumor in the ascending colon. Biopsy showed tubular adenoma with at least high-grade dysplasia.  On 3/17/2021 she underwent a CT scan of the abdomen and pelvis at [facility] that showed an irregular colonic mass measuring about 5 to 6 cm in length involving proximal to midportion of descending colon.  Enhancing soft tissue component contacts and may invade the right lateral abdominal wall.  No regional or distant lymphadenopathy identified.  Cirrhotic liver with portal hypertension, small volume ascites and splenomegaly.  No focal lesion identified in the liver.  Staging CT of the thorax 4/5/2021 with no thoracic metastatic disease identified.  Small volume ascites and borderline splenomegaly.No adenopathy or distant metastases.   Patient was seen by Dr. Robert Conner and she underwent laparoscopic/robotic right hemicolectomy on 4/22/2021.  Pathology report as follows:   RIGHT COLON, HEMICOLECTOMY: ADENOCARCINOMA, WELL DIFFERENTIATED, 90% MUCINOUS. 0/18 LN positive for malignanacy.See above for details.  Patient recovered from the surgery but came back to the emergency department for chest pain. She underwent CT angiogram that was negative for pulmonary embolism but lungs demonstrate mild  heterogeneous attenuation with subtle peripheral reticular opacities.  Primary differential consideration is small airways disease.  Findings may be secondary to multifocal infiltrate from infection versus pulmonary edema.  It also showed moderate ascites with increase in size since prior examination.  Liver with nodular contour and was enlarged.  Spleen borderline enlarged.  1.3 x 1 cm enhancing nodule in the left adrenal gland no significant change.  She underwent ultrasound of the abdomen that confirmed the diagnosis of cirrhosis of the liver with hepatosplenomegaly and ascites.  She was discharged on 5/20/2021.  She stopped drinking at age 38, she says that she is a social drinker. She denies any family history of colon cancer. She denies any fever, chills, or sweats. No chest pain or shortness of breath.  She had liver biopsy ordered by Dr. Louis on 9/24/21. It was ordered for cirrhosis. She has no history of hepatitis infection or alcohol abuse. Biopsy was negative for malignancy. Just  fibrosis.              Chief Complaint: 3 Week Follow Up      Interval History:  Patient presents today for follow up. She underwent right breast lumpectomy with biopsy on 10/9/24 with Dr. Conner. She has healed well.  She is doing well, remains active. She complains of diarrhea since colon resection. Anti-diarrheals are not helping.   ECHO on 10/17/24, LVEF 60-65%.  Herceptin and letrozole and so far tolerating treatment well.  Echo was repeated few weeks ago in again with normal ejection fraction.  She will have it every 3 months while on Herceptin.    ROS: All 14 points ROS taken and as per Interval History  Review of Systems   Constitutional:  Negative for chills, fever and weight loss.   HENT:  Negative for congestion and nosebleeds.    Eyes:  Negative for blurred vision, double vision and photophobia.   Respiratory:  Negative for cough, hemoptysis and shortness of breath.    Cardiovascular:  Negative for chest pain,  "palpitations, leg swelling and PND.   Gastrointestinal:  Positive for diarrhea. Negative for abdominal pain, blood in stool, constipation, melena, nausea and vomiting.   Genitourinary:  Negative for dysuria, frequency, hematuria and urgency.   Musculoskeletal:  Negative for back pain, falls and myalgias.   Skin:  Negative for itching and rash.   Neurological:  Negative for tremors, focal weakness, seizures, weakness and headaches.   Endo/Heme/Allergies:  Negative for environmental allergies. Does not bruise/bleed easily.   Psychiatric/Behavioral:  Negative for depression and suicidal ideas. The patient is nervous/anxious.          Histories:  PMH/PSH/FH/SOCIAL/ALLERGIES AND MEDS REVIEWED AND UPDATED AS APPROPRIATE       Vitals:    12/26/24 1257   BP: 129/64   BP Location: Left arm   Patient Position: Sitting   Pulse: 69   Resp: 18   Temp: 98.3 °F (36.8 °C)   TempSrc: Oral   SpO2: 97%   Weight: 76.3 kg (168 lb 4.8 oz)   Height: 5' 4" (1.626 m)               Wt Readings from Last 6 Encounters:   12/26/24 76.3 kg (168 lb 4.8 oz)   12/05/24 75.8 kg (167 lb 1.7 oz)   12/05/24 75.8 kg (167 lb 1.6 oz)   11/14/24 76.2 kg (168 lb 1.6 oz)   11/11/24 75.8 kg (167 lb 1.6 oz)   11/08/24 76 kg (167 lb 8 oz)     Body mass index is 28.89 kg/m².  Body surface area is 1.86 meters squared.       Physical Exam  Constitutional:       Appearance: Normal appearance.   HENT:      Head: Normocephalic and atraumatic.   Eyes:      General: No scleral icterus.     Extraocular Movements: Extraocular movements intact.      Conjunctiva/sclera: Conjunctivae normal.   Neck:      Vascular: No JVD.   Cardiovascular:      Rate and Rhythm: Normal rate and regular rhythm.      Heart sounds: No murmur heard.  Pulmonary:      Effort: Pulmonary effort is normal.      Breath sounds: Normal breath sounds. No wheezing or rhonchi.   Chest:   Breasts:     Right: Normal. No swelling, mass, nipple discharge, skin change or tenderness.      Left: No swelling, " mass, nipple discharge, skin change or tenderness.      Comments: Surgical sites well healed  Abdominal:      General: Bowel sounds are normal. There is no distension.      Palpations: Abdomen is soft. There is no mass.      Tenderness: There is no abdominal tenderness.   Musculoskeletal:      Cervical back: Neck supple.   Lymphadenopathy:      Head:      Right side of head: No submental or submandibular adenopathy.      Left side of head: No submental or submandibular adenopathy.      Cervical: No cervical adenopathy.      Upper Body:      Right upper body: No supraclavicular or axillary adenopathy.      Left upper body: No supraclavicular or axillary adenopathy.      Lower Body: No right inguinal adenopathy. No left inguinal adenopathy.   Skin:     General: Skin is warm.      Coloration: Skin is not jaundiced.      Findings: No lesion or rash.      Nails: There is no clubbing.   Neurological:      Mental Status: She is alert and oriented to person, place, and time.      Cranial Nerves: Cranial nerves 2-12 are intact.   Psychiatric:         Attention and Perception: Attention normal.         Behavior: Behavior is cooperative.         Judgment: Judgment normal.       ECOG SCORE             Laboratory:  CBC with Differential:  Lab Results   Component Value Date    WBC 3.89 (L) 12/26/2024    RBC 4.20 12/26/2024    HGB 12.1 12/26/2024    HCT 37.4 12/26/2024    MCV 89.0 12/26/2024    MCH 28.8 12/26/2024    MCHC 32.4 (L) 12/26/2024    RDW 13.8 12/26/2024     (L) 12/26/2024    MPV 11.0 (H) 12/26/2024        CMP:  Sodium   Date Value Ref Range Status   12/26/2024 140 136 - 145 mmol/L Final     Potassium   Date Value Ref Range Status   12/26/2024 3.8 3.5 - 5.1 mmol/L Final     Chloride   Date Value Ref Range Status   12/26/2024 107 98 - 107 mmol/L Final     CO2   Date Value Ref Range Status   12/26/2024 23 23 - 31 mmol/L Final     Blood Urea Nitrogen   Date Value Ref Range Status   12/26/2024 17.0 9.8 - 20.1 mg/dL  Final     Creatinine   Date Value Ref Range Status   12/26/2024 0.72 0.55 - 1.02 mg/dL Final     Calcium   Date Value Ref Range Status   12/26/2024 8.9 8.4 - 10.2 mg/dL Final     Albumin   Date Value Ref Range Status   12/26/2024 3.6 3.4 - 4.8 g/dL Final     Bilirubin Total   Date Value Ref Range Status   12/26/2024 0.6 <=1.5 mg/dL Final     ALP   Date Value Ref Range Status   12/26/2024 48 40 - 150 unit/L Final     AST   Date Value Ref Range Status   12/26/2024 22 5 - 34 unit/L Final     ALT   Date Value Ref Range Status   12/26/2024 17 0 - 55 unit/L Final     Estimated GFR-Non    Date Value Ref Range Status   05/20/2022 >60 mls/min/1.73/m2 Final         Assessment:       1. Malignant neoplasm of descending colon    2. Malignant neoplasm of central portion of right breast in female, estrogen receptor positive    3. S/P lumpectomy of breast    4. HER2-positive carcinoma of breast    5. Osteopenia, unspecified location    6. Leukopenia, unspecified type    7. Thrombocytopenia    8. Splenomegaly    9. Other abnormal tumor markers          1) pT2pN0M0--Stage I colon cancer--diagnosed in April 2021   -3/15/2021:Colonoscopy: malignant partially obstructing tumor in the ascending colon. Biopsy tubular adenoma with at least high-grade dysplasia.    -4/22/2021: Laparoscopic/robotic right hemicolectomy    -Path: Adenocarcinoma superficially invades muscularis propria. Eighteen mesenteric lymph nodes, no neoplasm (0/18).     NCCN guidelines (COL-3) for pathological stage T2, N0, M0   Surveillance (COL-8): colonoscopy in 1 year after surgery and then in 1 year for advanced adenoma, if no advanced adenoma then repeat in 3 yrs and then q 5 yrs.   CT C/A/P 8/22/2024 with [facility], stable benign findings    2) Invasive lobular carcinoma right breast Dx 8/26//2024  ---ER 89.40%, IN 82.10%, HER2 equivocal, 2+, amplified by fish, Ki 67, high proliferation, 57.30%   ---status post right lumpectomy with sentinel lymph node  biopsy on 10/9/2024--Invasive Pleomorphic lobular carcinoma   ---T2 N0 M0 stage IB, G3, ER/MA/HER2 positive    Review with her and daughter again today; diagnosis, prognosis and treatment recommendations according to NCCN guidelines.  Even though she is an elderly patient she will benefit of Herceptin base regimen followed by hormonal therapy once chemotherapy completed.  Because of her age recommend taxane with Herceptin.  Patient is adamant that she will not have chemotherapy or radiation therapy (not indicated at her age, also LN neg).  She does not opposed to try Herceptin and aromatase inhibitor.    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  These are benign lesions.    4) Liver disease--Cirrhosis of the liver with Splenomegaly and ascites. Followed by GI    5) Heart disease- s/p stent placement  --ECHO with normal EF 60-65%        Plan:       No clinical evidence of colon cancer recurrence.  CT C/A/P with [facility], stable benign findings. Patient with an abnormal mammogram for which she underwent right breast biopsy and pathology positive for Invasive lobular carcinoma. S/p lumpectomy - Stage ER/MA Her2 pos.   Discussed with the patient and her daughter diagnosis, prognosis and treatment recommendations according to staging and receptors.  She is 84 and with comorbidities.    Even though she is an elderly patient she will benefit of Herceptin base regimen followed by hormonal therapy once chemotherapy completed.  Because of her age recommend taxane (docetaxel or paclitaxel) with Herceptin. Will avoid Carboplatin or adriamycin.  Patient is adamant that she will not have chemotherapy or radiation therapy.  She does not opposed to try  Herceptin and aromatase inhibitor.  So far tolerating treatment well we will continue present management    Plan to begin treatment with Herceptin every 3 weeks  Prolia every 6 months  Continue follow ups with Dr. Conner   ECHO every 3 months - last was on 12/18/24 with Dr. Saab @ [facility]  Labs: CBC, CMP, CA 27-29  Continue follow ups with other physicians  Ok to proceed with Herceptin C4 today  RTC 3 weeks with labs same day    Encouraged to call for any questions or problems  The patient was given ample opportunity to ask questions and they were all answered to satisfaction; patient demonstrated understanding of what we discussed and is agreeable to the plan.     Adriana Kapadia MD  Hematology/Oncology      Professional Services   I, Marifer Pierson LPN, acted solely as a scribe for and in the presence of Dr. Adriana Kapadia, who performed these services.

## 2024-12-26 ENCOUNTER — INFUSION (OUTPATIENT)
Dept: INFUSION THERAPY | Facility: HOSPITAL | Age: 84
End: 2024-12-26
Attending: INTERNAL MEDICINE
Payer: MEDICARE

## 2024-12-26 ENCOUNTER — LAB VISIT (OUTPATIENT)
Dept: LAB | Facility: HOSPITAL | Age: 84
End: 2024-12-26
Attending: INTERNAL MEDICINE
Payer: MEDICARE

## 2024-12-26 ENCOUNTER — OFFICE VISIT (OUTPATIENT)
Dept: HEMATOLOGY/ONCOLOGY | Facility: CLINIC | Age: 84
End: 2024-12-26
Payer: MEDICARE

## 2024-12-26 VITALS
BODY MASS INDEX: 28.73 KG/M2 | SYSTOLIC BLOOD PRESSURE: 129 MMHG | RESPIRATION RATE: 18 BRPM | OXYGEN SATURATION: 97 % | TEMPERATURE: 98 F | HEIGHT: 64 IN | WEIGHT: 168.31 LBS | DIASTOLIC BLOOD PRESSURE: 64 MMHG | HEART RATE: 69 BPM

## 2024-12-26 DIAGNOSIS — Z17.0 MALIGNANT NEOPLASM OF CENTRAL PORTION OF RIGHT BREAST IN FEMALE, ESTROGEN RECEPTOR POSITIVE: ICD-10-CM

## 2024-12-26 DIAGNOSIS — M85.80 OSTEOPENIA, UNSPECIFIED LOCATION: ICD-10-CM

## 2024-12-26 DIAGNOSIS — C18.6 MALIGNANT NEOPLASM OF DESCENDING COLON: Primary | ICD-10-CM

## 2024-12-26 DIAGNOSIS — C50.111 MALIGNANT NEOPLASM OF CENTRAL PORTION OF RIGHT BREAST IN FEMALE, ESTROGEN RECEPTOR POSITIVE: ICD-10-CM

## 2024-12-26 DIAGNOSIS — C50.919 HER2-POSITIVE CARCINOMA OF BREAST: ICD-10-CM

## 2024-12-26 DIAGNOSIS — C50.919 HER2-POSITIVE CARCINOMA OF BREAST: Primary | ICD-10-CM

## 2024-12-26 DIAGNOSIS — D69.6 THROMBOCYTOPENIA: ICD-10-CM

## 2024-12-26 DIAGNOSIS — Z17.31 HER2-POSITIVE CARCINOMA OF BREAST: Primary | ICD-10-CM

## 2024-12-26 DIAGNOSIS — Z17.31 HER2-POSITIVE CARCINOMA OF BREAST: ICD-10-CM

## 2024-12-26 DIAGNOSIS — Z98.890 S/P LUMPECTOMY OF BREAST: ICD-10-CM

## 2024-12-26 DIAGNOSIS — C18.2 MALIGNANT NEOPLASM OF ASCENDING COLON: ICD-10-CM

## 2024-12-26 DIAGNOSIS — R16.1 SPLENOMEGALY: ICD-10-CM

## 2024-12-26 DIAGNOSIS — R97.8 OTHER ABNORMAL TUMOR MARKERS: ICD-10-CM

## 2024-12-26 DIAGNOSIS — D72.819 LEUKOPENIA, UNSPECIFIED TYPE: ICD-10-CM

## 2024-12-26 LAB
ALBUMIN SERPL-MCNC: 3.6 G/DL (ref 3.4–4.8)
ALBUMIN/GLOB SERPL: 1.2 RATIO (ref 1.1–2)
ALP SERPL-CCNC: 48 UNIT/L (ref 40–150)
ALT SERPL-CCNC: 17 UNIT/L (ref 0–55)
ANION GAP SERPL CALC-SCNC: 10 MEQ/L
AST SERPL-CCNC: 22 UNIT/L (ref 5–34)
BASOPHILS # BLD AUTO: 0.02 X10(3)/MCL
BASOPHILS NFR BLD AUTO: 0.5 %
BILIRUB SERPL-MCNC: 0.6 MG/DL
BUN SERPL-MCNC: 17 MG/DL (ref 9.8–20.1)
CALCIUM SERPL-MCNC: 8.9 MG/DL (ref 8.4–10.2)
CHLORIDE SERPL-SCNC: 107 MMOL/L (ref 98–107)
CO2 SERPL-SCNC: 23 MMOL/L (ref 23–31)
CREAT SERPL-MCNC: 0.72 MG/DL (ref 0.55–1.02)
CREAT/UREA NIT SERPL: 24
EOSINOPHIL # BLD AUTO: 0.17 X10(3)/MCL (ref 0–0.9)
EOSINOPHIL NFR BLD AUTO: 4.4 %
ERYTHROCYTE [DISTWIDTH] IN BLOOD BY AUTOMATED COUNT: 13.8 % (ref 11.5–17)
GFR SERPLBLD CREATININE-BSD FMLA CKD-EPI: >60 ML/MIN/1.73/M2
GLOBULIN SER-MCNC: 3 GM/DL (ref 2.4–3.5)
GLUCOSE SERPL-MCNC: 109 MG/DL (ref 82–115)
HCT VFR BLD AUTO: 37.4 % (ref 37–47)
HGB BLD-MCNC: 12.1 G/DL (ref 12–16)
IMM GRANULOCYTES # BLD AUTO: 0.02 X10(3)/MCL (ref 0–0.04)
IMM GRANULOCYTES NFR BLD AUTO: 0.5 %
LYMPHOCYTES # BLD AUTO: 1.24 X10(3)/MCL (ref 0.6–4.6)
LYMPHOCYTES NFR BLD AUTO: 31.9 %
MCH RBC QN AUTO: 28.8 PG (ref 27–31)
MCHC RBC AUTO-ENTMCNC: 32.4 G/DL (ref 33–36)
MCV RBC AUTO: 89 FL (ref 80–94)
MONOCYTES # BLD AUTO: 0.27 X10(3)/MCL (ref 0.1–1.3)
MONOCYTES NFR BLD AUTO: 6.9 %
NEUTROPHILS # BLD AUTO: 2.17 X10(3)/MCL (ref 2.1–9.2)
NEUTROPHILS NFR BLD AUTO: 55.8 %
PLATELET # BLD AUTO: 119 X10(3)/MCL (ref 130–400)
PMV BLD AUTO: 11 FL (ref 7.4–10.4)
POTASSIUM SERPL-SCNC: 3.8 MMOL/L (ref 3.5–5.1)
PROT SERPL-MCNC: 6.6 GM/DL (ref 5.8–7.6)
RBC # BLD AUTO: 4.2 X10(6)/MCL (ref 4.2–5.4)
SODIUM SERPL-SCNC: 140 MMOL/L (ref 136–145)
WBC # BLD AUTO: 3.89 X10(3)/MCL (ref 4.5–11.5)

## 2024-12-26 PROCEDURE — 85025 COMPLETE CBC W/AUTO DIFF WBC: CPT

## 2024-12-26 PROCEDURE — 96413 CHEMO IV INFUSION 1 HR: CPT

## 2024-12-26 PROCEDURE — 25000003 PHARM REV CODE 250: Performed by: INTERNAL MEDICINE

## 2024-12-26 PROCEDURE — 99215 OFFICE O/P EST HI 40 MIN: CPT | Mod: PBBFAC,25 | Performed by: INTERNAL MEDICINE

## 2024-12-26 PROCEDURE — 80053 COMPREHEN METABOLIC PANEL: CPT

## 2024-12-26 PROCEDURE — 36415 COLL VENOUS BLD VENIPUNCTURE: CPT

## 2024-12-26 PROCEDURE — 99999 PR PBB SHADOW E&M-EST. PATIENT-LVL V: CPT | Mod: PBBFAC,,, | Performed by: INTERNAL MEDICINE

## 2024-12-26 PROCEDURE — 86300 IMMUNOASSAY TUMOR CA 15-3: CPT

## 2024-12-26 PROCEDURE — 63600175 PHARM REV CODE 636 W HCPCS: Mod: JG | Performed by: INTERNAL MEDICINE

## 2024-12-26 RX ORDER — SODIUM CHLORIDE 0.9 % (FLUSH) 0.9 %
10 SYRINGE (ML) INJECTION
Status: CANCELLED | OUTPATIENT
Start: 2024-12-26

## 2024-12-26 RX ORDER — EPINEPHRINE 0.3 MG/.3ML
0.3 INJECTION SUBCUTANEOUS ONCE AS NEEDED
Status: CANCELLED | OUTPATIENT
Start: 2024-12-26

## 2024-12-26 RX ORDER — HEPARIN 100 UNIT/ML
500 SYRINGE INTRAVENOUS
Status: DISCONTINUED | OUTPATIENT
Start: 2024-12-26 | End: 2024-12-26 | Stop reason: HOSPADM

## 2024-12-26 RX ORDER — EPINEPHRINE 0.3 MG/.3ML
0.3 INJECTION SUBCUTANEOUS ONCE AS NEEDED
Status: DISCONTINUED | OUTPATIENT
Start: 2024-12-26 | End: 2024-12-26 | Stop reason: HOSPADM

## 2024-12-26 RX ORDER — DIPHENHYDRAMINE HYDROCHLORIDE 50 MG/ML
50 INJECTION INTRAMUSCULAR; INTRAVENOUS ONCE AS NEEDED
Status: DISCONTINUED | OUTPATIENT
Start: 2024-12-26 | End: 2024-12-26 | Stop reason: HOSPADM

## 2024-12-26 RX ORDER — SODIUM CHLORIDE 0.9 % (FLUSH) 0.9 %
10 SYRINGE (ML) INJECTION
Status: DISCONTINUED | OUTPATIENT
Start: 2024-12-26 | End: 2024-12-26 | Stop reason: HOSPADM

## 2024-12-26 RX ORDER — DIPHENHYDRAMINE HYDROCHLORIDE 50 MG/ML
50 INJECTION INTRAMUSCULAR; INTRAVENOUS ONCE AS NEEDED
Status: CANCELLED | OUTPATIENT
Start: 2024-12-26

## 2024-12-26 RX ORDER — HEPARIN 100 UNIT/ML
500 SYRINGE INTRAVENOUS
Status: CANCELLED | OUTPATIENT
Start: 2024-12-26

## 2024-12-26 RX ADMIN — TRASTUZUMAB-ANNS 450 MG: 420 INJECTION, POWDER, LYOPHILIZED, FOR SOLUTION INTRAVENOUS at 02:12

## 2024-12-27 LAB — CANCER AG27-29 SERPL-ACNC: 26.6 U/ML

## 2025-01-16 ENCOUNTER — INFUSION (OUTPATIENT)
Dept: INFUSION THERAPY | Facility: HOSPITAL | Age: 85
End: 2025-01-16
Attending: INTERNAL MEDICINE
Payer: MEDICARE

## 2025-01-16 ENCOUNTER — OFFICE VISIT (OUTPATIENT)
Dept: HEMATOLOGY/ONCOLOGY | Facility: CLINIC | Age: 85
End: 2025-01-16
Payer: MEDICARE

## 2025-01-16 VITALS
BODY MASS INDEX: 28.57 KG/M2 | TEMPERATURE: 99 F | HEART RATE: 79 BPM | DIASTOLIC BLOOD PRESSURE: 55 MMHG | RESPIRATION RATE: 18 BRPM | WEIGHT: 167.38 LBS | OXYGEN SATURATION: 96 % | HEIGHT: 64 IN | SYSTOLIC BLOOD PRESSURE: 133 MMHG

## 2025-01-16 DIAGNOSIS — Z17.31 HER2-POSITIVE CARCINOMA OF BREAST: Primary | ICD-10-CM

## 2025-01-16 DIAGNOSIS — Z17.0 MALIGNANT NEOPLASM OF CENTRAL PORTION OF RIGHT BREAST IN FEMALE, ESTROGEN RECEPTOR POSITIVE: ICD-10-CM

## 2025-01-16 DIAGNOSIS — C18.6 MALIGNANT NEOPLASM OF DESCENDING COLON: ICD-10-CM

## 2025-01-16 DIAGNOSIS — C50.919 HER2-POSITIVE CARCINOMA OF BREAST: Primary | ICD-10-CM

## 2025-01-16 DIAGNOSIS — D69.6 THROMBOCYTOPENIA: ICD-10-CM

## 2025-01-16 DIAGNOSIS — C50.111 MALIGNANT NEOPLASM OF CENTRAL PORTION OF RIGHT BREAST IN FEMALE, ESTROGEN RECEPTOR POSITIVE: ICD-10-CM

## 2025-01-16 PROCEDURE — 99999 PR PBB SHADOW E&M-EST. PATIENT-LVL V: CPT | Mod: PBBFAC,,, | Performed by: NURSE PRACTITIONER

## 2025-01-16 PROCEDURE — 25000003 PHARM REV CODE 250: Performed by: NURSE PRACTITIONER

## 2025-01-16 PROCEDURE — 63600175 PHARM REV CODE 636 W HCPCS: Mod: TB | Performed by: NURSE PRACTITIONER

## 2025-01-16 PROCEDURE — 99215 OFFICE O/P EST HI 40 MIN: CPT | Mod: PBBFAC,25 | Performed by: NURSE PRACTITIONER

## 2025-01-16 PROCEDURE — 99215 OFFICE O/P EST HI 40 MIN: CPT | Mod: S$PBB,,, | Performed by: NURSE PRACTITIONER

## 2025-01-16 PROCEDURE — 96413 CHEMO IV INFUSION 1 HR: CPT

## 2025-01-16 RX ORDER — EPINEPHRINE 0.3 MG/.3ML
0.3 INJECTION SUBCUTANEOUS ONCE AS NEEDED
Status: CANCELLED | OUTPATIENT
Start: 2025-01-16

## 2025-01-16 RX ORDER — EPINEPHRINE 0.3 MG/.3ML
0.3 INJECTION SUBCUTANEOUS ONCE AS NEEDED
Status: DISCONTINUED | OUTPATIENT
Start: 2025-01-16 | End: 2025-01-16 | Stop reason: HOSPADM

## 2025-01-16 RX ORDER — SODIUM CHLORIDE 0.9 % (FLUSH) 0.9 %
10 SYRINGE (ML) INJECTION
Status: DISCONTINUED | OUTPATIENT
Start: 2025-01-16 | End: 2025-01-16 | Stop reason: HOSPADM

## 2025-01-16 RX ORDER — SODIUM CHLORIDE 0.9 % (FLUSH) 0.9 %
10 SYRINGE (ML) INJECTION
Status: CANCELLED | OUTPATIENT
Start: 2025-01-16

## 2025-01-16 RX ORDER — DIPHENHYDRAMINE HYDROCHLORIDE 50 MG/ML
50 INJECTION INTRAMUSCULAR; INTRAVENOUS ONCE AS NEEDED
Status: DISCONTINUED | OUTPATIENT
Start: 2025-01-16 | End: 2025-01-16 | Stop reason: HOSPADM

## 2025-01-16 RX ORDER — DIPHENHYDRAMINE HYDROCHLORIDE 50 MG/ML
50 INJECTION INTRAMUSCULAR; INTRAVENOUS ONCE AS NEEDED
Status: CANCELLED | OUTPATIENT
Start: 2025-01-16

## 2025-01-16 RX ORDER — HEPARIN 100 UNIT/ML
500 SYRINGE INTRAVENOUS
Status: CANCELLED | OUTPATIENT
Start: 2025-01-16

## 2025-01-16 RX ORDER — HEPARIN 100 UNIT/ML
500 SYRINGE INTRAVENOUS
Status: DISCONTINUED | OUTPATIENT
Start: 2025-01-16 | End: 2025-01-16 | Stop reason: HOSPADM

## 2025-01-16 RX ADMIN — SODIUM CHLORIDE: 9 INJECTION, SOLUTION INTRAVENOUS at 03:01

## 2025-01-16 RX ADMIN — TRASTUZUMAB-ANNS 450 MG: 420 INJECTION, POWDER, LYOPHILIZED, FOR SOLUTION INTRAVENOUS at 03:01

## 2025-01-16 NOTE — NURSING
C4 Kanjinti given, tolerated well. Discharged in stable condition and is aware of future appointments.

## 2025-01-16 NOTE — PROGRESS NOTES
HEMATOLOGY/ONCOLOGY OFFICE CLINIC VISIT    Visit Information:    Initial Evaluation: 5/26/2021  Referring Provider: Dr. Robert Conner  Other providers:  Code status:  Not addressed    Diagnosis:  1) T2N0M0-Stage I Colon cancer. 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   Letrozole started 11/2024    Treatment/Oncology history:  -03/15/2021: Colonoscopy: malignant partially obstructing tumor in the ascending colon. Biopsy tubular adenoma with at least high-grade dysplasia.   -04/22/2021: Laparoscopic/robotic right hemicolectomy   -08/15/2024: Abnormal right breast mammogram BI-RADS category 4, suspicious  -08/26/2024: Ultrasound-guided right breast biopsy at 1:00 a.m. position--> invasive lobular carcinoma  -10/09/2024: s/p right lumpectomy with sentinel lymph node biopsy       Imaging:  CT A/P 3/17/2021 at [facility]: irregular colonic mass 5 to 6 cm in length proximal to midportion of descending colon.  Enhancing soft tissue component contacts and may invade the right lateral abdominal wall.  No regional or distant lymphadenopathy identified.  Cirrhotic liver with portal hypertension, small volume ascites and splenomegaly.  No focal lesion identified in the liver.    CT of the thorax 4/5/2021: no thoracic metastatic disease identified.  Small volume ascites and borderline splenomegaly.No adenopathy or distant metastases.  CT angiogram 5/20/2021: negative for pulmonary embolism but lungs demonstrate mild heterogeneous attenuation with subtle peripheral reticular opacities.  Primary differential consideration is small airways disease.  Findings may be secondary to multifocal infiltrate from infection versus pulmonary edema.  It also showed moderate ascites with increase in size since prior examination.   Liver with nodular contour and was enlarged.  Spleen borderline enlarged.  A 1.3 x 1 cm enhancing nodule in the left adrenal gland no significant change.  Ultrasound of the abdomen 5/23/2021:cirrhosis of the liver with hepatosplenomegaly and ascites.   Ultrasound of the abdomen 10/26/2022: The pancreas appears grossly unremarkable.  No pancreatic mass or lesion is seen. liver is slightly enlarged in size. Liver is echogenic it measures 16 cm by my measurements..  No liver mass or lesion is seen. spleen appears enlarged and measures 14.5 cm.  Hepatomegaly with findings consistent with hepatic steatosis  CT C/A/P 2/16/2023:     1. No evidence of metastatic disease within the chest, abdomen and pelvis  2. Cirrhotic morphology of the liver  3. Changes of portal hypertension including borderline splenomegaly and portosystemic collateral  4. Mild interstitial scarring within the lungs.  CT C/A/P  8/28/2023:  No new suspicious findings chest, abdomen or pelvis.  Chronic findings above.  CT C/A/P 2/21/2024: No detrimental change identified since 08/28/2023.   CT 8/22/2024: Similar mild chronic changes in the lungs with no suspicious pulmonary nodule.  Moderate coronary artery calcification.  No pleural or pericardial effusion.  No pathologically enlarged thoracic lymph node. Cirrhosis with mild hepatic steatosis.  Similar mild splenomegaly.  Stable pancreas, adrenal glands and kidneys.  Very small hiatal hernia.  Normal caliber small bowel and colon with right mid abdominal ileal colonic anastomosis.  Distal colonic diverticulosis.  Normal bladder.  No ascites or abdominal/pelvic lymphadenopathy.  Aortoiliac atherosclerosis. No aggressive osseous lesion.No metastatic disease identified.     ECHO:  10/17/24, LVEF 60-65%`    Bone Density:  8/9/24 @ [facility]: Osteopenia      Pathology:  4/22/2022:   RIGHT COLON, HEMICOLECTOMY: ADENOCARCINOMA, WELL DIFFERENTIATED, 90% MUCINOUS.   --  Greatest tumor dimension, 7.0 cm (as measured  grossly).  --  Adenocarcinoma arises from tubular adenoma with high grade dysplasia.  --  Lymphovascular invasion, not identified.  --  Adenocarcinoma superficially invades muscularis propria.  --  Eighteen mesenteric lymph nodes, no neoplasm (0/18).  --  Surgical margins, uninvolved.  POSTOPERATIVE SPINDLE CELL PSEUDOTUMOR, 1.5 CM.  -  The pseudotumor extends into mesenteric adipose tissue.     9/24/2021:  LIVER, CORE NEEDLE BIOPSY: FOCAL PORTAL TRIADITIS WITH SINUS DILATION.   - NO INTERFACE ACTIVITY.   - BRIDING FIBROSIS (FIBROSIS STAGE  F2-3)  - NO STEATOSIS.  Comment: Despite hepatitis testing demonstrating reactivity for hepatitis A in July 2021, no evidence of acute hepatitis is identified and LFTs are currently within normal limits. This case has also been reviewed in intradepartmental consultation.    8/26/2024:  RIGHT BREAST, 1 O'CLOCK MASS ULTRASOUND GUIDED VACUUM BIOPSY:  - Invasive carcinoma with lobular features, [place] Grade 3, see comment.  - Carcinoma involving 3 of 4 cores, 7 mm in longest linear extent.  ER 89.40%, PA 82.10%, HER2 equivocal, 2+, amplified by fish, Ki 67, high proliferation, 57.30%    10/9/2024:  1. BREAST, RIGHT, LUMPECTOMY:   PLEOMORPHIC LOBULAR CARCINOMA   DUCTAL CARCINOMA IN SITU WITH LOBULAR EXTENSION.   2. BREAST, SUPERIOR MARGIN, REEXCISION: NEGATIVE FOR INVASIVE AND IN SITU CARCINOMA.   3. AXILLA, ADDITIONAL AXILLA CONTENTS, EXCISION: TWO LYMPH NODES, NEGATIVE FOR METASTATIC CARCINOMA.   4. LYMPH NODE, RIGHT SENTINEL #1, EXCISION: ONE LYMPH NODE, NEGATIVE FOR METASTATIC CARCINOMA.   5. LYMPH NODE, RIGHT SENTINEL #2, EXCISION: ONE LYMPH NODE, NEGATIVE FOR METASTATIC CARCINOMA.   6. LYMPH NODES, RIGHT SENTINEL #3, EXCISION: TWO LYMPH NODES, NEGATIVE FOR METASTATIC CARCINOMA.     TUMOR Histologic Type:  Invasive carcinoma with features of : Pleomorphic lobular   Histologic Grade (Xavier Histologic Score):  Score 3   Tumor Size: 21 mm   Tumor Focality:  Single focus of  invasive carcinoma   Ductal Carcinoma In Situ (DCIS):  Present   Lymphovascular Invasion:  Cannot be determined: Suspicious for LVI   MARGINS:  All margins negative for invasive carcinoma   REGIONAL LYMPH NODES   Total Number of Lymph Nodes Examined (sentinel and non-sentinel):  Exact number : 5   Number of [place] Nodes Examined:  Exact number : 3      PATHOLOGIC STAGE CLASSIFICATION (pTNM, AJCC 8th Edition)   pT2pN0           CLINICAL HISTORY:       Patient: 85 year old female with multiple medical problems kindly referred for colon cancer.  She initially presented with anemia and fecal occult blood positive.  She also reports 20 pounds weight loss over the last previous 6 months. Colonoscopy on 3/15/2021 revealed a malignant partially obstructing tumor in the ascending colon. Biopsy showed tubular adenoma with at least high-grade dysplasia.  On 3/17/2021 she underwent a CT scan of the abdomen and pelvis at [facility] that showed an irregular colonic mass measuring about 5 to 6 cm in length involving proximal to midportion of descending colon.  Enhancing soft tissue component contacts and may invade the right lateral abdominal wall.  No regional or distant lymphadenopathy identified.  Cirrhotic liver with portal hypertension, small volume ascites and splenomegaly.  No focal lesion identified in the liver.  Staging CT of the thorax 4/5/2021 with no thoracic metastatic disease identified.  Small volume ascites and borderline splenomegaly.No adenopathy or distant metastases.   Patient was seen by Dr. Robert Conner and she underwent laparoscopic/robotic right hemicolectomy on 4/22/2021.  Pathology report as follows:   RIGHT COLON, HEMICOLECTOMY: ADENOCARCINOMA, WELL DIFFERENTIATED, 90% MUCINOUS. 0/18 LN positive for malignanacy.See above for details.  Patient recovered from the surgery but came back to the emergency department for chest pain. She underwent CT angiogram that was negative for pulmonary embolism  but lungs demonstrate mild heterogeneous attenuation with subtle peripheral reticular opacities.  Primary differential consideration is small airways disease.  Findings may be secondary to multifocal infiltrate from infection versus pulmonary edema.  It also showed moderate ascites with increase in size since prior examination.  Liver with nodular contour and was enlarged.  Spleen borderline enlarged.  1.3 x 1 cm enhancing nodule in the left adrenal gland no significant change.  She underwent ultrasound of the abdomen that confirmed the diagnosis of cirrhosis of the liver with hepatosplenomegaly and ascites.  She was discharged on 5/20/2021.  She stopped drinking at age 38, she says that she is a social drinker. She denies any family history of colon cancer. She denies any fever, chills, or sweats. No chest pain or shortness of breath.  She had liver biopsy ordered by Dr. Louis on 9/24/21. It was ordered for cirrhosis. She has no history of hepatitis infection or alcohol abuse. Biopsy was negative for malignancy. Just  fibrosis.              Chief Complaint: 3 Week Follow Up      Interval History:  Patient presents today for follow up. She underwent right breast lumpectomy with biopsy on 10/9/24 with Dr. Conner.   Herceptin and letrozole and so far tolerating treatment well.       ROS: All 14 points ROS taken and as per Interval History  Review of Systems   Constitutional:  Negative for chills, fever and weight loss.   HENT:  Negative for congestion and nosebleeds.    Eyes:  Negative for blurred vision, double vision and photophobia.   Respiratory:  Negative for cough, hemoptysis and shortness of breath.    Cardiovascular:  Negative for chest pain, palpitations, leg swelling and PND.   Gastrointestinal:  Positive for diarrhea. Negative for abdominal pain, blood in stool, constipation, melena, nausea and vomiting.   Genitourinary:  Negative for dysuria, frequency, hematuria and urgency.   Musculoskeletal:  Negative  for back pain, falls and myalgias.   Skin:  Negative for itching and rash.   Neurological:  Negative for tremors, focal weakness, seizures, weakness and headaches.   Endo/Heme/Allergies:  Negative for environmental allergies. Does not bruise/bleed easily.   Psychiatric/Behavioral:  Negative for depression and suicidal ideas. The patient is nervous/anxious.          Histories:  PMH/PSH/FH/SOCIAL/ALLERGIES AND MEDS REVIEWED AND UPDATED AS APPROPRIATE       Vitals:    01/16/25 1353   Weight: 75.9 kg (167 lb 6.4 oz)               Wt Readings from Last 6 Encounters:   01/16/25 75.9 kg (167 lb 6.4 oz)   12/26/24 76.3 kg (168 lb 4.8 oz)   12/05/24 75.8 kg (167 lb 1.7 oz)   12/05/24 75.8 kg (167 lb 1.6 oz)   11/14/24 76.2 kg (168 lb 1.6 oz)   11/11/24 75.8 kg (167 lb 1.6 oz)     Body mass index is 28.73 kg/m².  Body surface area is 1.85 meters squared.       Physical Exam  Constitutional:       Appearance: Normal appearance.   HENT:      Head: Normocephalic and atraumatic.   Eyes:      General: No scleral icterus.     Extraocular Movements: Extraocular movements intact.      Conjunctiva/sclera: Conjunctivae normal.   Neck:      Vascular: No JVD.   Cardiovascular:      Rate and Rhythm: Normal rate and regular rhythm.      Heart sounds: No murmur heard.  Pulmonary:      Effort: Pulmonary effort is normal.      Breath sounds: Normal breath sounds. No wheezing or rhonchi.   Chest:   Breasts:     Right: Normal. No swelling, mass, nipple discharge, skin change or tenderness.      Left: No swelling, mass, nipple discharge, skin change or tenderness.      Comments: Surgical sites well healed  Abdominal:      General: Bowel sounds are normal. There is no distension.      Palpations: Abdomen is soft. There is no mass.      Tenderness: There is no abdominal tenderness.   Musculoskeletal:      Cervical back: Neck supple.   Lymphadenopathy:      Head:      Right side of head: No submental or submandibular adenopathy.      Left side of  head: No submental or submandibular adenopathy.      Cervical: No cervical adenopathy.      Upper Body:      Right upper body: No supraclavicular or axillary adenopathy.      Left upper body: No supraclavicular or axillary adenopathy.      Lower Body: No right inguinal adenopathy. No left inguinal adenopathy.   Skin:     General: Skin is warm.      Coloration: Skin is not jaundiced.      Findings: No lesion or rash.      Nails: There is no clubbing.   Neurological:      Mental Status: She is alert and oriented to person, place, and time.      Cranial Nerves: Cranial nerves 2-12 are intact.   Psychiatric:         Attention and Perception: Attention normal.         Behavior: Behavior is cooperative.         Judgment: Judgment normal.       ECOG SCORE             Laboratory:  CBC with Differential:  Lab Results   Component Value Date    WBC 4.91 01/16/2025    RBC 4.41 01/16/2025    HGB 12.7 01/16/2025    HCT 38.8 01/16/2025    MCV 88.0 01/16/2025    MCH 28.8 01/16/2025    MCHC 32.7 (L) 01/16/2025    RDW 13.7 01/16/2025     (L) 01/16/2025    MPV 10.3 01/16/2025        CMP:  Sodium   Date Value Ref Range Status   01/16/2025 142 136 - 145 mmol/L Final     Potassium   Date Value Ref Range Status   01/16/2025 4.3 3.5 - 5.1 mmol/L Final     Chloride   Date Value Ref Range Status   01/16/2025 108 (H) 98 - 107 mmol/L Final     CO2   Date Value Ref Range Status   01/16/2025 28 23 - 31 mmol/L Final     Blood Urea Nitrogen   Date Value Ref Range Status   01/16/2025 13.9 9.8 - 20.1 mg/dL Final     Creatinine   Date Value Ref Range Status   01/16/2025 0.82 0.55 - 1.02 mg/dL Final     Calcium   Date Value Ref Range Status   01/16/2025 9.6 8.4 - 10.2 mg/dL Final     Albumin   Date Value Ref Range Status   01/16/2025 3.9 3.4 - 4.8 g/dL Final     Bilirubin Total   Date Value Ref Range Status   01/16/2025 0.6 <=1.5 mg/dL Final     ALP   Date Value Ref Range Status   01/16/2025 49 40 - 150 unit/L Final     AST   Date Value Ref  Range Status   01/16/2025 23 5 - 34 unit/L Final     ALT   Date Value Ref Range Status   01/16/2025 18 0 - 55 unit/L Final     Estimated GFR-Non    Date Value Ref Range Status   05/20/2022 >60 mls/min/1.73/m2 Final         Assessment:       1. Malignant neoplasm of descending colon    2. Thrombocytopenia           1.) Invasive lobular carcinoma right breast Dx 8/26//2024  ---ER 89.40%, PA 82.10%, HER2 equivocal, 2+, amplified by fish, Ki 67, high proliferation, 57.30%   ---status post right lumpectomy with sentinel lymph node biopsy on 10/9/2024--Invasive Pleomorphic lobular carcinoma   ---T2 N0 M0 stage IB, G3, ER/PA/HER2 positive    Review with her and daughter again today; diagnosis, prognosis and treatment recommendations according to NCCN guidelines.  Even though she is an elderly patient she will benefit of Herceptin base regimen followed by hormonal therapy once chemotherapy completed.  Because of her age recommend taxane with Herceptin.  Patient is adamant that she will not have chemotherapy or radiation therapy (not indicated at her age, also LN neg).  She does not opposed to try Herceptin and aromatase inhibitor.    Patient with osteopenia, therefore we will start Prolia with letrozole for bone health and to decreased risk of pathological fractures and development of osteoporosis    2.) pT2pN0M0--Stage I colon cancer--diagnosed in April 2021   -3/15/2021:Colonoscopy: malignant partially obstructing tumor in the ascending colon. Biopsy tubular adenoma with at least high-grade dysplasia.    -4/22/2021: Laparoscopic/robotic right hemicolectomy    -Path: Adenocarcinoma superficially invades muscularis propria. Eighteen mesenteric lymph nodes, no neoplasm (0/18).     NCCN guidelines (COL-3) for pathological stage T2, N0, M0   Surveillance (COL-8): colonoscopy in 1 year after surgery and then in 1 year for advanced adenoma, if no advanced adenoma then repeat in 3 yrs and then q 5 yrs.   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  These are benign lesions.    4) Liver disease--Cirrhosis of the liver with Splenomegaly and ascites. Followed by GI    5) Heart disease- s/p stent placement  --ECHO with normal EF 60-65%        Plan:       No clinical evidence of colon cancer recurrence.  CT C/A/P with [facility], stable benign findings. Patient with an abnormal mammogram for which she underwent right breast biopsy and pathology positive for Invasive lobular carcinoma. S/p lumpectomy - Stage ER/CA Her2 pos.   Discussed with the patient and her daughter diagnosis, prognosis and treatment recommendations according to staging and receptors.  She is 84 and with comorbidities.    Even though she is an elderly patient she will benefit of Herceptin base regimen followed by hormonal therapy once chemotherapy completed.  Because of her age recommend taxane (docetaxel or paclitaxel) with Herceptin. Will avoid Carboplatin or adriamycin.  Patient is adamant that she will not have chemotherapy or radiation therapy.  She does not opposed to try Herceptin and aromatase inhibitor.  So far tolerating treatment well we will continue present management    Herceptin every 3 weeks  Prolia every 6 months  Continue follow ups with Dr. Conner   ECHO every 3 months - last was on 12/18/24 with Dr. Saab @ [facility]  Labs: CBC, CMP, CA 27-29  Continue follow ups with other physicians  Ok to proceed with Herceptin C4 today  RTC 3 weeks with labs same day    Encouraged to call for any questions or problems  The patient was given ample opportunity to ask questions and they were all answered to satisfaction; patient demonstrated understanding of what we discussed and is agreeable to the plan.       ARUN Arreguin

## 2025-02-03 DIAGNOSIS — C50.111 MALIGNANT NEOPLASM OF CENTRAL PORTION OF RIGHT BREAST IN FEMALE, ESTROGEN RECEPTOR POSITIVE: ICD-10-CM

## 2025-02-03 DIAGNOSIS — Z17.31 HER2-POSITIVE CARCINOMA OF BREAST: ICD-10-CM

## 2025-02-03 DIAGNOSIS — C50.919 HER2-POSITIVE CARCINOMA OF BREAST: ICD-10-CM

## 2025-02-03 DIAGNOSIS — C18.2 MALIGNANT NEOPLASM OF ASCENDING COLON: ICD-10-CM

## 2025-02-03 DIAGNOSIS — Z17.0 MALIGNANT NEOPLASM OF CENTRAL PORTION OF RIGHT BREAST IN FEMALE, ESTROGEN RECEPTOR POSITIVE: ICD-10-CM

## 2025-02-03 DIAGNOSIS — Z98.890 S/P LUMPECTOMY OF BREAST: ICD-10-CM

## 2025-02-03 RX ORDER — LETROZOLE 2.5 MG/1
TABLET, FILM COATED ORAL
Qty: 30 TABLET | Refills: 0 | Status: SHIPPED | OUTPATIENT
Start: 2025-02-03

## 2025-02-06 ENCOUNTER — INFUSION (OUTPATIENT)
Dept: INFUSION THERAPY | Facility: HOSPITAL | Age: 85
End: 2025-02-06
Attending: INTERNAL MEDICINE
Payer: MEDICARE

## 2025-02-06 ENCOUNTER — LAB VISIT (OUTPATIENT)
Dept: LAB | Facility: HOSPITAL | Age: 85
End: 2025-02-06
Attending: INTERNAL MEDICINE
Payer: MEDICARE

## 2025-02-06 ENCOUNTER — OFFICE VISIT (OUTPATIENT)
Dept: HEMATOLOGY/ONCOLOGY | Facility: CLINIC | Age: 85
End: 2025-02-06
Payer: MEDICARE

## 2025-02-06 VITALS — WEIGHT: 167.5 LBS | BODY MASS INDEX: 28.6 KG/M2 | HEIGHT: 64 IN

## 2025-02-06 VITALS
OXYGEN SATURATION: 97 % | RESPIRATION RATE: 18 BRPM | DIASTOLIC BLOOD PRESSURE: 56 MMHG | WEIGHT: 167.5 LBS | BODY MASS INDEX: 28.6 KG/M2 | HEIGHT: 64 IN | TEMPERATURE: 98 F | SYSTOLIC BLOOD PRESSURE: 129 MMHG | HEART RATE: 71 BPM

## 2025-02-06 DIAGNOSIS — C18.9 MALIGNANT NEOPLASM OF COLON, UNSPECIFIED PART OF COLON: ICD-10-CM

## 2025-02-06 DIAGNOSIS — C50.111 MALIGNANT NEOPLASM OF CENTRAL PORTION OF RIGHT BREAST IN FEMALE, ESTROGEN RECEPTOR POSITIVE: ICD-10-CM

## 2025-02-06 DIAGNOSIS — C50.919 HER2-POSITIVE CARCINOMA OF BREAST: ICD-10-CM

## 2025-02-06 DIAGNOSIS — M85.80 OSTEOPENIA, UNSPECIFIED LOCATION: ICD-10-CM

## 2025-02-06 DIAGNOSIS — D69.6 THROMBOCYTOPENIA: ICD-10-CM

## 2025-02-06 DIAGNOSIS — Z17.31 HER2-POSITIVE CARCINOMA OF BREAST: ICD-10-CM

## 2025-02-06 DIAGNOSIS — C18.2 MALIGNANT NEOPLASM OF ASCENDING COLON: ICD-10-CM

## 2025-02-06 DIAGNOSIS — Z17.0 MALIGNANT NEOPLASM OF CENTRAL PORTION OF RIGHT BREAST IN FEMALE, ESTROGEN RECEPTOR POSITIVE: ICD-10-CM

## 2025-02-06 DIAGNOSIS — E55.9 VITAMIN D DEFICIENCY: ICD-10-CM

## 2025-02-06 DIAGNOSIS — Z51.81 ENCOUNTER FOR MONITORING CARDIOTOXIC DRUG THERAPY: Primary | ICD-10-CM

## 2025-02-06 DIAGNOSIS — C50.919 HER2-POSITIVE CARCINOMA OF BREAST: Primary | ICD-10-CM

## 2025-02-06 DIAGNOSIS — C18.6 MALIGNANT NEOPLASM OF DESCENDING COLON: ICD-10-CM

## 2025-02-06 DIAGNOSIS — Z17.31 HER2-POSITIVE CARCINOMA OF BREAST: Primary | ICD-10-CM

## 2025-02-06 DIAGNOSIS — Z79.899 ENCOUNTER FOR MONITORING CARDIOTOXIC DRUG THERAPY: Primary | ICD-10-CM

## 2025-02-06 DIAGNOSIS — E03.9 HYPOTHYROIDISM, UNSPECIFIED TYPE: ICD-10-CM

## 2025-02-06 DIAGNOSIS — D72.819 LEUKOPENIA, UNSPECIFIED TYPE: ICD-10-CM

## 2025-02-06 LAB
ALBUMIN SERPL-MCNC: 3.8 G/DL (ref 3.4–4.8)
ALBUMIN/GLOB SERPL: 1.2 RATIO (ref 1.1–2)
ALP SERPL-CCNC: 45 UNIT/L (ref 40–150)
ALT SERPL-CCNC: 23 UNIT/L (ref 0–55)
ANION GAP SERPL CALC-SCNC: 8 MEQ/L
AST SERPL-CCNC: 28 UNIT/L (ref 5–34)
BASOPHILS # BLD AUTO: 0.02 X10(3)/MCL
BASOPHILS NFR BLD AUTO: 0.4 %
BILIRUB SERPL-MCNC: 0.5 MG/DL
BUN SERPL-MCNC: 15 MG/DL (ref 9.8–20.1)
CALCIUM SERPL-MCNC: 10 MG/DL (ref 8.4–10.2)
CHLORIDE SERPL-SCNC: 109 MMOL/L (ref 98–107)
CO2 SERPL-SCNC: 26 MMOL/L (ref 23–31)
CREAT SERPL-MCNC: 0.79 MG/DL (ref 0.55–1.02)
CREAT/UREA NIT SERPL: 19
EOSINOPHIL # BLD AUTO: 0.15 X10(3)/MCL (ref 0–0.9)
EOSINOPHIL NFR BLD AUTO: 3.4 %
ERYTHROCYTE [DISTWIDTH] IN BLOOD BY AUTOMATED COUNT: 14 % (ref 11.5–17)
GFR SERPLBLD CREATININE-BSD FMLA CKD-EPI: >60 ML/MIN/1.73/M2
GLOBULIN SER-MCNC: 3.3 GM/DL (ref 2.4–3.5)
GLUCOSE SERPL-MCNC: 105 MG/DL (ref 82–115)
HCT VFR BLD AUTO: 37.5 % (ref 37–47)
HGB BLD-MCNC: 12.1 G/DL (ref 12–16)
IMM GRANULOCYTES # BLD AUTO: 0.01 X10(3)/MCL (ref 0–0.04)
IMM GRANULOCYTES NFR BLD AUTO: 0.2 %
LYMPHOCYTES # BLD AUTO: 1.45 X10(3)/MCL (ref 0.6–4.6)
LYMPHOCYTES NFR BLD AUTO: 32.5 %
MCH RBC QN AUTO: 28.8 PG (ref 27–31)
MCHC RBC AUTO-ENTMCNC: 32.3 G/DL (ref 33–36)
MCV RBC AUTO: 89.3 FL (ref 80–94)
MONOCYTES # BLD AUTO: 0.36 X10(3)/MCL (ref 0.1–1.3)
MONOCYTES NFR BLD AUTO: 8.1 %
NEUTROPHILS # BLD AUTO: 2.47 X10(3)/MCL (ref 2.1–9.2)
NEUTROPHILS NFR BLD AUTO: 55.4 %
PLATELET # BLD AUTO: 93 X10(3)/MCL (ref 130–400)
PMV BLD AUTO: 11.8 FL (ref 7.4–10.4)
POTASSIUM SERPL-SCNC: 4.5 MMOL/L (ref 3.5–5.1)
PROT SERPL-MCNC: 7.1 GM/DL (ref 5.8–7.6)
RBC # BLD AUTO: 4.2 X10(6)/MCL (ref 4.2–5.4)
SODIUM SERPL-SCNC: 143 MMOL/L (ref 136–145)
WBC # BLD AUTO: 4.46 X10(3)/MCL (ref 4.5–11.5)

## 2025-02-06 PROCEDURE — 99215 OFFICE O/P EST HI 40 MIN: CPT | Mod: S$PBB,,, | Performed by: NURSE PRACTITIONER

## 2025-02-06 PROCEDURE — 25000003 PHARM REV CODE 250: Performed by: NURSE PRACTITIONER

## 2025-02-06 PROCEDURE — 85025 COMPLETE CBC W/AUTO DIFF WBC: CPT

## 2025-02-06 PROCEDURE — 63600175 PHARM REV CODE 636 W HCPCS: Mod: TB | Performed by: NURSE PRACTITIONER

## 2025-02-06 PROCEDURE — 80053 COMPREHEN METABOLIC PANEL: CPT

## 2025-02-06 PROCEDURE — 96413 CHEMO IV INFUSION 1 HR: CPT

## 2025-02-06 PROCEDURE — 36415 COLL VENOUS BLD VENIPUNCTURE: CPT

## 2025-02-06 PROCEDURE — 86300 IMMUNOASSAY TUMOR CA 15-3: CPT

## 2025-02-06 PROCEDURE — 99215 OFFICE O/P EST HI 40 MIN: CPT | Mod: PBBFAC,25 | Performed by: NURSE PRACTITIONER

## 2025-02-06 PROCEDURE — 99999 PR PBB SHADOW E&M-EST. PATIENT-LVL V: CPT | Mod: PBBFAC,,, | Performed by: NURSE PRACTITIONER

## 2025-02-06 RX ORDER — HEPARIN 100 UNIT/ML
500 SYRINGE INTRAVENOUS
Status: CANCELLED | OUTPATIENT
Start: 2025-02-06

## 2025-02-06 RX ORDER — DIPHENHYDRAMINE HYDROCHLORIDE 50 MG/ML
50 INJECTION INTRAMUSCULAR; INTRAVENOUS ONCE AS NEEDED
Status: CANCELLED | OUTPATIENT
Start: 2025-02-06

## 2025-02-06 RX ORDER — DIPHENHYDRAMINE HYDROCHLORIDE 50 MG/ML
50 INJECTION INTRAMUSCULAR; INTRAVENOUS ONCE AS NEEDED
Status: DISCONTINUED | OUTPATIENT
Start: 2025-02-06 | End: 2025-02-06 | Stop reason: HOSPADM

## 2025-02-06 RX ORDER — EPINEPHRINE 0.3 MG/.3ML
0.3 INJECTION SUBCUTANEOUS ONCE AS NEEDED
Status: DISCONTINUED | OUTPATIENT
Start: 2025-02-06 | End: 2025-02-06 | Stop reason: HOSPADM

## 2025-02-06 RX ORDER — SODIUM CHLORIDE 0.9 % (FLUSH) 0.9 %
10 SYRINGE (ML) INJECTION
Status: DISCONTINUED | OUTPATIENT
Start: 2025-02-06 | End: 2025-02-06 | Stop reason: HOSPADM

## 2025-02-06 RX ORDER — HEPARIN 100 UNIT/ML
500 SYRINGE INTRAVENOUS
Status: DISCONTINUED | OUTPATIENT
Start: 2025-02-06 | End: 2025-02-06 | Stop reason: HOSPADM

## 2025-02-06 RX ORDER — EPINEPHRINE 0.3 MG/.3ML
0.3 INJECTION SUBCUTANEOUS ONCE AS NEEDED
Status: CANCELLED | OUTPATIENT
Start: 2025-02-06

## 2025-02-06 RX ORDER — SODIUM CHLORIDE 0.9 % (FLUSH) 0.9 %
10 SYRINGE (ML) INJECTION
Status: CANCELLED | OUTPATIENT
Start: 2025-02-06

## 2025-02-06 RX ADMIN — TRASTUZUMAB-ANNS 450 MG: 420 INJECTION, POWDER, LYOPHILIZED, FOR SOLUTION INTRAVENOUS at 02:02

## 2025-02-06 NOTE — PROGRESS NOTES
HEMATOLOGY/ONCOLOGY OFFICE CLINIC VISIT    Visit Information:    Initial Evaluation: 5/26/2021  Referring Provider: Dr. Robert Conner  Other providers:  Code status:  Not addressed    Diagnosis:  1) T2N0M0-Stage I Colon cancer. 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   Letrozole started 11/2024    Treatment/Oncology history:  -03/15/2021: Colonoscopy: malignant partially obstructing tumor in the ascending colon. Biopsy tubular adenoma with at least high-grade dysplasia.   -04/22/2021: Laparoscopic/robotic right hemicolectomy   -08/15/2024: Abnormal right breast mammogram BI-RADS category 4, suspicious  -08/26/2024: Ultrasound-guided right breast biopsy at 1:00 a.m. position--> invasive lobular carcinoma  -10/09/2024: s/p right lumpectomy with sentinel lymph node biopsy       Imaging:  CT A/P 3/17/2021 at [facility]: irregular colonic mass 5 to 6 cm in length proximal to midportion of descending colon.  Enhancing soft tissue component contacts and may invade the right lateral abdominal wall.  No regional or distant lymphadenopathy identified.  Cirrhotic liver with portal hypertension, small volume ascites and splenomegaly.  No focal lesion identified in the liver.    CT of the thorax 4/5/2021: no thoracic metastatic disease identified.  Small volume ascites and borderline splenomegaly.No adenopathy or distant metastases.  CT angiogram 5/20/2021: negative for pulmonary embolism but lungs demonstrate mild heterogeneous attenuation with subtle peripheral reticular opacities.  Primary differential consideration is small airways disease.  Findings may be secondary to multifocal infiltrate from infection versus pulmonary edema.  It also showed moderate ascites with increase in size since prior examination.  Liver  with nodular contour and was enlarged.  Spleen borderline enlarged.  A 1.3 x 1 cm enhancing nodule in the left adrenal gland no significant change.  Ultrasound of the abdomen 5/23/2021:cirrhosis of the liver with hepatosplenomegaly and ascites.   Ultrasound of the abdomen 10/26/2022: The pancreas appears grossly unremarkable.  No pancreatic mass or lesion is seen. liver is slightly enlarged in size. Liver is echogenic it measures 16 cm by my measurements..  No liver mass or lesion is seen. spleen appears enlarged and measures 14.5 cm.  Hepatomegaly with findings consistent with hepatic steatosis  CT C/A/P 2/16/2023:     1. No evidence of metastatic disease within the chest, abdomen and pelvis  2. Cirrhotic morphology of the liver  3. Changes of portal hypertension including borderline splenomegaly and portosystemic collateral  4. Mild interstitial scarring within the lungs.  CT C/A/P  8/28/2023:  No new suspicious findings chest, abdomen or pelvis.  Chronic findings above.  CT C/A/P 2/21/2024: No detrimental change identified since 08/28/2023.   CT 8/22/2024: Similar mild chronic changes in the lungs with no suspicious pulmonary nodule.  Moderate coronary artery calcification.  No pleural or pericardial effusion.  No pathologically enlarged thoracic lymph node. Cirrhosis with mild hepatic steatosis.  Similar mild splenomegaly.  Stable pancreas, adrenal glands and kidneys.  Very small hiatal hernia.  Normal caliber small bowel and colon with right mid abdominal ileal colonic anastomosis.  Distal colonic diverticulosis.  Normal bladder.  No ascites or abdominal/pelvic lymphadenopathy.  Aortoiliac atherosclerosis. No aggressive osseous lesion.No metastatic disease identified.     ECHO:  10/17/24, LVEF 60-65%  02/18/24, [id number]%    Bone Density:  8/9/24 @ [facility]: Osteopenia      Pathology:  4/22/2022:   RIGHT COLON, HEMICOLECTOMY: ADENOCARCINOMA, WELL DIFFERENTIATED, 90% MUCINOUS.   --  Greatest tumor dimension, 7.0 cm  (as measured grossly).  --  Adenocarcinoma arises from tubular adenoma with high grade dysplasia.  --  Lymphovascular invasion, not identified.  --  Adenocarcinoma superficially invades muscularis propria.  --  Eighteen mesenteric lymph nodes, no neoplasm (0/18).  --  Surgical margins, uninvolved.  POSTOPERATIVE SPINDLE CELL PSEUDOTUMOR, 1.5 CM.  -  The pseudotumor extends into mesenteric adipose tissue.     9/24/2021:  LIVER, CORE NEEDLE BIOPSY: FOCAL PORTAL TRIADITIS WITH SINUS DILATION.   - NO INTERFACE ACTIVITY.   - BRIDING FIBROSIS (FIBROSIS STAGE  F2-3)  - NO STEATOSIS.  Comment: Despite hepatitis testing demonstrating reactivity for hepatitis A in July 2021, no evidence of acute hepatitis is identified and LFTs are currently within normal limits. This case has also been reviewed in intradepartmental consultation.    8/26/2024:  RIGHT BREAST, 1 O'CLOCK MASS ULTRASOUND GUIDED VACUUM BIOPSY:  - Invasive carcinoma with lobular features, Xavier Grade 3, see comment.  - Carcinoma involving 3 of 4 cores, 7 mm in longest linear extent.  ER 89.40%, [place] 82.10%, HER2 equivocal, 2+, amplified by fish, Ki 67, high proliferation, 57.30%    10/9/2024:  1. BREAST, RIGHT, LUMPECTOMY:   PLEOMORPHIC LOBULAR CARCINOMA   DUCTAL CARCINOMA IN SITU WITH LOBULAR EXTENSION.   2. BREAST, SUPERIOR MARGIN, REEXCISION: NEGATIVE FOR INVASIVE AND IN SITU CARCINOMA.   3. AXILLA, ADDITIONAL AXILLA CONTENTS, EXCISION: TWO LYMPH NODES, NEGATIVE FOR METASTATIC CARCINOMA.   4. LYMPH NODE, RIGHT SENTINEL #1, EXCISION: ONE LYMPH NODE, NEGATIVE FOR METASTATIC CARCINOMA.   5. LYMPH NODE, RIGHT SENTINEL #2, EXCISION: ONE LYMPH NODE, NEGATIVE FOR METASTATIC CARCINOMA.   6. LYMPH NODES, RIGHT SENTINEL #3, EXCISION: TWO LYMPH NODES, NEGATIVE FOR METASTATIC CARCINOMA.     TUMOR Histologic Type:  Invasive carcinoma with features of : Pleomorphic lobular   Histologic Grade (Frankford Histologic Score):  Score 3   Tumor Size: 21 mm   Tumor Focality:   Single focus of invasive carcinoma   Ductal Carcinoma In Situ (DCIS):  Present   Lymphovascular Invasion:  Cannot be determined: Suspicious for LVI   MARGINS:  All margins negative for invasive carcinoma   REGIONAL LYMPH NODES   Total Number of Lymph Nodes Examined (sentinel and non-sentinel):  Exact number : 5   Number of [place] Nodes Examined:  Exact number : 3      PATHOLOGIC STAGE CLASSIFICATION (pTNM, AJCC 8th Edition)   pT2pN0           CLINICAL HISTORY:       Patient: 85 year old female with multiple medical problems kindly referred for colon cancer.  She initially presented with anemia and fecal occult blood positive.  She also reports 20 pounds weight loss over the last previous 6 months. Colonoscopy on 3/15/2021 revealed a malignant partially obstructing tumor in the ascending colon. Biopsy showed tubular adenoma with at least high-grade dysplasia.  On 3/17/2021 she underwent a CT scan of the abdomen and pelvis at [facility] that showed an irregular colonic mass measuring about 5 to 6 cm in length involving proximal to midportion of descending colon.  Enhancing soft tissue component contacts and may invade the right lateral abdominal wall.  No regional or distant lymphadenopathy identified.  Cirrhotic liver with portal hypertension, small volume ascites and splenomegaly.  No focal lesion identified in the liver.  Staging CT of the thorax 4/5/2021 with no thoracic metastatic disease identified.  Small volume ascites and borderline splenomegaly.No adenopathy or distant metastases.   Patient was seen by Dr. Robert Conner and she underwent laparoscopic/robotic right hemicolectomy on 4/22/2021.  Pathology report as follows:   RIGHT COLON, HEMICOLECTOMY: ADENOCARCINOMA, WELL DIFFERENTIATED, 90% MUCINOUS. 0/18 LN positive for malignanacy.See above for details.  Patient recovered from the surgery but came back to the emergency department for chest pain. She underwent CT angiogram that was negative for  pulmonary embolism but lungs demonstrate mild heterogeneous attenuation with subtle peripheral reticular opacities.  Primary differential consideration is small airways disease.  Findings may be secondary to multifocal infiltrate from infection versus pulmonary edema.  It also showed moderate ascites with increase in size since prior examination.  Liver with nodular contour and was enlarged.  Spleen borderline enlarged.  1.3 x 1 cm enhancing nodule in the left adrenal gland no significant change.  She underwent ultrasound of the abdomen that confirmed the diagnosis of cirrhosis of the liver with hepatosplenomegaly and ascites.  She was discharged on 5/20/2021.  She stopped drinking at age 38, she says that she is a social drinker. She denies any family history of colon cancer. She denies any fever, chills, or sweats. No chest pain or shortness of breath.  She had liver biopsy ordered by Dr. Louis on 9/24/21. It was ordered for cirrhosis. She has no history of hepatitis infection or alcohol abuse. Biopsy was negative for malignancy. Just  fibrosis.              Chief Complaint: No chief complaint on file.      Interval History:  Patient presents today for follow up. She underwent right breast lumpectomy with biopsy on 10/9/24 with Dr. Conner.   Herceptin and letrozole and so far tolerating treatment well.     Interval history  2/06/2025:  January 16, 2025 status post trastuzumab.  February 3, 2025 letrozole 2.5 mg 1 p.o. daily was refilled to the patient's pharmacy.  On today's visit the patient presents to the office for follow-up and monitoring of her breast cancer on active treatment with Herceptin and letrozole.  The patient denies headache, diarrhea, nausea, and chills.        ROS: All 14 points ROS taken and as per Interval History  Review of Systems   Constitutional:  Negative for chills, fever and weight loss.   HENT:  Negative for congestion and nosebleeds.    Eyes:  Negative for blurred vision, double  vision and photophobia.   Respiratory:  Negative for cough, hemoptysis and shortness of breath.    Cardiovascular:  Negative for chest pain, palpitations, leg swelling and PND.   Gastrointestinal:  Positive for diarrhea. Negative for abdominal pain, blood in stool, constipation, melena, nausea and vomiting.   Genitourinary:  Negative for dysuria, frequency, hematuria and urgency.   Musculoskeletal:  Negative for back pain, falls and myalgias.   Skin:  Negative for itching and rash.   Neurological:  Negative for tremors, focal weakness, seizures, weakness and headaches.   Endo/Heme/Allergies:  Negative for environmental allergies. Does not bruise/bleed easily.   Psychiatric/Behavioral:  Negative for depression and suicidal ideas. The patient is nervous/anxious.      Histories:  PMH/PSH/FH/SOCIAL/ALLERGIES AND MEDS REVIEWED AND UPDATED AS APPROPRIATE       There were no vitals filed for this visit.    Wt Readings from Last 6 Encounters:   01/16/25 75.9 kg (167 lb 6.4 oz)   12/26/24 76.3 kg (168 lb 4.8 oz)   12/05/24 75.8 kg (167 lb 1.7 oz)   12/05/24 75.8 kg (167 lb 1.6 oz)   11/14/24 76.2 kg (168 lb 1.6 oz)   11/11/24 75.8 kg (167 lb 1.6 oz)     There is no height or weight on file to calculate BMI.  There is no height or weight on file to calculate BSA.       Physical Exam  Constitutional:       Appearance: Normal appearance.   HENT:      Head: Normocephalic and atraumatic.   Eyes:      General: No scleral icterus.     Extraocular Movements: Extraocular movements intact.      Conjunctiva/sclera: Conjunctivae normal.   Neck:      Vascular: No JVD.   Cardiovascular:      Rate and Rhythm: Normal rate and regular rhythm.      Heart sounds: No murmur heard.  Pulmonary:      Effort: Pulmonary effort is normal.      Breath sounds: Normal breath sounds. No wheezing or rhonchi.   Chest:   Breasts:     Right: Normal. No swelling, mass, nipple discharge, skin change or tenderness.      Left: No swelling, mass, nipple  discharge, skin change or tenderness.      Comments: Surgical sites well healed  Abdominal:      General: Bowel sounds are normal. There is no distension.      Palpations: Abdomen is soft. There is no mass.      Tenderness: There is no abdominal tenderness.   Musculoskeletal:      Cervical back: Neck supple.   Lymphadenopathy:      Head:      Right side of head: No submental or submandibular adenopathy.      Left side of head: No submental or submandibular adenopathy.      Cervical: No cervical adenopathy.      Upper Body:      Right upper body: No supraclavicular or axillary adenopathy.      Left upper body: No supraclavicular or axillary adenopathy.      Lower Body: No right inguinal adenopathy. No left inguinal adenopathy.   Skin:     General: Skin is warm.      Coloration: Skin is not jaundiced.      Findings: No lesion or rash.      Nails: There is no clubbing.   Neurological:      Mental Status: She is alert and oriented to person, place, and time.      Cranial Nerves: Cranial nerves 2-12 are intact.   Psychiatric:         Attention and Perception: Attention normal.         Behavior: Behavior is cooperative.         Judgment: Judgment normal.       ECOG SCORE             Laboratory:  CBC with Differential:  Lab Results   Component Value Date    WBC 4.46 (L) 02/06/2025    RBC 4.20 02/06/2025    HGB 12.1 02/06/2025    HCT 37.5 02/06/2025    MCV 89.3 02/06/2025    MCH 28.8 02/06/2025    MCHC 32.3 (L) 02/06/2025    RDW 14.0 02/06/2025    PLT 93 (L) 02/06/2025    MPV 11.8 (H) 02/06/2025        CMP:  Sodium   Date Value Ref Range Status   01/16/2025 142 136 - 145 mmol/L Final     Potassium   Date Value Ref Range Status   01/16/2025 4.3 3.5 - 5.1 mmol/L Final     Chloride   Date Value Ref Range Status   01/16/2025 108 (H) 98 - 107 mmol/L Final     CO2   Date Value Ref Range Status   01/16/2025 28 23 - 31 mmol/L Final     Blood Urea Nitrogen   Date Value Ref Range Status   01/16/2025 13.9 9.8 - 20.1 mg/dL Final      Creatinine   Date Value Ref Range Status   01/16/2025 0.82 0.55 - 1.02 mg/dL Final     Calcium   Date Value Ref Range Status   01/16/2025 9.6 8.4 - 10.2 mg/dL Final     Albumin   Date Value Ref Range Status   01/16/2025 3.9 3.4 - 4.8 g/dL Final     Bilirubin Total   Date Value Ref Range Status   01/16/2025 0.6 <=1.5 mg/dL Final     ALP   Date Value Ref Range Status   01/16/2025 49 40 - 150 unit/L Final     AST   Date Value Ref Range Status   01/16/2025 23 5 - 34 unit/L Final     ALT   Date Value Ref Range Status   01/16/2025 18 0 - 55 unit/L Final     Estimated GFR-Non    Date Value Ref Range Status   05/20/2022 >60 mls/min/1.73/m2 Final         Assessment:       No diagnosis found.         1.) Invasive lobular carcinoma right breast Dx 8/26//2024  ---ER 89.40%, [place] 82.10%, HER2 equivocal, 2+, amplified by fish, Ki 67, high proliferation, 57.30%   ---status post right lumpectomy with sentinel lymph node biopsy on 10/9/2024--Invasive Pleomorphic lobular carcinoma   ---T2 N0 M0 stage IB, G3, ER/ME/HER2 positive    Review with her and daughter again today; diagnosis, prognosis and treatment recommendations according to NCCN guidelines.  Even though she is an elderly patient she will benefit of Herceptin base regimen followed by hormonal therapy once chemotherapy completed.  Because of her age recommend taxane with Herceptin.  Patient is adamant that she will not have chemotherapy or radiation therapy (not indicated at her age, also LN neg).  She does not opposed to try Herceptin and aromatase inhibitor.    Patient with osteopenia, therefore we will start Prolia with letrozole for bone health and to decreased risk of pathological fractures and development of osteoporosis    2.) pT2pN0M0--Stage I colon cancer--diagnosed in April 2021   -3/15/2021:Colonoscopy: malignant partially obstructing tumor in the ascending colon. Biopsy tubular adenoma with at least high-grade dysplasia.    -4/22/2021:  Laparoscopic/robotic right hemicolectomy    -Path: Adenocarcinoma superficially invades muscularis propria. Eighteen mesenteric lymph nodes, no neoplasm (0/18).     NCCN guidelines (COL-3) for pathological stage T2, N0, M0   Surveillance (COL-8): colonoscopy in 1 year after surgery and then in 1 year for advanced adenoma, if no advanced adenoma then repeat in 3 yrs and then q 5 yrs.   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  These are benign lesions.    4) Liver disease--Cirrhosis of the liver with Splenomegaly and ascites. Followed by GI    5) Heart disease- s/p stent placement  --ECHO with normal EF 60-65%        Plan:       Dx:Invasive lobular carcinoma right breast Dx 8/26//2024  Status:  Undergoing active treatment  See the assessment section for details about the disease.  Patient is undergoing treatment with Herceptin and aromatase inhibitor in the form of letrozole.  Today the patient will be due for cycle 5 trastuzumab  I review laboratory evaluation today which is appropriate to proceed with treatment.  I reviewed side effects and signs and symptoms to report.  Baseline echocardiogram 10/17/24, LVEF 60-65%  Repeat echocardiogram 12/18/2024, LVEF  60-65%  We will continue to do echocardiograms every 3 month for as long as the patient is on Herceptin after completion of Herceptin we will do echocardiograms every 6-month for a total of 24 month.  Patient will return in 3 weeks treatment.  Patient is going on vacation after the next cycle and wonders if she can do treatment 1 day early which is okay from Hematology Oncology point of view.  Discussed with Dr. Kapadia who agrees.  Dx:pT2pN0M0--Stage I colon cancer--diagnosed in April 2021  Status:CLAUDIO  Referred to the assessment patient for diagnosis data.  Patient is almost 4 years out since  diagnosis.  We will check CT of the chest, abdomen, pelvis in September 2025.  Denies any changes in bowel habits.  Dx:Liver disease  Status:  Ongoing  Patient is followed by GI  She has cirrhosis of the liver with splenomegaly and ascites.  Continue follow-up with GI  Dx:  Heart disease  Status:  Ongoing  Status post stent placement.  Most recent echocardiogram on December 18th 2024 shows ejection fraction = 60-65%  Continue follow-up with cardiologist  Dx:  Leukopenia  Status:  Stable  Patient does have a very minimal leukopenia with a white blood cell count = 4.46  No interventions required  Dx:  Thrombocytopenia  Status:  slightly worsened  Platelet count = 83,000, previously 124,000.  Likely secondary to liver disease   Patient denies bleeding or abnormal bruising  We will continue to monitor  Dx:  Osteopenia  Status:  Ongoing  Continue calcium and vitamin-D  We will continue Prolia injections every six-month            Patient instructions and visit orders.       -Follow-up:  F/U with NP- 2/27/2025   -Labs:  CBC, CMP, LDH, CA 27.29- 2/27/2025   -Imaging:  Echocardiogram- Schedule for Mid March 2025  -Other orders:      46 were spent on this encounter    Jonn Noguera, MSN, FNP-BC, APRN, AOCNP   Department of Hematology/Oncology.

## 2025-02-07 LAB — CANCER AG27-29 SERPL-ACNC: 22.6 U/ML

## 2025-02-17 ENCOUNTER — TELEPHONE (OUTPATIENT)
Dept: FAMILY MEDICINE | Facility: CLINIC | Age: 85
End: 2025-02-17
Payer: MEDICARE

## 2025-02-17 RX ORDER — SPIRONOLACTONE 25 MG/1
12.5 TABLET ORAL DAILY
Qty: 45 TABLET | Refills: 1 | Status: SHIPPED | OUTPATIENT
Start: 2025-02-17 | End: 2025-08-16

## 2025-02-17 NOTE — TELEPHONE ENCOUNTER
----- Message from Adele sent at 2/17/2025  3:18 PM CST -----  Who Called: Pastor Ferreira (daughter)Refill or New Rx:RefillRX Name and Strength:spironolactone (ALDACTONE) 25 MG tablet How is the patient currently taking it? (ex. 1XDay):Is this a 30 day or 90 day RX:90Local or Mail Order:localList of preferred pharmacies on file (remove unneeded): [facility] #[id number] at [address] Phone: [phone]: [phone] different Pharmacy is requested, enter Pharmacy information here including location and phone number:  Ordering Provider:Preferred Method of Contact: Phone CallPatient's Preferred Phone Number on File: [phone] Best Call Back Number, if different:Additional Information: Felicitas states pt needs a refill on medication, states pharmacy is stating med refill was request was denied by clinic.

## 2025-02-17 NOTE — TELEPHONE ENCOUNTER
I have signed for the following orders AND/OR meds.  Please call the patient and ask the patient to schedule the testing AND/OR inform about any medications that were sent.        Medications Ordered This Encounter   Medications    spironolactone (ALDACTONE) 25 MG tablet     Sig: Take 0.5 tablets (12.5 mg total) by mouth once daily.     Dispense:  45 tablet     Refill:  1     .

## 2025-02-27 ENCOUNTER — LAB VISIT (OUTPATIENT)
Dept: LAB | Facility: HOSPITAL | Age: 85
End: 2025-02-27
Attending: NURSE PRACTITIONER
Payer: MEDICARE

## 2025-02-27 ENCOUNTER — OFFICE VISIT (OUTPATIENT)
Dept: HEMATOLOGY/ONCOLOGY | Facility: CLINIC | Age: 85
End: 2025-02-27
Payer: MEDICARE

## 2025-02-27 ENCOUNTER — INFUSION (OUTPATIENT)
Dept: INFUSION THERAPY | Facility: HOSPITAL | Age: 85
End: 2025-02-27
Attending: INTERNAL MEDICINE
Payer: MEDICARE

## 2025-02-27 VITALS
HEIGHT: 64 IN | SYSTOLIC BLOOD PRESSURE: 138 MMHG | DIASTOLIC BLOOD PRESSURE: 84 MMHG | BODY MASS INDEX: 28.7 KG/M2 | TEMPERATURE: 98 F | OXYGEN SATURATION: 96 % | RESPIRATION RATE: 18 BRPM | HEART RATE: 75 BPM | WEIGHT: 168.13 LBS

## 2025-02-27 DIAGNOSIS — C50.111 MALIGNANT NEOPLASM OF CENTRAL PORTION OF RIGHT BREAST IN FEMALE, ESTROGEN RECEPTOR POSITIVE: ICD-10-CM

## 2025-02-27 DIAGNOSIS — C18.9 MALIGNANT NEOPLASM OF COLON, UNSPECIFIED PART OF COLON: Primary | ICD-10-CM

## 2025-02-27 DIAGNOSIS — D69.6 THROMBOCYTOPENIA: ICD-10-CM

## 2025-02-27 DIAGNOSIS — Z51.81 ENCOUNTER FOR MONITORING CARDIOTOXIC DRUG THERAPY: ICD-10-CM

## 2025-02-27 DIAGNOSIS — M85.80 OSTEOPENIA, UNSPECIFIED LOCATION: ICD-10-CM

## 2025-02-27 DIAGNOSIS — C18.9 MALIGNANT NEOPLASM OF COLON, UNSPECIFIED PART OF COLON: ICD-10-CM

## 2025-02-27 DIAGNOSIS — Z17.0 MALIGNANT NEOPLASM OF CENTRAL PORTION OF RIGHT BREAST IN FEMALE, ESTROGEN RECEPTOR POSITIVE: ICD-10-CM

## 2025-02-27 DIAGNOSIS — C50.919 HER2-POSITIVE CARCINOMA OF BREAST: Primary | ICD-10-CM

## 2025-02-27 DIAGNOSIS — Z17.31 HER2-POSITIVE CARCINOMA OF BREAST: ICD-10-CM

## 2025-02-27 DIAGNOSIS — Z98.890 S/P LUMPECTOMY OF BREAST: ICD-10-CM

## 2025-02-27 DIAGNOSIS — D72.819 LEUKOPENIA, UNSPECIFIED TYPE: ICD-10-CM

## 2025-02-27 DIAGNOSIS — E03.9 HYPOTHYROIDISM, UNSPECIFIED TYPE: ICD-10-CM

## 2025-02-27 DIAGNOSIS — E55.9 VITAMIN D DEFICIENCY: ICD-10-CM

## 2025-02-27 DIAGNOSIS — Z79.899 ENCOUNTER FOR MONITORING CARDIOTOXIC DRUG THERAPY: ICD-10-CM

## 2025-02-27 DIAGNOSIS — Z17.31 HER2-POSITIVE CARCINOMA OF BREAST: Primary | ICD-10-CM

## 2025-02-27 DIAGNOSIS — C50.919 HER2-POSITIVE CARCINOMA OF BREAST: ICD-10-CM

## 2025-02-27 LAB
ALBUMIN SERPL-MCNC: 3.7 G/DL (ref 3.4–4.8)
ALBUMIN/GLOB SERPL: 1.2 RATIO (ref 1.1–2)
ALP SERPL-CCNC: 44 UNIT/L (ref 40–150)
ALT SERPL-CCNC: 24 UNIT/L (ref 0–55)
ANION GAP SERPL CALC-SCNC: 5 MEQ/L
AST SERPL-CCNC: 25 UNIT/L (ref 5–34)
BASOPHILS # BLD AUTO: 0.01 X10(3)/MCL
BASOPHILS NFR BLD AUTO: 0.3 %
BILIRUB SERPL-MCNC: 0.6 MG/DL
BUN SERPL-MCNC: 12.7 MG/DL (ref 9.8–20.1)
CALCIUM SERPL-MCNC: 10.1 MG/DL (ref 8.4–10.2)
CEA SERPL-MCNC: 2.43 NG/ML (ref 0–3)
CHLORIDE SERPL-SCNC: 107 MMOL/L (ref 98–107)
CO2 SERPL-SCNC: 30 MMOL/L (ref 23–31)
CREAT SERPL-MCNC: 0.77 MG/DL (ref 0.55–1.02)
CREAT/UREA NIT SERPL: 16
EOSINOPHIL # BLD AUTO: 0.17 X10(3)/MCL (ref 0–0.9)
EOSINOPHIL NFR BLD AUTO: 4.4 %
ERYTHROCYTE [DISTWIDTH] IN BLOOD BY AUTOMATED COUNT: 13.7 % (ref 11.5–17)
GFR SERPLBLD CREATININE-BSD FMLA CKD-EPI: >60 ML/MIN/1.73/M2
GLOBULIN SER-MCNC: 3.2 GM/DL (ref 2.4–3.5)
GLUCOSE SERPL-MCNC: 155 MG/DL (ref 82–115)
HCT VFR BLD AUTO: 37.5 % (ref 37–47)
HGB BLD-MCNC: 12.3 G/DL (ref 12–16)
IMM GRANULOCYTES # BLD AUTO: 0.02 X10(3)/MCL (ref 0–0.04)
IMM GRANULOCYTES NFR BLD AUTO: 0.5 %
LDH SERPL-CCNC: 164 U/L (ref 125–220)
LYMPHOCYTES # BLD AUTO: 1.13 X10(3)/MCL (ref 0.6–4.6)
LYMPHOCYTES NFR BLD AUTO: 29.3 %
MCH RBC QN AUTO: 29.5 PG (ref 27–31)
MCHC RBC AUTO-ENTMCNC: 32.8 G/DL (ref 33–36)
MCV RBC AUTO: 89.9 FL (ref 80–94)
MONOCYTES # BLD AUTO: 0.3 X10(3)/MCL (ref 0.1–1.3)
MONOCYTES NFR BLD AUTO: 7.8 %
NEUTROPHILS # BLD AUTO: 2.23 X10(3)/MCL (ref 2.1–9.2)
NEUTROPHILS NFR BLD AUTO: 57.7 %
PLATELET # BLD AUTO: 113 X10(3)/MCL (ref 130–400)
PMV BLD AUTO: 10.8 FL (ref 7.4–10.4)
POTASSIUM SERPL-SCNC: 4.4 MMOL/L (ref 3.5–5.1)
PROT SERPL-MCNC: 6.9 GM/DL (ref 5.8–7.6)
RBC # BLD AUTO: 4.17 X10(6)/MCL (ref 4.2–5.4)
SODIUM SERPL-SCNC: 142 MMOL/L (ref 136–145)
WBC # BLD AUTO: 3.86 X10(3)/MCL (ref 4.5–11.5)

## 2025-02-27 PROCEDURE — 99215 OFFICE O/P EST HI 40 MIN: CPT | Mod: PBBFAC | Performed by: NURSE PRACTITIONER

## 2025-02-27 PROCEDURE — 83615 LACTATE (LD) (LDH) ENZYME: CPT

## 2025-02-27 PROCEDURE — 63600175 PHARM REV CODE 636 W HCPCS: Mod: TB | Performed by: NURSE PRACTITIONER

## 2025-02-27 PROCEDURE — 82378 CARCINOEMBRYONIC ANTIGEN: CPT

## 2025-02-27 PROCEDURE — 96413 CHEMO IV INFUSION 1 HR: CPT

## 2025-02-27 PROCEDURE — 85025 COMPLETE CBC W/AUTO DIFF WBC: CPT

## 2025-02-27 PROCEDURE — 80053 COMPREHEN METABOLIC PANEL: CPT

## 2025-02-27 PROCEDURE — 99999 PR PBB SHADOW E&M-EST. PATIENT-LVL V: CPT | Mod: PBBFAC,,, | Performed by: NURSE PRACTITIONER

## 2025-02-27 PROCEDURE — 25000003 PHARM REV CODE 250: Performed by: NURSE PRACTITIONER

## 2025-02-27 PROCEDURE — 86300 IMMUNOASSAY TUMOR CA 15-3: CPT

## 2025-02-27 PROCEDURE — 36415 COLL VENOUS BLD VENIPUNCTURE: CPT

## 2025-02-27 RX ORDER — SODIUM CHLORIDE 0.9 % (FLUSH) 0.9 %
10 SYRINGE (ML) INJECTION
Status: CANCELLED | OUTPATIENT
Start: 2025-02-27

## 2025-02-27 RX ORDER — DIPHENHYDRAMINE HYDROCHLORIDE 50 MG/ML
50 INJECTION INTRAMUSCULAR; INTRAVENOUS ONCE AS NEEDED
Status: CANCELLED | OUTPATIENT
Start: 2025-02-27

## 2025-02-27 RX ORDER — DIPHENHYDRAMINE HYDROCHLORIDE 50 MG/ML
50 INJECTION INTRAMUSCULAR; INTRAVENOUS ONCE AS NEEDED
Status: DISCONTINUED | OUTPATIENT
Start: 2025-02-27 | End: 2025-02-27 | Stop reason: HOSPADM

## 2025-02-27 RX ORDER — EPINEPHRINE 0.3 MG/.3ML
0.3 INJECTION SUBCUTANEOUS ONCE AS NEEDED
Status: CANCELLED | OUTPATIENT
Start: 2025-02-27

## 2025-02-27 RX ORDER — HEPARIN 100 UNIT/ML
500 SYRINGE INTRAVENOUS
Status: CANCELLED | OUTPATIENT
Start: 2025-02-27

## 2025-02-27 RX ORDER — EPINEPHRINE 0.3 MG/.3ML
0.3 INJECTION SUBCUTANEOUS ONCE AS NEEDED
Status: DISCONTINUED | OUTPATIENT
Start: 2025-02-27 | End: 2025-02-27 | Stop reason: HOSPADM

## 2025-02-27 RX ORDER — SODIUM CHLORIDE 0.9 % (FLUSH) 0.9 %
10 SYRINGE (ML) INJECTION
Status: DISCONTINUED | OUTPATIENT
Start: 2025-02-27 | End: 2025-02-27 | Stop reason: HOSPADM

## 2025-02-27 RX ORDER — HEPARIN 100 UNIT/ML
500 SYRINGE INTRAVENOUS
Status: DISCONTINUED | OUTPATIENT
Start: 2025-02-27 | End: 2025-02-27 | Stop reason: HOSPADM

## 2025-02-27 RX ADMIN — TRASTUZUMAB-ANNS 456 MG: 420 INJECTION, POWDER, LYOPHILIZED, FOR SOLUTION INTRAVENOUS at 11:02

## 2025-02-27 RX ADMIN — SODIUM CHLORIDE: 9 INJECTION, SOLUTION INTRAVENOUS at 11:02

## 2025-02-27 NOTE — PLAN OF CARE
Plan of care reviewed with patient. C6 completed. Appts reviewed with patient; patient uses portal.

## 2025-02-27 NOTE — PROGRESS NOTES
HEMATOLOGY/ONCOLOGY OFFICE CLINIC VISIT    Visit Information:    Initial Evaluation: 5/26/2021  Referring Provider: Dr. Robert Conner  Other providers:  Code status:  Not addressed    Diagnosis:  1) T2N0M0-Stage I Colon cancer. 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   Letrozole started 11/2024    Treatment/Oncology history:  -03/15/2021: Colonoscopy: malignant partially obstructing tumor in the ascending colon. Biopsy tubular adenoma with at least high-grade dysplasia.   -04/22/2021: Laparoscopic/robotic right hemicolectomy   -08/15/2024: Abnormal right breast mammogram BI-RADS category 4, suspicious  -08/26/2024: Ultrasound-guided right breast biopsy at 1:00 a.m. position--> invasive lobular carcinoma  -10/09/2024: s/p right lumpectomy with sentinel lymph node biopsy       Imaging:  CT A/P 3/17/2021 at [facility]: irregular colonic mass 5 to 6 cm in length proximal to midportion of descending colon.  Enhancing soft tissue component contacts and may invade the right lateral abdominal wall.  No regional or distant lymphadenopathy identified.  Cirrhotic liver with portal hypertension, small volume ascites and splenomegaly.  No focal lesion identified in the liver.    CT of the thorax 4/5/2021: no thoracic metastatic disease identified.  Small volume ascites and borderline splenomegaly.No adenopathy or distant metastases.  CT angiogram 5/20/2021: negative for pulmonary embolism but lungs demonstrate mild heterogeneous attenuation with subtle peripheral reticular opacities.  Primary differential consideration is small airways disease.  Findings may be secondary to multifocal infiltrate from infection versus pulmonary edema.  It also showed moderate ascites with increase in size since prior examination.  Liver  with nodular contour and was enlarged.  Spleen borderline enlarged.  A 1.3 x 1 cm enhancing nodule in the left adrenal gland no significant change.  Ultrasound of the abdomen 5/23/2021:cirrhosis of the liver with hepatosplenomegaly and ascites.   Ultrasound of the abdomen 10/26/2022: The pancreas appears grossly unremarkable.  No pancreatic mass or lesion is seen. liver is slightly enlarged in size. Liver is echogenic it measures 16 cm by my measurements..  No liver mass or lesion is seen. spleen appears enlarged and measures 14.5 cm.  Hepatomegaly with findings consistent with hepatic steatosis  CT C/A/P 2/16/2023:     1. No evidence of metastatic disease within the chest, abdomen and pelvis  2. Cirrhotic morphology of the liver  3. Changes of portal hypertension including borderline splenomegaly and portosystemic collateral  4. Mild interstitial scarring within the lungs.  CT C/A/P  8/28/2023:  No new suspicious findings chest, abdomen or pelvis.  Chronic findings above.  CT C/A/P 2/21/2024: No detrimental change identified since 08/28/2023.   CT 8/22/2024: Similar mild chronic changes in the lungs with no suspicious pulmonary nodule.  Moderate coronary artery calcification.  No pleural or pericardial effusion.  No pathologically enlarged thoracic lymph node. Cirrhosis with mild hepatic steatosis.  Similar mild splenomegaly.  Stable pancreas, adrenal glands and kidneys.  Very small hiatal hernia.  Normal caliber small bowel and colon with right mid abdominal ileal colonic anastomosis.  Distal colonic diverticulosis.  Normal bladder.  No ascites or abdominal/pelvic lymphadenopathy.  Aortoiliac atherosclerosis. No aggressive osseous lesion.No metastatic disease identified.     ECHO:  10/17/24, LVEF 60-65%  02/18/24, LRKR84-75%    Bone Density:  8/9/24 @ [facility]: Osteopenia      Pathology:  4/22/2022:   RIGHT COLON, HEMICOLECTOMY: ADENOCARCINOMA, WELL DIFFERENTIATED, 90% MUCINOUS.   --  Greatest tumor dimension, 7.0 cm  (as measured grossly).  --  Adenocarcinoma arises from tubular adenoma with high grade dysplasia.  --  Lymphovascular invasion, not identified.  --  Adenocarcinoma superficially invades muscularis propria.  --  Eighteen mesenteric lymph nodes, no neoplasm (0/18).  --  Surgical margins, uninvolved.  POSTOPERATIVE SPINDLE CELL PSEUDOTUMOR, 1.5 CM.  -  The pseudotumor extends into mesenteric adipose tissue.     9/24/2021:  LIVER, CORE NEEDLE BIOPSY: FOCAL PORTAL TRIADITIS WITH SINUS DILATION.   - NO INTERFACE ACTIVITY.   - BRIDING FIBROSIS (FIBROSIS STAGE  F2-3)  - NO STEATOSIS.  Comment: Despite hepatitis testing demonstrating reactivity for hepatitis A in July 2021, no evidence of acute hepatitis is identified and LFTs are currently within normal limits. This case has also been reviewed in intradepartmental consultation.    8/26/2024:  RIGHT BREAST, 1 O'CLOCK MASS ULTRASOUND GUIDED VACUUM BIOPSY:  - Invasive carcinoma with lobular features, Xavier Grade 3, see comment.  - Carcinoma involving 3 of 4 cores, 7 mm in longest linear extent.  ER 89.40%, PA 82.10%, HER2 equivocal, 2+, amplified by fish, Ki 67, high proliferation, 57.30%    10/9/2024:  1. BREAST, RIGHT, LUMPECTOMY:   PLEOMORPHIC LOBULAR CARCINOMA   DUCTAL CARCINOMA IN SITU WITH LOBULAR EXTENSION.   2. BREAST, SUPERIOR MARGIN, REEXCISION: NEGATIVE FOR INVASIVE AND IN SITU CARCINOMA.   3. AXILLA, ADDITIONAL AXILLA CONTENTS, EXCISION: TWO LYMPH NODES, NEGATIVE FOR METASTATIC CARCINOMA.   4. LYMPH NODE, RIGHT SENTINEL #1, EXCISION: ONE LYMPH NODE, NEGATIVE FOR METASTATIC CARCINOMA.   5. LYMPH NODE, RIGHT SENTINEL #2, EXCISION: ONE LYMPH NODE, NEGATIVE FOR METASTATIC CARCINOMA.   6. LYMPH NODES, RIGHT SENTINEL #3, EXCISION: TWO LYMPH NODES, NEGATIVE FOR METASTATIC CARCINOMA.     TUMOR Histologic Type:  Invasive carcinoma with features of : Pleomorphic lobular   Histologic Grade (C[place]on Histologic Score):  Score 3   Tumor Size: 21 mm   Tumor Focality:   Single focus of invasive carcinoma   Ductal Carcinoma In Situ (DCIS):  Present   Lymphovascular Invasion:  Cannot be determined: Suspicious for LVI   MARGINS:  All margins negative for invasive carcinoma   REGIONAL LYMPH NODES   Total Number of Lymph Nodes Examined (sentinel and non-sentinel):  Exact number : 5   Number of [place] Nodes Examined:  Exact number : 3      PATHOLOGIC STAGE CLASSIFICATION (pTNM, AJCC 8th Edition)   pT2pN0           CLINICAL HISTORY:       Patient: 85 year old female with multiple medical problems kindly referred for colon cancer.  She initially presented with anemia and fecal occult blood positive.  She also reports 20 pounds weight loss over the last previous 6 months. Colonoscopy on 3/15/2021 revealed a malignant partially obstructing tumor in the ascending colon. Biopsy showed tubular adenoma with at least high-grade dysplasia.  On 3/17/2021 she underwent a CT scan of the abdomen and pelvis at [facility] imaging that showed an irregular colonic mass measuring about 5 to 6 cm in length involving proximal to midportion of descending colon.  Enhancing soft tissue component contacts and may invade the right lateral abdominal wall.  No regional or distant lymphadenopathy identified.  Cirrhotic liver with portal hypertension, small volume ascites and splenomegaly.  No focal lesion identified in the liver.  Staging CT of the thorax 4/5/2021 with no thoracic metastatic disease identified.  Small volume ascites and borderline splenomegaly.No adenopathy or distant metastases.   Patient was seen by Dr. Robert Conner and she underwent laparoscopic/robotic right hemicolectomy on 4/22/2021.  Pathology report as follows:   RIGHT COLON, HEMICOLECTOMY: ADENOCARCINOMA, WELL DIFFERENTIATED, 90% MUCINOUS. 0/18 LN positive for malignanacy.See above for details.  Patient recovered from the surgery but came back to the emergency department for chest pain. She underwent CT angiogram that was negative for  pulmonary embolism but lungs demonstrate mild heterogeneous attenuation with subtle peripheral reticular opacities.  Primary differential consideration is small airways disease.  Findings may be secondary to multifocal infiltrate from infection versus pulmonary edema.  It also showed moderate ascites with increase in size since prior examination.  Liver with nodular contour and was enlarged.  Spleen borderline enlarged.  1.3 x 1 cm enhancing nodule in the left adrenal gland no significant change.  She underwent ultrasound of the abdomen that confirmed the diagnosis of cirrhosis of the liver with hepatosplenomegaly and ascites.  She was discharged on 5/20/2021.  She stopped drinking at age 38, she says that she is a social drinker. She denies any family history of colon cancer. She denies any fever, chills, or sweats. No chest pain or shortness of breath.  She had liver biopsy ordered by Dr. Louis on 9/24/21. It was ordered for cirrhosis. She has no history of hepatitis infection or alcohol abuse. Biopsy was negative for malignancy. Just  fibrosis.              Chief Complaint: 3 Week Follow Up      Interval History:  Patient presents today for follow up. She underwent right breast lumpectomy with biopsy on 10/9/24 with Dr. Conner.   Herceptin and letrozole and so far tolerating treatment well.     Interval history  2/27/2025:  On today's visit the patient presents to the office for follow-up and monitoring of her breast cancer on active treatment with Herceptin and letrozole.  The patient denies headache, diarrhea, nausea, and chills.  She also denies any bloating the stool changes in bowel habits or GI distress.        ROS: All 14 points ROS taken and as per Interval History  Review of Systems   Constitutional:  Negative for chills, fever and weight loss.   HENT:  Negative for congestion and nosebleeds.    Eyes:  Negative for blurred vision, double vision and photophobia.   Respiratory:  Negative for cough,  "hemoptysis and shortness of breath.    Cardiovascular:  Negative for chest pain, palpitations, leg swelling and PND.   Gastrointestinal:  Positive for diarrhea. Negative for abdominal pain, blood in stool, constipation, melena, nausea and vomiting.   Genitourinary:  Negative for dysuria, frequency, hematuria and urgency.   Musculoskeletal:  Negative for back pain, falls and myalgias.   Skin:  Negative for itching and rash.   Neurological:  Negative for tremors, focal weakness, seizures, weakness and headaches.   Endo/Heme/Allergies:  Negative for environmental allergies. Does not bruise/bleed easily.   Psychiatric/Behavioral:  Negative for depression and suicidal ideas. The patient is nervous/anxious.      Histories:  PMH/PSH/FH/SOCIAL/ALLERGIES AND MEDS REVIEWED AND UPDATED AS APPROPRIATE       Vitals:    02/27/25 1027   BP: 138/84   BP Location: Left arm   Patient Position: Sitting   Pulse: 75   Resp: 18   Temp: 98.1 °F (36.7 °C)   TempSrc: Oral   SpO2: 96%   Weight: 76.2 kg (168 lb 1.6 oz)   Height: 5' 4.14" (1.629 m)       Wt Readings from Last 6 Encounters:   02/27/25 76.2 kg (168 lb 1.6 oz)   02/06/25 76 kg (167 lb 8.1 oz)   02/06/25 76 kg (167 lb 8 oz)   01/16/25 75.9 kg (167 lb 6.4 oz)   12/26/24 76.3 kg (168 lb 4.8 oz)   12/05/24 75.8 kg (167 lb 1.7 oz)     Body mass index is 28.73 kg/m².  Body surface area is 1.86 meters squared.       Physical Exam  Constitutional:       Appearance: Normal appearance.   HENT:      Head: Normocephalic and atraumatic.   Eyes:      General: No scleral icterus.     Extraocular Movements: Extraocular movements intact.      Conjunctiva/sclera: Conjunctivae normal.   Neck:      Vascular: No JVD.   Cardiovascular:      Rate and Rhythm: Normal rate and regular rhythm.      Heart sounds: No murmur heard.  Pulmonary:      Effort: Pulmonary effort is normal.      Breath sounds: Normal breath sounds. No wheezing or rhonchi.   Chest:   Breasts:     Right: Normal. No swelling, mass, " nipple discharge, skin change or tenderness.      Left: No swelling, mass, nipple discharge, skin change or tenderness.      Comments: Surgical sites well healed  Abdominal:      General: Bowel sounds are normal. There is no distension.      Palpations: Abdomen is soft. There is no mass.      Tenderness: There is no abdominal tenderness.   Musculoskeletal:      Cervical back: Neck supple.   Lymphadenopathy:      Head:      Right side of head: No submental or submandibular adenopathy.      Left side of head: No submental or submandibular adenopathy.      Cervical: No cervical adenopathy.      Upper Body:      Right upper body: No supraclavicular or axillary adenopathy.      Left upper body: No supraclavicular or axillary adenopathy.      Lower Body: No right inguinal adenopathy. No left inguinal adenopathy.   Skin:     General: Skin is warm.      Coloration: Skin is not jaundiced.      Findings: No lesion or rash.      Nails: There is no clubbing.   Neurological:      Mental Status: She is alert and oriented to person, place, and time.      Cranial Nerves: Cranial nerves 2-12 are intact.   Psychiatric:         Attention and Perception: Attention normal.         Behavior: Behavior is cooperative.         Judgment: Judgment normal.       ECOG SCORE             Laboratory:  CBC with Differential:  Lab Results   Component Value Date    WBC 3.86 (L) 02/27/2025    RBC 4.17 (L) 02/27/2025    HGB 12.3 02/27/2025    HCT 37.5 02/27/2025    MCV 89.9 02/27/2025    MCH 29.5 02/27/2025    MCHC 32.8 (L) 02/27/2025    RDW 13.7 02/27/2025     (L) 02/27/2025    MPV 10.8 (H) 02/27/2025        CMP:  Sodium   Date Value Ref Range Status   02/27/2025 142 136 - 145 mmol/L Final     Potassium   Date Value Ref Range Status   02/27/2025 4.4 3.5 - 5.1 mmol/L Final     Chloride   Date Value Ref Range Status   02/27/2025 107 98 - 107 mmol/L Final     CO2   Date Value Ref Range Status   02/27/2025 30 23 - 31 mmol/L Final     Blood Urea  Nitrogen   Date Value Ref Range Status   02/27/2025 12.7 9.8 - 20.1 mg/dL Final     Creatinine   Date Value Ref Range Status   02/27/2025 0.77 0.55 - 1.02 mg/dL Final     Calcium   Date Value Ref Range Status   02/27/2025 10.1 8.4 - 10.2 mg/dL Final     Albumin   Date Value Ref Range Status   02/27/2025 3.7 3.4 - 4.8 g/dL Final     Bilirubin Total   Date Value Ref Range Status   02/27/2025 0.6 <=1.5 mg/dL Final     ALP   Date Value Ref Range Status   02/27/2025 44 40 - 150 unit/L Final     AST   Date Value Ref Range Status   02/27/2025 25 5 - 34 unit/L Final     ALT   Date Value Ref Range Status   02/27/2025 24 0 - 55 unit/L Final     Estimated GFR-Non    Date Value Ref Range Status   05/20/2022 >60 mls/min/1.73/m2 Final         Assessment:       No diagnosis found.         1.) Invasive lobular carcinoma right breast Dx 8/26//2024  ---ER 89.40%, OH 82.10%, HER2 equivocal, 2+, amplified by fish, Ki 67, high proliferation, 57.30%   ---status post right lumpectomy with sentinel lymph node biopsy on 10/9/2024--Invasive Pleomorphic lobular carcinoma   ---T2 N0 M0 stage IB, G3, ER/OH/HER2 positive    Review with her and daughter again today; diagnosis, prognosis and treatment recommendations according to NCCN guidelines.  Even though she is an elderly patient she will benefit of Herceptin base regimen followed by hormonal therapy once chemotherapy completed.  Because of her age recommend taxane with Herceptin.  Patient is adamant that she will not have chemotherapy or radiation therapy (not indicated at her age, also LN neg).  She does not opposed to try Herceptin and aromatase inhibitor.    Patient with osteopenia, therefore we will start Prolia with letrozole for bone health and to decreased risk of pathological fractures and development of osteoporosis    2.) pT2pN0M0--Stage I colon cancer--diagnosed in April 2021   -3/15/2021:Colonoscopy: malignant partially obstructing tumor in the ascending colon.  Biopsy tubular adenoma with at least high-grade dysplasia.    -4/22/2021: Laparoscopic/robotic right hemicolectomy    -Path: Adenocarcinoma superficially invades muscularis propria. Eighteen mesenteric lymph nodes, no neoplasm (0/18).     NCCN guidelines (COL-3) for pathological stage T2, N0, M0   Surveillance (COL-8): colonoscopy in 1 year after surgery and then in 1 year for advanced adenoma, if no advanced adenoma then repeat in 3 yrs and then q 5 yrs.   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  These are benign lesions.    4) Liver disease--Cirrhosis of the liver with Splenomegaly and ascites. Followed by GI    5) Heart disease- s/p stent placement  --ECHO with normal EF 60-65%        Plan:       Dx:Invasive lobular carcinoma right breast Dx 8/26//2024  Status:  Undergoing active treatment  See the assessment section for details about the disease.  Patient is undergoing treatment with Herceptin and aromatase inhibitor in the form of letrozole.  Today the patient will be due for cycle 6 trastuzumab  I review laboratory evaluation today which is appropriate to proceed with treatment.  I reviewed side effects and signs and symptoms to report.  Baseline echocardiogram 10/17/24, LVEF 60-65%  Repeat echocardiogram 12/18/2024, LVEF  60-65%  We will continue to do echocardiograms every 3 month for as long as the patient is on Herceptin after completion of Herceptin we will do echocardiograms every 6-month for a total of 24 month.  Patient will return in 3 weeks treatment.  We will do the next cycle of Herceptin 1 day early because the patient is going on vacation.  Dx:pT2pN0M0--Stage I colon cancer--diagnosed in April 2021  Status:CLAUDIO  Referred to the assessment patient for diagnosis data.  Patient is almost 4 years out since diagnosis.  We will check  CT of the chest, abdomen, pelvis in September 2025.  Denies any changes in bowel habits.  Dx:Liver disease  Status:  Ongoing  Patient is followed by GI  She has cirrhosis of the liver with splenomegaly and ascites.  Continue follow-up with GI  Dx:  Heart disease  Status:  Ongoing  Status post stent placement.  Most recent echocardiogram on December 18th 2024 shows ejection fraction = 60-65%  Continue follow-up with cardiologist  Dx:  Leukopenia  Status:  Stable/slightly decreased  Patient does have a very minimal leukopenia with a white blood cell count = 3.86  No interventions required  Dx:  Thrombocytopenia  Status:  slightly worsened  Platelet count = 113,000, previously 83,000.  Likely secondary to liver disease   Patient denies bleeding or abnormal bruising  We will continue to monitor  Dx:  Osteopenia  Status:  Ongoing  Continue calcium and vitamin-D  We will continue Prolia injections every six-month            Patient instructions and visit orders.       -Follow-up:  F/U with NP- 3/19/2025  -Labs:  CBC, CMP, LDH, CA 27.29- 3/19/2025  -Imaging:  Echocardiogram- Schedule for Mid March 2025  -Other orders:  Treatment, Herceptin-3/19/2025    32 were spent on this encounter    Jonn Noguera, MSN, FNP-BC, APRN, AOCNP   Department of Hematology/Oncology.

## 2025-02-28 ENCOUNTER — PATIENT MESSAGE (OUTPATIENT)
Dept: LAB | Facility: HOSPITAL | Age: 85
End: 2025-02-28
Payer: MEDICARE

## 2025-02-28 ENCOUNTER — PATIENT MESSAGE (OUTPATIENT)
Dept: HEMATOLOGY/ONCOLOGY | Facility: CLINIC | Age: 85
End: 2025-02-28
Payer: MEDICARE

## 2025-02-28 LAB — CANCER AG27-29 SERPL-ACNC: 25.7 U/ML

## 2025-03-08 DIAGNOSIS — Z17.0 MALIGNANT NEOPLASM OF CENTRAL PORTION OF RIGHT BREAST IN FEMALE, ESTROGEN RECEPTOR POSITIVE: ICD-10-CM

## 2025-03-08 DIAGNOSIS — C18.2 MALIGNANT NEOPLASM OF ASCENDING COLON: ICD-10-CM

## 2025-03-08 DIAGNOSIS — Z17.31 HER2-POSITIVE CARCINOMA OF BREAST: ICD-10-CM

## 2025-03-08 DIAGNOSIS — Z98.890 S/P LUMPECTOMY OF BREAST: ICD-10-CM

## 2025-03-08 DIAGNOSIS — C50.111 MALIGNANT NEOPLASM OF CENTRAL PORTION OF RIGHT BREAST IN FEMALE, ESTROGEN RECEPTOR POSITIVE: ICD-10-CM

## 2025-03-08 DIAGNOSIS — C50.919 HER2-POSITIVE CARCINOMA OF BREAST: ICD-10-CM

## 2025-03-10 RX ORDER — LETROZOLE 2.5 MG/1
TABLET, FILM COATED ORAL
Qty: 30 TABLET | Refills: 0 | Status: SHIPPED | OUTPATIENT
Start: 2025-03-10

## 2025-03-19 ENCOUNTER — OFFICE VISIT (OUTPATIENT)
Dept: HEMATOLOGY/ONCOLOGY | Facility: CLINIC | Age: 85
End: 2025-03-19
Payer: MEDICARE

## 2025-03-19 ENCOUNTER — INFUSION (OUTPATIENT)
Dept: INFUSION THERAPY | Facility: HOSPITAL | Age: 85
End: 2025-03-19
Attending: INTERNAL MEDICINE
Payer: MEDICARE

## 2025-03-19 VITALS
RESPIRATION RATE: 18 BRPM | HEART RATE: 67 BPM | DIASTOLIC BLOOD PRESSURE: 53 MMHG | OXYGEN SATURATION: 99 % | BODY MASS INDEX: 28.46 KG/M2 | SYSTOLIC BLOOD PRESSURE: 138 MMHG | WEIGHT: 166.69 LBS | HEIGHT: 64 IN | TEMPERATURE: 98 F

## 2025-03-19 VITALS — WEIGHT: 166.69 LBS | HEIGHT: 64 IN | BODY MASS INDEX: 28.46 KG/M2

## 2025-03-19 DIAGNOSIS — Z17.31 HER2-POSITIVE CARCINOMA OF BREAST: Primary | ICD-10-CM

## 2025-03-19 DIAGNOSIS — E03.9 HYPOTHYROIDISM, UNSPECIFIED TYPE: ICD-10-CM

## 2025-03-19 DIAGNOSIS — M85.80 OSTEOPENIA, UNSPECIFIED LOCATION: ICD-10-CM

## 2025-03-19 DIAGNOSIS — Z79.899 ENCOUNTER FOR MONITORING CARDIOTOXIC DRUG THERAPY: ICD-10-CM

## 2025-03-19 DIAGNOSIS — C50.111 MALIGNANT NEOPLASM OF CENTRAL PORTION OF RIGHT BREAST IN FEMALE, ESTROGEN RECEPTOR POSITIVE: ICD-10-CM

## 2025-03-19 DIAGNOSIS — D72.819 LEUKOPENIA, UNSPECIFIED TYPE: ICD-10-CM

## 2025-03-19 DIAGNOSIS — Z17.0 MALIGNANT NEOPLASM OF CENTRAL PORTION OF RIGHT BREAST IN FEMALE, ESTROGEN RECEPTOR POSITIVE: ICD-10-CM

## 2025-03-19 DIAGNOSIS — C50.919 HER2-POSITIVE CARCINOMA OF BREAST: Primary | ICD-10-CM

## 2025-03-19 DIAGNOSIS — Z17.31 HER2-POSITIVE CARCINOMA OF BREAST: ICD-10-CM

## 2025-03-19 DIAGNOSIS — C50.919 HER2-POSITIVE CARCINOMA OF BREAST: ICD-10-CM

## 2025-03-19 DIAGNOSIS — E55.9 VITAMIN D DEFICIENCY: ICD-10-CM

## 2025-03-19 DIAGNOSIS — Z98.890 S/P LUMPECTOMY OF BREAST: ICD-10-CM

## 2025-03-19 DIAGNOSIS — D69.6 THROMBOCYTOPENIA: ICD-10-CM

## 2025-03-19 DIAGNOSIS — Z51.81 ENCOUNTER FOR MONITORING CARDIOTOXIC DRUG THERAPY: ICD-10-CM

## 2025-03-19 DIAGNOSIS — C18.9 MALIGNANT NEOPLASM OF COLON, UNSPECIFIED PART OF COLON: ICD-10-CM

## 2025-03-19 PROCEDURE — 63600175 PHARM REV CODE 636 W HCPCS: Mod: TB | Performed by: NURSE PRACTITIONER

## 2025-03-19 PROCEDURE — 99999 PR PBB SHADOW E&M-EST. PATIENT-LVL V: CPT | Mod: PBBFAC,,, | Performed by: NURSE PRACTITIONER

## 2025-03-19 PROCEDURE — 99215 OFFICE O/P EST HI 40 MIN: CPT | Mod: PBBFAC | Performed by: NURSE PRACTITIONER

## 2025-03-19 PROCEDURE — 25000003 PHARM REV CODE 250: Performed by: NURSE PRACTITIONER

## 2025-03-19 PROCEDURE — 96413 CHEMO IV INFUSION 1 HR: CPT

## 2025-03-19 RX ORDER — SODIUM CHLORIDE 0.9 % (FLUSH) 0.9 %
10 SYRINGE (ML) INJECTION
Status: CANCELLED | OUTPATIENT
Start: 2025-03-19

## 2025-03-19 RX ORDER — DIPHENHYDRAMINE HYDROCHLORIDE 50 MG/ML
50 INJECTION, SOLUTION INTRAMUSCULAR; INTRAVENOUS ONCE AS NEEDED
Status: CANCELLED | OUTPATIENT
Start: 2025-03-19

## 2025-03-19 RX ORDER — EPINEPHRINE 0.3 MG/.3ML
0.3 INJECTION SUBCUTANEOUS ONCE AS NEEDED
Status: CANCELLED | OUTPATIENT
Start: 2025-03-19

## 2025-03-19 RX ORDER — SODIUM CHLORIDE 0.9 % (FLUSH) 0.9 %
10 SYRINGE (ML) INJECTION
Status: DISCONTINUED | OUTPATIENT
Start: 2025-03-19 | End: 2025-03-19 | Stop reason: HOSPADM

## 2025-03-19 RX ORDER — HEPARIN 100 UNIT/ML
500 SYRINGE INTRAVENOUS
Status: CANCELLED | OUTPATIENT
Start: 2025-03-19

## 2025-03-19 RX ORDER — ESCITALOPRAM OXALATE 10 MG/1
10 TABLET ORAL DAILY
Qty: 90 TABLET | Refills: 3 | Status: SHIPPED | OUTPATIENT
Start: 2025-03-19 | End: 2026-03-19

## 2025-03-19 RX ORDER — HEPARIN 100 UNIT/ML
500 SYRINGE INTRAVENOUS
Status: DISCONTINUED | OUTPATIENT
Start: 2025-03-19 | End: 2025-03-19 | Stop reason: HOSPADM

## 2025-03-19 RX ORDER — DIPHENHYDRAMINE HYDROCHLORIDE 50 MG/ML
50 INJECTION, SOLUTION INTRAMUSCULAR; INTRAVENOUS ONCE AS NEEDED
Status: DISCONTINUED | OUTPATIENT
Start: 2025-03-19 | End: 2025-03-19 | Stop reason: HOSPADM

## 2025-03-19 RX ORDER — EPINEPHRINE 0.3 MG/.3ML
0.3 INJECTION SUBCUTANEOUS ONCE AS NEEDED
Status: DISCONTINUED | OUTPATIENT
Start: 2025-03-19 | End: 2025-03-19 | Stop reason: HOSPADM

## 2025-03-19 RX ADMIN — TRASTUZUMAB-ANNS 456 MG: 420 INJECTION, POWDER, LYOPHILIZED, FOR SOLUTION INTRAVENOUS at 02:03

## 2025-03-19 NOTE — PROGRESS NOTES
HEMATOLOGY/ONCOLOGY OFFICE CLINIC VISIT    Visit Information:    Initial Evaluation: 5/26/2021  Referring Provider: Dr. Robert Conner  Other providers:  Code status:  Not addressed    Diagnosis:  1) T2N0M0-Stage I Colon cancer. 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   Letrozole started 11/2024    Treatment/Oncology history:  -03/15/2021: Colonoscopy: malignant partially obstructing tumor in the ascending colon. Biopsy tubular adenoma with at least high-grade dysplasia.   -04/22/2021: Laparoscopic/robotic right hemicolectomy   -08/15/2024: Abnormal right breast mammogram BI-RADS category 4, suspicious  -08/26/2024: Ultrasound-guided right breast biopsy at 1:00 a.m. position--> invasive lobular carcinoma  -10/09/2024: s/p right lumpectomy with sentinel lymph node biopsy       Imaging:  CT A/P 3/17/2021 at [facility]: irregular colonic mass 5 to 6 cm in length proximal to midportion of descending colon.  Enhancing soft tissue component contacts and may invade the right lateral abdominal wall.  No regional or distant lymphadenopathy identified.  Cirrhotic liver with portal hypertension, small volume ascites and splenomegaly.  No focal lesion identified in the liver.    CT of the thorax 4/5/2021: no thoracic metastatic disease identified.  Small volume ascites and borderline splenomegaly.No adenopathy or distant metastases.  CT angiogram 5/20/2021: negative for pulmonary embolism but lungs demonstrate mild heterogeneous attenuation with subtle peripheral reticular opacities.  Primary differential consideration is small airways disease.  Findings may be secondary to multifocal infiltrate from infection versus pulmonary edema.  It also showed moderate ascites with increase in size since prior examination.  Liver  with nodular contour and was enlarged.  Spleen borderline enlarged.  A 1.3 x 1 cm enhancing nodule in the left adrenal gland no significant change.  Ultrasound of the abdomen 5/23/2021:cirrhosis of the liver with hepatosplenomegaly and ascites.   Ultrasound of the abdomen 10/26/2022: The pancreas appears grossly unremarkable.  No pancreatic mass or lesion is seen. liver is slightly enlarged in size. Liver is echogenic it measures 16 cm by my measurements..  No liver mass or lesion is seen. spleen appears enlarged and measures 14.5 cm.  Hepatomegaly with findings consistent with hepatic steatosis  CT C/A/P 2/16/2023:     1. No evidence of metastatic disease within the chest, abdomen and pelvis  2. Cirrhotic morphology of the liver  3. Changes of portal hypertension including borderline splenomegaly and portosystemic collateral  4. Mild interstitial scarring within the lungs.  CT C/A/P  8/28/2023:  No new suspicious findings chest, abdomen or pelvis.  Chronic findings above.  CT C/A/P 2/21/2024: No detrimental change identified since 08/28/2023.   CT 8/22/2024: Similar mild chronic changes in the lungs with no suspicious pulmonary nodule.  Moderate coronary artery calcification.  No pleural or pericardial effusion.  No pathologically enlarged thoracic lymph node. Cirrhosis with mild hepatic steatosis.  Similar mild splenomegaly.  Stable pancreas, adrenal glands and kidneys.  Very small hiatal hernia.  Normal caliber small bowel and colon with right mid abdominal ileal colonic anastomosis.  Distal colonic diverticulosis.  Normal bladder.  No ascites or abdominal/pelvic lymphadenopathy.  Aortoiliac atherosclerosis. No aggressive osseous lesion.No metastatic disease identified.     ECHO:  10/17/24, LVEF 60-65%  02/18/24, [id number]%    Bone Density:  8/9/24 @ [facility]: Osteopenia      Pathology:  4/22/2022:   RIGHT COLON, HEMICOLECTOMY: ADENOCARCINOMA, WELL DIFFERENTIATED, 90% MUCINOUS.   --  Greatest tumor dimension, 7.0 cm  (as measured grossly).  --  Adenocarcinoma arises from tubular adenoma with high grade dysplasia.  --  Lymphovascular invasion, not identified.  --  Adenocarcinoma superficially invades muscularis propria.  --  Eighteen mesenteric lymph nodes, no neoplasm (0/18).  --  Surgical margins, uninvolved.  POSTOPERATIVE SPINDLE CELL PSEUDOTUMOR, 1.5 CM.  -  The pseudotumor extends into mesenteric adipose tissue.     9/24/2021:  LIVER, CORE NEEDLE BIOPSY: FOCAL PORTAL TRIADITIS WITH SINUS DILATION.   - NO INTERFACE ACTIVITY.   - BRIDING FIBROSIS (FIBROSIS STAGE  F2-3)  - NO STEATOSIS.  Comment: Despite hepatitis testing demonstrating reactivity for hepatitis A in July 2021, no evidence of acute hepatitis is identified and LFTs are currently within normal limits. This case has also been reviewed in intradepartmental consultation.    8/26/2024:  RIGHT BREAST, 1 O'CLOCK MASS ULTRASOUND GUIDED VACUUM BIOPSY:  - Invasive carcinoma with lobular features, Xavier Grade 3, see comment.  - Carcinoma involving 3 of 4 cores, 7 mm in longest linear extent.  ER 89.40%, OK 82.10%, HER2 equivocal, 2+, amplified by fish, Ki 67, high proliferation, 57.30%    10/9/2024:  1. BREAST, RIGHT, LUMPECTOMY:   PLEOMORPHIC LOBULAR CARCINOMA   DUCTAL CARCINOMA IN SITU WITH LOBULAR EXTENSION.   2. BREAST, SUPERIOR MARGIN, REEXCISION: NEGATIVE FOR INVASIVE AND IN SITU CARCINOMA.   3. AXILLA, ADDITIONAL AXILLA CONTENTS, EXCISION: TWO LYMPH NODES, NEGATIVE FOR METASTATIC CARCINOMA.   4. LYMPH NODE, RIGHT SENTINEL #1, EXCISION: ONE LYMPH NODE, NEGATIVE FOR METASTATIC CARCINOMA.   5. LYMPH NODE, RIGHT SENTINEL #2, EXCISION: ONE LYMPH NODE, NEGATIVE FOR METASTATIC CARCINOMA.   6. LYMPH NODES, RIGHT SENTINEL #3, EXCISION: TWO LYMPH NODES, NEGATIVE FOR METASTATIC CARCINOMA.     TUMOR Histologic Type:  Invasive carcinoma with features of : Pleomorphic lobular   Histologic Grade ([place] Histologic Score):  Score 3   Tumor Size: 21 mm   Tumor Focality:   Single focus of invasive carcinoma   Ductal Carcinoma In Situ (DCIS):  Present   Lymphovascular Invasion:  Cannot be determined: Suspicious for LVI   MARGINS:  All margins negative for invasive carcinoma   REGIONAL LYMPH NODES   Total Number of Lymph Nodes Examined (sentinel and non-sentinel):  Exact number : 5   Number of [place] Nodes Examined:  Exact number : 3      PATHOLOGIC STAGE CLASSIFICATION (pTNM, AJCC 8th Edition)   pT2pN0           CLINICAL HISTORY:       Patient: 85 year old female with multiple medical problems kindly referred for colon cancer.  She initially presented with anemia and fecal occult blood positive.  She also reports 20 pounds weight loss over the last previous 6 months. Colonoscopy on 3/15/2021 revealed a malignant partially obstructing tumor in the ascending colon. Biopsy showed tubular adenoma with at least high-grade dysplasia.  On 3/17/2021 she underwent a CT scan of the abdomen and pelvis at [facility] that showed an irregular colonic mass measuring about 5 to 6 cm in length involving proximal to midportion of descending colon.  Enhancing soft tissue component contacts and may invade the right lateral abdominal wall.  No regional or distant lymphadenopathy identified.  Cirrhotic liver with portal hypertension, small volume ascites and splenomegaly.  No focal lesion identified in the liver.  Staging CT of the thorax 4/5/2021 with no thoracic metastatic disease identified.  Small volume ascites and borderline splenomegaly.No adenopathy or distant metastases.   Patient was seen by Dr. Robert Conner and she underwent laparoscopic/robotic right hemicolectomy on 4/22/2021.  Pathology report as follows:   RIGHT COLON, HEMICOLECTOMY: ADENOCARCINOMA, WELL DIFFERENTIATED, 90% MUCINOUS. 0/18 LN positive for malignanacy.See above for details.  Patient recovered from the surgery but came back to the emergency department for chest pain. She underwent CT angiogram that was negative for  pulmonary embolism but lungs demonstrate mild heterogeneous attenuation with subtle peripheral reticular opacities.  Primary differential consideration is small airways disease.  Findings may be secondary to multifocal infiltrate from infection versus pulmonary edema.  It also showed moderate ascites with increase in size since prior examination.  Liver with nodular contour and was enlarged.  Spleen borderline enlarged.  1.3 x 1 cm enhancing nodule in the left adrenal gland no significant change.  She underwent ultrasound of the abdomen that confirmed the diagnosis of cirrhosis of the liver with hepatosplenomegaly and ascites.  She was discharged on 5/20/2021.  She stopped drinking at age 38, she says that she is a social drinker. She denies any family history of colon cancer. She denies any fever, chills, or sweats. No chest pain or shortness of breath.  She had liver biopsy ordered by Dr. Louis on 9/24/21. It was ordered for cirrhosis. She has no history of hepatitis infection or alcohol abuse. Biopsy was negative for malignancy. Just  fibrosis.              Chief Complaint: No chief complaint on file.      Interval History:  Patient presents today for follow up. She underwent right breast lumpectomy with biopsy on 10/9/24 with Dr. Conner.   Herceptin and letrozole and so far tolerating treatment well.     Interval history  2/27/2025:  On today's visit the patient presents to the office for follow-up and monitoring of her breast cancer on active treatment with Herceptin and letrozole.  The patient denies headache, diarrhea, nausea, and chills.  She also denies any bloating the stool changes in bowel habits or GI distress.        ROS: All 14 points ROS taken and as per Interval History  Review of Systems   Constitutional:  Negative for chills, fever and weight loss.   HENT:  Negative for congestion and nosebleeds.    Eyes:  Negative for blurred vision, double vision and photophobia.   Respiratory:  Negative for  cough, hemoptysis and shortness of breath.    Cardiovascular:  Negative for chest pain, palpitations, leg swelling and PND.   Gastrointestinal:  Positive for diarrhea. Negative for abdominal pain, blood in stool, constipation, melena, nausea and vomiting.   Genitourinary:  Negative for dysuria, frequency, hematuria and urgency.   Musculoskeletal:  Negative for back pain, falls and myalgias.   Skin:  Negative for itching and rash.   Neurological:  Negative for tremors, focal weakness, seizures, weakness and headaches.   Endo/Heme/Allergies:  Negative for environmental allergies. Does not bruise/bleed easily.   Psychiatric/Behavioral:  Negative for depression and suicidal ideas. The patient is nervous/anxious.      Histories:  PMH/PSH/FH/SOCIAL/ALLERGIES AND MEDS REVIEWED AND UPDATED AS APPROPRIATE       There were no vitals filed for this visit.      Wt Readings from Last 6 Encounters:   02/27/25 76.2 kg (168 lb 1.6 oz)   02/06/25 76 kg (167 lb 8.1 oz)   02/06/25 76 kg (167 lb 8 oz)   01/16/25 75.9 kg (167 lb 6.4 oz)   12/26/24 76.3 kg (168 lb 4.8 oz)   12/05/24 75.8 kg (167 lb 1.7 oz)     There is no height or weight on file to calculate BMI.  There is no height or weight on file to calculate BSA.       Physical Exam  Constitutional:       Appearance: Normal appearance.   HENT:      Head: Normocephalic and atraumatic.   Eyes:      General: No scleral icterus.     Extraocular Movements: Extraocular movements intact.      Conjunctiva/sclera: Conjunctivae normal.   Neck:      Vascular: No JVD.   Cardiovascular:      Rate and Rhythm: Normal rate and regular rhythm.      Heart sounds: No murmur heard.  Pulmonary:      Effort: Pulmonary effort is normal.      Breath sounds: Normal breath sounds. No wheezing or rhonchi.   Chest:   Breasts:     Right: Normal. No swelling, mass, nipple discharge, skin change or tenderness.      Left: No swelling, mass, nipple discharge, skin change or tenderness.      Comments: Surgical  sites well healed  Abdominal:      General: Bowel sounds are normal. There is no distension.      Palpations: Abdomen is soft. There is no mass.      Tenderness: There is no abdominal tenderness.   Musculoskeletal:      Cervical back: Neck supple.   Lymphadenopathy:      Head:      Right side of head: No submental or submandibular adenopathy.      Left side of head: No submental or submandibular adenopathy.      Cervical: No cervical adenopathy.      Upper Body:      Right upper body: No supraclavicular or axillary adenopathy.      Left upper body: No supraclavicular or axillary adenopathy.      Lower Body: No right inguinal adenopathy. No left inguinal adenopathy.   Skin:     General: Skin is warm.      Coloration: Skin is not jaundiced.      Findings: No lesion or rash.      Nails: There is no clubbing.   Neurological:      Mental Status: She is alert and oriented to person, place, and time.      Cranial Nerves: Cranial nerves 2-12 are intact.   Psychiatric:         Attention and Perception: Attention normal.         Behavior: Behavior is cooperative.         Judgment: Judgment normal.       ECOG SCORE             Laboratory:  CBC with Differential:  Lab Results   Component Value Date    WBC 5.75 03/19/2025    RBC 4.19 (L) 03/19/2025    HGB 12.3 03/19/2025    HCT 37.3 03/19/2025    MCV 89.0 03/19/2025    MCH 29.4 03/19/2025    MCHC 33.0 03/19/2025    RDW 13.7 03/19/2025     03/19/2025    MPV 11.6 (H) 03/19/2025        CMP:  Sodium   Date Value Ref Range Status   02/27/2025 142 136 - 145 mmol/L Final     Potassium   Date Value Ref Range Status   02/27/2025 4.4 3.5 - 5.1 mmol/L Final     Chloride   Date Value Ref Range Status   02/27/2025 107 98 - 107 mmol/L Final     CO2   Date Value Ref Range Status   02/27/2025 30 23 - 31 mmol/L Final     Blood Urea Nitrogen   Date Value Ref Range Status   02/27/2025 12.7 9.8 - 20.1 mg/dL Final     Creatinine   Date Value Ref Range Status   02/27/2025 0.77 0.55 - 1.02  mg/dL Final     Calcium   Date Value Ref Range Status   02/27/2025 10.1 8.4 - 10.2 mg/dL Final     Albumin   Date Value Ref Range Status   02/27/2025 3.7 3.4 - 4.8 g/dL Final     Bilirubin Total   Date Value Ref Range Status   02/27/2025 0.6 <=1.5 mg/dL Final     ALP   Date Value Ref Range Status   02/27/2025 44 40 - 150 unit/L Final     AST   Date Value Ref Range Status   02/27/2025 25 5 - 34 unit/L Final     ALT   Date Value Ref Range Status   02/27/2025 24 0 - 55 unit/L Final     Estimated GFR-Non    Date Value Ref Range Status   05/20/2022 >60 mls/min/1.73/m2 Final         Assessment:       No diagnosis found.         1.) Invasive lobular carcinoma right breast Dx 8/26//2024  ---ER 89.40%, CT 82.10%, HER2 equivocal, 2+, amplified by fish, Ki 67, high proliferation, 57.30%   ---status post right lumpectomy with sentinel lymph node biopsy on 10/9/2024--Invasive Pleomorphic lobular carcinoma   ---T2 N0 M0 stage IB, G3, ER/CT/HER2 positive    Review with her and daughter again today; diagnosis, prognosis and treatment recommendations according to NCCN guidelines.  Even though she is an elderly patient she will benefit of Herceptin base regimen followed by hormonal therapy once chemotherapy completed.  Because of her age recommend taxane with Herceptin.  Patient is adamant that she will not have chemotherapy or radiation therapy (not indicated at her age, also LN neg).  She does not opposed to try Herceptin and aromatase inhibitor.    Patient with osteopenia, therefore we will start Prolia with letrozole for bone health and to decreased risk of pathological fractures and development of osteoporosis    2.) pT2pN0M0--Stage I colon cancer--diagnosed in April 2021   -3/15/2021:Colonoscopy: malignant partially obstructing tumor in the ascending colon. Biopsy tubular adenoma with at least high-grade dysplasia.    -4/22/2021: Laparoscopic/robotic right hemicolectomy    -Path: Adenocarcinoma superficially  invades muscularis propria. Eighteen mesenteric lymph nodes, no neoplasm (0/18).     NCCN guidelines (COL-3) for pathological stage T2, N0, M0   Surveillance (COL-8): colonoscopy in 1 year after surgery and then in 1 year for advanced adenoma, if no advanced adenoma then repeat in 3 yrs and then q 5 yrs.   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  These are benign lesions.    4) Liver disease--Cirrhosis of the liver with Splenomegaly and ascites. Followed by GI    5) Heart disease- s/p stent placement  --ECHO with normal EF 60-65%        Plan:       Dx:Invasive lobular carcinoma right breast Dx 8/26//2024  Status:  Undergoing active treatment  See the assessment section for details about the disease.  Patient is undergoing treatment with Herceptin and aromatase inhibitor in the form of letrozole.  Today the patient will be due for cycle 7 trastuzumab  I review laboratory evaluation today which is appropriate to proceed with treatment.  I reviewed side effects and signs and symptoms to report.  Baseline echocardiogram 10/17/24, LVEF 60-65%  Repeat echocardiogram 12/18/2024, LVEF  60-65%  Due for ECHO now ordered to be done ASAP.  We will continue to do echocardiograms every 3 month for as long as the patient is on Herceptin after completion of Herceptin we will do echocardiograms every 6-month for a total of 24 month.  Patient will return in 3 weeks treatment.  Dx:pT2pN0M0--Stage I colon cancer--diagnosed in April 2021  Status:CLAUDIO  Referred to the assessment patient for diagnosis data.  Patient is almost 4 years out since diagnosis.  We will check CT of the chest, abdomen, pelvis in September 2025.  Denies any changes in bowel habits.  Dx:Liver disease  Status:  Ongoing  Patient is followed by GI  She has cirrhosis of the liver with splenomegaly and  ascites.  Continue follow-up with GI  Dx:  Heart disease  Status:  Ongoing  Status post stent placement.  Most recent echocardiogram on December 18th 2024 shows ejection fraction = 60-65%  Continue follow-up with cardiologist  Dx:  Leukopenia  Status:  Resolved  WBC=5.75  Dx:  Thrombocytopenia  Status: Improved  Platelet count = 133 000  Likely secondary to liver disease   Patient denies bleeding or abnormal bruising  We will continue to monitor  Dx:  Osteopenia  Status:  Ongoing  Continue calcium and vitamin-D  We will continue Prolia injections every six-month due 5/14/2025            Patient instructions and visit orders.       -Follow-up:  F/U with NP- 4/9/2025  -Labs:  CBC, CMP, LDH, CA 27.29- 4/9/2025  -Imaging:  Echocardiogram- NEEDS DONE NOW, patient is due  -Other orders:  Treatment, TRASTUZUMAB -4/9/2025    43 were spent on this encounter    Jonn Noguera, MSN, FNP-BC, APRN, AOCNP   Department of Hematology/Oncology.

## 2025-03-19 NOTE — TELEPHONE ENCOUNTER
Copied from CRM #[id number]. Topic: Medications - Medication Refill  >> Mar 19, 2025  3:17 PM Audrey wrote:  Who Called: Wendieying Richardriele    Refill or New Rx:Refill  RX Name and Strength:EScitalopram oxalate (LEXAPRO) 10 MG tablet  How is the patient currently taking it? (ex. 1XDay):  Is this a 30 day or 90 day RX:90  Local or Mail Order:  List of preferred pharmacies on file (remove unneeded): [unfilled]  If different Pharmacy is requested, enter Pharmacy information here including location and phone number: [facility][id number] AT [address]       Ordering Provider:      Preferred Method of Contact: Phone Call  Patient's Preferred Phone Number on File: [phone]   Best Call Back Number, if different:  Additional Information: asked medication is filled today,pt is leaving town later today.  stated pharmacy has been requesting with no response,

## 2025-04-01 DIAGNOSIS — C50.111 MALIGNANT NEOPLASM OF CENTRAL PORTION OF RIGHT BREAST IN FEMALE, ESTROGEN RECEPTOR POSITIVE: ICD-10-CM

## 2025-04-01 DIAGNOSIS — Z98.890 S/P LUMPECTOMY OF BREAST: ICD-10-CM

## 2025-04-01 DIAGNOSIS — C18.2 MALIGNANT NEOPLASM OF ASCENDING COLON: ICD-10-CM

## 2025-04-01 DIAGNOSIS — C50.919 HER2-POSITIVE CARCINOMA OF BREAST: ICD-10-CM

## 2025-04-01 DIAGNOSIS — Z17.0 MALIGNANT NEOPLASM OF CENTRAL PORTION OF RIGHT BREAST IN FEMALE, ESTROGEN RECEPTOR POSITIVE: ICD-10-CM

## 2025-04-01 DIAGNOSIS — Z17.31 HER2-POSITIVE CARCINOMA OF BREAST: ICD-10-CM

## 2025-04-02 RX ORDER — LETROZOLE 2.5 MG/1
2.5 TABLET, FILM COATED ORAL DAILY
Qty: 90 TABLET | Refills: 0 | Status: SHIPPED | OUTPATIENT
Start: 2025-04-02

## 2025-04-09 ENCOUNTER — INFUSION (OUTPATIENT)
Dept: INFUSION THERAPY | Facility: HOSPITAL | Age: 85
End: 2025-04-09
Attending: INTERNAL MEDICINE
Payer: MEDICARE

## 2025-04-09 ENCOUNTER — OFFICE VISIT (OUTPATIENT)
Dept: HEMATOLOGY/ONCOLOGY | Facility: CLINIC | Age: 85
End: 2025-04-09
Payer: MEDICARE

## 2025-04-09 VITALS
TEMPERATURE: 98 F | SYSTOLIC BLOOD PRESSURE: 147 MMHG | DIASTOLIC BLOOD PRESSURE: 78 MMHG | HEIGHT: 64 IN | HEART RATE: 66 BPM | OXYGEN SATURATION: 99 % | WEIGHT: 164.5 LBS | BODY MASS INDEX: 28.09 KG/M2

## 2025-04-09 DIAGNOSIS — C50.919 HER2-POSITIVE CARCINOMA OF BREAST: ICD-10-CM

## 2025-04-09 DIAGNOSIS — Z17.0 MALIGNANT NEOPLASM OF CENTRAL PORTION OF RIGHT BREAST IN FEMALE, ESTROGEN RECEPTOR POSITIVE: ICD-10-CM

## 2025-04-09 DIAGNOSIS — Z17.0 MALIGNANT NEOPLASM OF CENTRAL PORTION OF RIGHT BREAST IN FEMALE, ESTROGEN RECEPTOR POSITIVE: Primary | ICD-10-CM

## 2025-04-09 DIAGNOSIS — Z98.890 S/P LUMPECTOMY OF BREAST: ICD-10-CM

## 2025-04-09 DIAGNOSIS — C50.919 HER2-POSITIVE CARCINOMA OF BREAST: Primary | ICD-10-CM

## 2025-04-09 DIAGNOSIS — C50.111 MALIGNANT NEOPLASM OF CENTRAL PORTION OF RIGHT BREAST IN FEMALE, ESTROGEN RECEPTOR POSITIVE: ICD-10-CM

## 2025-04-09 DIAGNOSIS — C18.9 MALIGNANT NEOPLASM OF COLON, UNSPECIFIED PART OF COLON: ICD-10-CM

## 2025-04-09 DIAGNOSIS — C50.111 MALIGNANT NEOPLASM OF CENTRAL PORTION OF RIGHT BREAST IN FEMALE, ESTROGEN RECEPTOR POSITIVE: Primary | ICD-10-CM

## 2025-04-09 DIAGNOSIS — Z17.31 HER2-POSITIVE CARCINOMA OF BREAST: Primary | ICD-10-CM

## 2025-04-09 DIAGNOSIS — Z17.31 HER2-POSITIVE CARCINOMA OF BREAST: ICD-10-CM

## 2025-04-09 PROCEDURE — 99214 OFFICE O/P EST MOD 30 MIN: CPT | Mod: PBBFAC,25 | Performed by: NURSE PRACTITIONER

## 2025-04-09 PROCEDURE — 25000003 PHARM REV CODE 250: Performed by: NURSE PRACTITIONER

## 2025-04-09 PROCEDURE — 99999 PR PBB SHADOW E&M-EST. PATIENT-LVL IV: CPT | Mod: PBBFAC,,, | Performed by: NURSE PRACTITIONER

## 2025-04-09 PROCEDURE — 96413 CHEMO IV INFUSION 1 HR: CPT

## 2025-04-09 PROCEDURE — 63600175 PHARM REV CODE 636 W HCPCS: Mod: TB | Performed by: NURSE PRACTITIONER

## 2025-04-09 RX ORDER — HEPARIN 100 UNIT/ML
500 SYRINGE INTRAVENOUS
Status: CANCELLED | OUTPATIENT
Start: 2025-04-09

## 2025-04-09 RX ORDER — DIPHENHYDRAMINE HYDROCHLORIDE 50 MG/ML
50 INJECTION, SOLUTION INTRAMUSCULAR; INTRAVENOUS ONCE AS NEEDED
Status: CANCELLED | OUTPATIENT
Start: 2025-04-09

## 2025-04-09 RX ORDER — EPINEPHRINE 0.3 MG/.3ML
0.3 INJECTION SUBCUTANEOUS ONCE AS NEEDED
Status: DISCONTINUED | OUTPATIENT
Start: 2025-04-09 | End: 2025-04-09 | Stop reason: HOSPADM

## 2025-04-09 RX ORDER — EPINEPHRINE 0.3 MG/.3ML
0.3 INJECTION SUBCUTANEOUS ONCE AS NEEDED
Status: CANCELLED | OUTPATIENT
Start: 2025-04-09

## 2025-04-09 RX ORDER — SODIUM CHLORIDE 0.9 % (FLUSH) 0.9 %
10 SYRINGE (ML) INJECTION
Status: DISCONTINUED | OUTPATIENT
Start: 2025-04-09 | End: 2025-04-09 | Stop reason: HOSPADM

## 2025-04-09 RX ORDER — DIPHENHYDRAMINE HYDROCHLORIDE 50 MG/ML
50 INJECTION, SOLUTION INTRAMUSCULAR; INTRAVENOUS ONCE AS NEEDED
Status: DISCONTINUED | OUTPATIENT
Start: 2025-04-09 | End: 2025-04-09 | Stop reason: HOSPADM

## 2025-04-09 RX ORDER — HEPARIN 100 UNIT/ML
500 SYRINGE INTRAVENOUS
Status: DISCONTINUED | OUTPATIENT
Start: 2025-04-09 | End: 2025-04-09 | Stop reason: HOSPADM

## 2025-04-09 RX ORDER — SODIUM CHLORIDE 0.9 % (FLUSH) 0.9 %
10 SYRINGE (ML) INJECTION
Status: CANCELLED | OUTPATIENT
Start: 2025-04-09

## 2025-04-09 RX ADMIN — TRASTUZUMAB-ANNS 456 MG: 420 INJECTION, POWDER, LYOPHILIZED, FOR SOLUTION INTRAVENOUS at 02:04

## 2025-04-09 RX ADMIN — SODIUM CHLORIDE: 9 INJECTION, SOLUTION INTRAVENOUS at 02:04

## 2025-04-09 NOTE — PLAN OF CARE
C8 Kanjinti (Q3W) given; tolerated well; pt educated to check with clinic scheduler for next appointments; discharged home in stable condition.

## 2025-04-09 NOTE — PROGRESS NOTES
HEMATOLOGY/ONCOLOGY OFFICE CLINIC VISIT    Visit Information:    Initial Evaluation: 5/26/2021  Referring Provider: Dr. Robert Conner  Other providers:  Code status:  Not addressed    Diagnosis:  1) T2N0M0-Stage I Colon cancer. 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   Letrozole started 11/2024    Treatment/Oncology history:  -03/15/2021: Colonoscopy: malignant partially obstructing tumor in the ascending colon. Biopsy tubular adenoma with at least high-grade dysplasia.   -04/22/2021: Laparoscopic/robotic right hemicolectomy   -08/15/2024: Abnormal right breast mammogram BI-RADS category 4, suspicious  -08/26/2024: Ultrasound-guided right breast biopsy at 1:00 a.m. position--> invasive lobular carcinoma  -10/09/2024: s/p right lumpectomy with sentinel lymph node biopsy       Imaging:  CT A/P 3/17/2021 at [facility]: irregular colonic mass 5 to 6 cm in length proximal to midportion of descending colon.  Enhancing soft tissue component contacts and may invade the right lateral abdominal wall.  No regional or distant lymphadenopathy identified.  Cirrhotic liver with portal hypertension, small volume ascites and splenomegaly.  No focal lesion identified in the liver.    CT of the thorax 4/5/2021: no thoracic metastatic disease identified.  Small volume ascites and borderline splenomegaly.No adenopathy or distant metastases.  CT angiogram 5/20/2021: negative for pulmonary embolism but lungs demonstrate mild heterogeneous attenuation with subtle peripheral reticular opacities.  Primary differential consideration is small airways disease.  Findings may be secondary to multifocal infiltrate from infection versus pulmonary edema.  It also showed moderate ascites with increase in size since prior examination.  Liver  with nodular contour and was enlarged.  Spleen borderline enlarged.  A 1.3 x 1 cm enhancing nodule in the left adrenal gland no significant change.  Ultrasound of the abdomen 5/23/2021:cirrhosis of the liver with hepatosplenomegaly and ascites.   Ultrasound of the abdomen 10/26/2022: The pancreas appears grossly unremarkable.  No pancreatic mass or lesion is seen. liver is slightly enlarged in size. Liver is echogenic it measures 16 cm by my measurements..  No liver mass or lesion is seen. spleen appears enlarged and measures 14.5 cm.  Hepatomegaly with findings consistent with hepatic steatosis  CT C/A/P 2/16/2023:     1. No evidence of metastatic disease within the chest, abdomen and pelvis  2. Cirrhotic morphology of the liver  3. Changes of portal hypertension including borderline splenomegaly and portosystemic collateral  4. Mild interstitial scarring within the lungs.  CT C/A/P  8/28/2023:  No new suspicious findings chest, abdomen or pelvis.  Chronic findings above.  CT C/A/P 2/21/2024: No detrimental change identified since 08/28/2023.   CT 8/22/2024: Similar mild chronic changes in the lungs with no suspicious pulmonary nodule.  Moderate coronary artery calcification.  No pleural or pericardial effusion.  No pathologically enlarged thoracic lymph node. Cirrhosis with mild hepatic steatosis.  Similar mild splenomegaly.  Stable pancreas, adrenal glands and kidneys.  Very small hiatal hernia.  Normal caliber small bowel and colon with right mid abdominal ileal colonic anastomosis.  Distal colonic diverticulosis.  Normal bladder.  No ascites or abdominal/pelvic lymphadenopathy.  Aortoiliac atherosclerosis. No aggressive osseous lesion.No metastatic disease identified.     ECHO:  10/17/24, LVEF 60-65%  02/18/24, [id number]%  4/1/2025, [id number]%    Bone Density:  8/9/24 @ [facility]: Osteopenia      Pathology:  4/22/2022:   RIGHT COLON, HEMICOLECTOMY: ADENOCARCINOMA, WELL DIFFERENTIATED, 90% MUCINOUS.   --  Greatest  tumor dimension, 7.0 cm (as measured grossly).  --  Adenocarcinoma arises from tubular adenoma with high grade dysplasia.  --  Lymphovascular invasion, not identified.  --  Adenocarcinoma superficially invades muscularis propria.  --  Eighteen mesenteric lymph nodes, no neoplasm (0/18).  --  Surgical margins, uninvolved.  POSTOPERATIVE SPINDLE CELL PSEUDOTUMOR, 1.5 CM.  -  The pseudotumor extends into mesenteric adipose tissue.     9/24/2021:  LIVER, CORE NEEDLE BIOPSY: FOCAL PORTAL TRIADITIS WITH SINUS DILATION.   - NO INTERFACE ACTIVITY.   - BRIDING FIBROSIS (FIBROSIS STAGE  F2-3)  - NO STEATOSIS.  Comment: Despite hepatitis testing demonstrating reactivity for hepatitis A in July 2021, no evidence of acute hepatitis is identified and LFTs are currently within normal limits. This case has also been reviewed in intradepartmental consultation.    8/26/2024:  RIGHT BREAST, 1 O'CLOCK MASS ULTRASOUND GUIDED VACUUM BIOPSY:  - Invasive carcinoma with lobular features, Xavier Grade 3, see comment.  - Carcinoma involving 3 of 4 cores, 7 mm in longest linear extent.  ER 89.40%, [place] 82.10%, HER2 equivocal, 2+, amplified by fish, Ki 67, high proliferation, 57.30%    10/9/2024:  1. BREAST, RIGHT, LUMPECTOMY:   PLEOMORPHIC LOBULAR CARCINOMA   DUCTAL CARCINOMA IN SITU WITH LOBULAR EXTENSION.   2. BREAST, SUPERIOR MARGIN, REEXCISION: NEGATIVE FOR INVASIVE AND IN SITU CARCINOMA.   3. AXILLA, ADDITIONAL AXILLA CONTENTS, EXCISION: TWO LYMPH NODES, NEGATIVE FOR METASTATIC CARCINOMA.   4. LYMPH NODE, RIGHT SENTINEL #1, EXCISION: ONE LYMPH NODE, NEGATIVE FOR METASTATIC CARCINOMA.   5. LYMPH NODE, RIGHT SENTINEL #2, EXCISION: ONE LYMPH NODE, NEGATIVE FOR METASTATIC CARCINOMA.   6. LYMPH NODES, RIGHT SENTINEL #3, EXCISION: TWO LYMPH NODES, NEGATIVE FOR METASTATIC CARCINOMA.     TUMOR Histologic Type:  Invasive carcinoma with features of : Pleomorphic lobular   Histologic Grade (Xavier Histologic Score):  Score 3   Tumor Size: 21  mm   Tumor Focality:  Single focus of invasive carcinoma   Ductal Carcinoma In Situ (DCIS):  Present   Lymphovascular Invasion:  Cannot be determined: Suspicious for LVI   MARGINS:  All margins negative for invasive carcinoma   REGIONAL LYMPH NODES   Total Number of Lymph Nodes Examined (sentinel and non-sentinel):  Exact number : 5   Number of [place] Nodes Examined:  Exact number : 3      PATHOLOGIC STAGE CLASSIFICATION (pTNM, AJCC 8th Edition)   pT2pN0           CLINICAL HISTORY:       Patient: 85 year old female with multiple medical problems kindly referred for colon cancer.  She initially presented with anemia and fecal occult blood positive.  She also reports 20 pounds weight loss over the last previous 6 months. Colonoscopy on 3/15/2021 revealed a malignant partially obstructing tumor in the ascending colon. Biopsy showed tubular adenoma with at least high-grade dysplasia.  On 3/17/2021 she underwent a CT scan of the abdomen and pelvis at [facility] that showed an irregular colonic mass measuring about 5 to 6 cm in length involving proximal to midportion of descending colon.  Enhancing soft tissue component contacts and may invade the right lateral abdominal wall.  No regional or distant lymphadenopathy identified.  Cirrhotic liver with portal hypertension, small volume ascites and splenomegaly.  No focal lesion identified in the liver.  Staging CT of the thorax 4/5/2021 with no thoracic metastatic disease identified.  Small volume ascites and borderline splenomegaly.No adenopathy or distant metastases.   Patient was seen by Dr. Robert Conner and she underwent laparoscopic/robotic right hemicolectomy on 4/22/2021.  Pathology report as follows:   RIGHT COLON, HEMICOLECTOMY: ADENOCARCINOMA, WELL DIFFERENTIATED, 90% MUCINOUS. 0/18 LN positive for malignanacy.See above for details.  Patient recovered from the surgery but came back to the emergency department for chest pain. She underwent CT angiogram that  was negative for pulmonary embolism but lungs demonstrate mild heterogeneous attenuation with subtle peripheral reticular opacities.  Primary differential consideration is small airways disease.  Findings may be secondary to multifocal infiltrate from infection versus pulmonary edema.  It also showed moderate ascites with increase in size since prior examination.  Liver with nodular contour and was enlarged.  Spleen borderline enlarged.  1.3 x 1 cm enhancing nodule in the left adrenal gland no significant change.  She underwent ultrasound of the abdomen that confirmed the diagnosis of cirrhosis of the liver with hepatosplenomegaly and ascites.  She was discharged on 5/20/2021.  She stopped drinking at age 38, she says that she is a social drinker. She denies any family history of colon cancer. She denies any fever, chills, or sweats. No chest pain or shortness of breath.  She had liver biopsy ordered by Dr. Louis on 9/24/21. It was ordered for cirrhosis. She has no history of hepatitis infection or alcohol abuse. Biopsy was negative for malignancy. Just  fibrosis.              Chief Complaint: 3 week follow up  (Malignant neoplasm of colon, unspecified part of colon)      Interval History:  Patient presents today for follow up. She underwent right breast lumpectomy with biopsy on 10/9/24 with Dr. Conner.   Herceptin and letrozole and so far tolerating treatment well.     Interval history  4/9/2025:  On today's visit the patient presents to the office for follow-up and monitoring of her breast cancer on active treatment with Herceptin and letrozole.  The patient denies headache, diarrhea, nausea, and chills.  She also denies any bloating the stool changes in bowel habits or GI distress. Recently returned from a fishing trip which was daniel to her.        ROS: All 14 points ROS taken and as per Interval History  Review of Systems   Constitutional:  Negative for chills, fever and weight loss.   HENT:  Negative for  "congestion and nosebleeds.    Eyes:  Negative for blurred vision, double vision and photophobia.   Respiratory:  Negative for cough, hemoptysis and shortness of breath.    Cardiovascular:  Negative for chest pain, palpitations, leg swelling and PND.   Gastrointestinal:  Negative for abdominal pain, blood in stool, constipation, diarrhea, melena, nausea and vomiting.   Genitourinary:  Negative for dysuria, frequency, hematuria and urgency.   Musculoskeletal:  Negative for back pain, falls and myalgias.   Skin:  Negative for itching and rash.   Neurological:  Negative for tremors, focal weakness, seizures, weakness and headaches.   Endo/Heme/Allergies:  Negative for environmental allergies. Does not bruise/bleed easily.   Psychiatric/Behavioral:  Negative for depression and suicidal ideas. The patient is not nervous/anxious.      Histories:  PMH/PSH/FH/SOCIAL/ALLERGIES AND MEDS REVIEWED AND UPDATED AS APPROPRIATE       Vitals:    04/09/25 1321   BP: (!) 147/78   BP Location: Right arm   Patient Position: Sitting   Pulse: 66   Temp: 97.6 °F (36.4 °C)   TempSrc: Oral   SpO2: 99%   Weight: 74.6 kg (164 lb 8 oz)   Height: 5' 4" (1.626 m)         Wt Readings from Last 6 Encounters:   04/09/25 74.6 kg (164 lb 8 oz)   03/19/25 75.6 kg (166 lb 10.7 oz)   03/19/25 75.6 kg (166 lb 11.2 oz)   02/27/25 76.2 kg (168 lb 1.6 oz)   02/06/25 76 kg (167 lb 8.1 oz)   02/06/25 76 kg (167 lb 8 oz)     Body mass index is 28.24 kg/m².  Body surface area is 1.84 meters squared.       Physical Exam  Constitutional:       Appearance: Normal appearance.   HENT:      Head: Normocephalic and atraumatic.   Eyes:      General: No scleral icterus.     Extraocular Movements: Extraocular movements intact.      Conjunctiva/sclera: Conjunctivae normal.   Neck:      Vascular: No JVD.   Cardiovascular:      Rate and Rhythm: Normal rate and regular rhythm.      Heart sounds: No murmur heard.  Pulmonary:      Effort: Pulmonary effort is normal.      Breath " sounds: Normal breath sounds. No wheezing or rhonchi.   Chest:   Breasts:     Right: Normal. No swelling, mass, nipple discharge, skin change or tenderness.      Left: No swelling, mass, nipple discharge, skin change or tenderness.      Comments: Surgical sites well healed  Abdominal:      General: Bowel sounds are normal. There is no distension.      Palpations: Abdomen is soft. There is no mass.      Tenderness: There is no abdominal tenderness.   Musculoskeletal:      Cervical back: Neck supple.   Lymphadenopathy:      Head:      Right side of head: No submental or submandibular adenopathy.      Left side of head: No submental or submandibular adenopathy.      Cervical: No cervical adenopathy.      Upper Body:      Right upper body: No supraclavicular or axillary adenopathy.      Left upper body: No supraclavicular or axillary adenopathy.      Lower Body: No right inguinal adenopathy. No left inguinal adenopathy.   Skin:     General: Skin is warm.      Coloration: Skin is not jaundiced.      Findings: No lesion or rash.      Nails: There is no clubbing.   Neurological:      Mental Status: She is alert and oriented to person, place, and time.      Cranial Nerves: Cranial nerves 2-12 are intact.   Psychiatric:         Attention and Perception: Attention normal.         Behavior: Behavior is cooperative.         Judgment: Judgment normal.     ECOG SCORE             Laboratory:  CBC with Differential:  Lab Results   Component Value Date    WBC 5.81 04/09/2025    RBC 4.14 (L) 04/09/2025    HGB 12.2 04/09/2025    HCT 36.9 (L) 04/09/2025    MCV 89.1 04/09/2025    MCH 29.5 04/09/2025    MCHC 33.1 04/09/2025    RDW 13.6 04/09/2025     04/09/2025    MPV 10.8 (H) 04/09/2025        CMP:  Sodium   Date Value Ref Range Status   03/19/2025 141 136 - 145 mmol/L Final     Potassium   Date Value Ref Range Status   03/19/2025 4.7 3.5 - 5.1 mmol/L Final     Chloride   Date Value Ref Range Status   03/19/2025 106 98 - 107  mmol/L Final     CO2   Date Value Ref Range Status   03/19/2025 27 23 - 31 mmol/L Final     Blood Urea Nitrogen   Date Value Ref Range Status   03/19/2025 14.0 9.8 - 20.1 mg/dL Final     Creatinine   Date Value Ref Range Status   03/19/2025 0.74 0.55 - 1.02 mg/dL Final     Calcium   Date Value Ref Range Status   03/19/2025 10.3 (H) 8.4 - 10.2 mg/dL Final     Albumin   Date Value Ref Range Status   03/19/2025 4.0 3.4 - 4.8 g/dL Final     Bilirubin Total   Date Value Ref Range Status   03/19/2025 0.6 <=1.5 mg/dL Final     ALP   Date Value Ref Range Status   03/19/2025 46 40 - 150 unit/L Final     AST   Date Value Ref Range Status   03/19/2025 31 5 - 34 unit/L Final     ALT   Date Value Ref Range Status   03/19/2025 26 0 - 55 unit/L Final     Estimated GFR-Non    Date Value Ref Range Status   05/20/2022 >60 mls/min/1.73/m2 Final         Assessment:       1. Malignant neoplasm of central portion of right breast in female, estrogen receptor positive    2. S/P lumpectomy of breast    3. HER2-positive carcinoma of breast    4. Malignant neoplasm of colon, unspecified part of colon             1.) Invasive lobular carcinoma right breast Dx 8/26//2024  ---ER 89.40%, [place] 82.10%, HER2 equivocal, 2+, amplified by fish, Ki 67, high proliferation, 57.30%   ---status post right lumpectomy with sentinel lymph node biopsy on 10/9/2024--Invasive Pleomorphic lobular carcinoma   ---T2 N0 M0 stage IB, G3, ER/[place]/HER2 positive    Review with her and daughter again today; diagnosis, prognosis and treatment recommendations according to NCCN guidelines.  Even though she is an elderly patient she will benefit of Herceptin base regimen followed by hormonal therapy once chemotherapy completed.  Because of her age recommend taxane with Herceptin.  Patient is adamant that she will not have chemotherapy or radiation therapy (not indicated at her age, also LN neg).  She does not opposed to try Herceptin and aromatase  inhibitor.    Patient with osteopenia, therefore we will start Prolia with letrozole for bone health and to decreased risk of pathological fractures and development of osteoporosis    2.) pT2pN0M0--Stage I colon cancer--diagnosed in April 2021   -3/15/2021:Colonoscopy: malignant partially obstructing tumor in the ascending colon. Biopsy tubular adenoma with at least high-grade dysplasia.    -4/22/2021: Laparoscopic/robotic right hemicolectomy    -Path: Adenocarcinoma superficially invades muscularis propria. Eighteen mesenteric lymph nodes, no neoplasm (0/18).     NCCN guidelines (COL-3) for pathological stage T2, N0, M0   Surveillance (COL-8): colonoscopy in 1 year after surgery and then in 1 year for advanced adenoma, if no advanced adenoma then repeat in 3 yrs and then q 5 yrs.   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  These are benign lesions.    4) Liver disease--Cirrhosis of the liver with Splenomegaly and ascites. Followed by GI    5) Heart disease- s/p stent placement  --ECHO with normal EF 60-65%        Plan:       Dx:Invasive lobular carcinoma right breast Dx 8/26//2024  Status:  Undergoing active treatment  See the assessment section for details about the disease.  Patient is undergoing treatment with Herceptin and aromatase inhibitor in the form of letrozole.  Today the patient will be due for cycle 8 trastuzumab  I review laboratory evaluation today which is appropriate to proceed with treatment.  I reviewed side effects and signs and symptoms to report.  Baseline echocardiogram 10/17/24, LVEF 60-65%  Repeat echocardiogram 4/1//2025, LVEF  60-65%  We will continue to do echocardiograms every 3 month for as long as the patient is on Herceptin after completion of Herceptin we will do echocardiograms every 6-month for a total of 24  month.  Patient will return in 3 weeks treatment.  Dx:pT2pN0M0--Stage I colon cancer--diagnosed in April 2021  Status:CLAUDIO  Referred to the assessment patient for diagnosis data.  Patient is almost 4 years out since diagnosis.  We will check CT of the chest, abdomen, pelvis in September 2025.  Denies any changes in bowel habits.  Dx:Liver disease  Status:  Ongoing  Patient is followed by GI  She has cirrhosis of the liver with splenomegaly and ascites.  Continue follow-up with GI  Dx:  Heart disease  Status:  Ongoing  Status post stent placement.  Most recent echocardiogram on 4/1/2025 shows ejection fraction = 60-65%  Continue follow-up with cardiologist  Dx:  Osteopenia  Status:  Ongoing  Continue calcium and vitamin-D  We will continue Prolia injections every six-month due 5/14/2025            Patient instructions and visit orders.       -Follow-up:  F/U with NP- 4/30/2025  -Labs:  CBC, CMP, LDH, CA 27.29- 4/30/2025  -Other orders:  Treatment, TRASTUZUMAB -4/30/2025    40 were spent on this encounter    Jonn Noguera, MSN, FNP-BC, APRN, AOCNP   Department of Hematology/Oncology.

## 2025-04-30 ENCOUNTER — OFFICE VISIT (OUTPATIENT)
Dept: HEMATOLOGY/ONCOLOGY | Facility: CLINIC | Age: 85
End: 2025-04-30
Payer: MEDICARE

## 2025-04-30 ENCOUNTER — INFUSION (OUTPATIENT)
Dept: INFUSION THERAPY | Facility: HOSPITAL | Age: 85
End: 2025-04-30
Attending: INTERNAL MEDICINE
Payer: MEDICARE

## 2025-04-30 VITALS
OXYGEN SATURATION: 98 % | BODY MASS INDEX: 28.73 KG/M2 | TEMPERATURE: 98 F | RESPIRATION RATE: 18 BRPM | HEIGHT: 64 IN | DIASTOLIC BLOOD PRESSURE: 73 MMHG | WEIGHT: 168.31 LBS | HEART RATE: 56 BPM | SYSTOLIC BLOOD PRESSURE: 145 MMHG

## 2025-04-30 DIAGNOSIS — C50.111 MALIGNANT NEOPLASM OF CENTRAL PORTION OF RIGHT BREAST IN FEMALE, ESTROGEN RECEPTOR POSITIVE: ICD-10-CM

## 2025-04-30 DIAGNOSIS — C50.111 MALIGNANT NEOPLASM OF CENTRAL PORTION OF RIGHT BREAST IN FEMALE, ESTROGEN RECEPTOR POSITIVE: Primary | ICD-10-CM

## 2025-04-30 DIAGNOSIS — Z17.0 MALIGNANT NEOPLASM OF CENTRAL PORTION OF RIGHT BREAST IN FEMALE, ESTROGEN RECEPTOR POSITIVE: Primary | ICD-10-CM

## 2025-04-30 DIAGNOSIS — C50.919 HER2-POSITIVE CARCINOMA OF BREAST: Primary | ICD-10-CM

## 2025-04-30 DIAGNOSIS — Z17.31 HER2-POSITIVE CARCINOMA OF BREAST: Primary | ICD-10-CM

## 2025-04-30 DIAGNOSIS — Z17.0 MALIGNANT NEOPLASM OF CENTRAL PORTION OF RIGHT BREAST IN FEMALE, ESTROGEN RECEPTOR POSITIVE: ICD-10-CM

## 2025-04-30 DIAGNOSIS — C18.9 MALIGNANT NEOPLASM OF COLON, UNSPECIFIED PART OF COLON: ICD-10-CM

## 2025-04-30 PROCEDURE — 99999 PR PBB SHADOW E&M-EST. PATIENT-LVL V: CPT | Mod: PBBFAC,,, | Performed by: NURSE PRACTITIONER

## 2025-04-30 PROCEDURE — 99215 OFFICE O/P EST HI 40 MIN: CPT | Mod: PBBFAC | Performed by: NURSE PRACTITIONER

## 2025-04-30 PROCEDURE — 25000003 PHARM REV CODE 250: Performed by: NURSE PRACTITIONER

## 2025-04-30 PROCEDURE — 96413 CHEMO IV INFUSION 1 HR: CPT

## 2025-04-30 PROCEDURE — 63600175 PHARM REV CODE 636 W HCPCS: Mod: TB | Performed by: NURSE PRACTITIONER

## 2025-04-30 RX ORDER — HEPARIN 100 UNIT/ML
500 SYRINGE INTRAVENOUS
Status: CANCELLED | OUTPATIENT
Start: 2025-04-30

## 2025-04-30 RX ORDER — DIPHENHYDRAMINE HYDROCHLORIDE 50 MG/ML
50 INJECTION, SOLUTION INTRAMUSCULAR; INTRAVENOUS ONCE AS NEEDED
Status: DISCONTINUED | OUTPATIENT
Start: 2025-04-30 | End: 2025-04-30 | Stop reason: HOSPADM

## 2025-04-30 RX ORDER — DIPHENHYDRAMINE HYDROCHLORIDE 50 MG/ML
50 INJECTION, SOLUTION INTRAMUSCULAR; INTRAVENOUS ONCE AS NEEDED
Status: CANCELLED | OUTPATIENT
Start: 2025-04-30

## 2025-04-30 RX ORDER — SODIUM CHLORIDE 0.9 % (FLUSH) 0.9 %
10 SYRINGE (ML) INJECTION
Status: DISCONTINUED | OUTPATIENT
Start: 2025-04-30 | End: 2025-04-30 | Stop reason: HOSPADM

## 2025-04-30 RX ORDER — EPINEPHRINE 0.3 MG/.3ML
0.3 INJECTION SUBCUTANEOUS ONCE AS NEEDED
Status: CANCELLED | OUTPATIENT
Start: 2025-04-30

## 2025-04-30 RX ORDER — SODIUM CHLORIDE 0.9 % (FLUSH) 0.9 %
10 SYRINGE (ML) INJECTION
Status: CANCELLED | OUTPATIENT
Start: 2025-04-30

## 2025-04-30 RX ORDER — HEPARIN 100 UNIT/ML
500 SYRINGE INTRAVENOUS
Status: DISCONTINUED | OUTPATIENT
Start: 2025-04-30 | End: 2025-04-30 | Stop reason: HOSPADM

## 2025-04-30 RX ORDER — EPINEPHRINE 0.3 MG/.3ML
0.3 INJECTION SUBCUTANEOUS ONCE AS NEEDED
Status: DISCONTINUED | OUTPATIENT
Start: 2025-04-30 | End: 2025-04-30 | Stop reason: HOSPADM

## 2025-04-30 RX ADMIN — TRASTUZUMAB-ANNS 456 MG: 420 INJECTION, POWDER, LYOPHILIZED, FOR SOLUTION INTRAVENOUS at 03:04

## 2025-05-05 DIAGNOSIS — E03.9 HYPOTHYROIDISM, UNSPECIFIED TYPE: ICD-10-CM

## 2025-05-05 RX ORDER — LEVOTHYROXINE SODIUM 75 UG/1
75 TABLET ORAL DAILY
Qty: 90 TABLET | Refills: 1 | Status: SHIPPED | OUTPATIENT
Start: 2025-05-05

## 2025-05-05 NOTE — TELEPHONE ENCOUNTER
Copied from CRM #[id number]. Topic: Medications - Medication Refill  >> May 5, 2025  2:53 PM Isaura wrote:  Who Called: erika Polk daughter    Refill or New Rx:Refill    RX Name and Strength:levothyroxine (SYNTHROID) 75 MCG tablet    How is the patient currently taking it? (ex. 1XDay):n/a    Is this a 30 day or 90 day RX:n/a    Local or Mail Order: [facility]    List of preferred pharmacies on file (remove unneeded): [facility] #[id number] at [address]   Phone: [phone]  Fax: [phone]    Ordering Provider:[facility]    Preferred Method of Contact: Phone Call    Patient's Preferred Phone Number on File: [phone]     Best Call Back Number, if different: N/A    Additional Information: please advise when sent to pharmacy

## 2025-05-16 DIAGNOSIS — C50.111 MALIGNANT NEOPLASM OF CENTRAL PORTION OF RIGHT BREAST IN FEMALE, ESTROGEN RECEPTOR POSITIVE: Primary | ICD-10-CM

## 2025-05-16 DIAGNOSIS — C18.9 MALIGNANT NEOPLASM OF COLON, UNSPECIFIED PART OF COLON: ICD-10-CM

## 2025-05-16 DIAGNOSIS — Z17.0 MALIGNANT NEOPLASM OF CENTRAL PORTION OF RIGHT BREAST IN FEMALE, ESTROGEN RECEPTOR POSITIVE: Primary | ICD-10-CM

## 2025-05-16 NOTE — PROGRESS NOTES
HEMATOLOGY/ONCOLOGY OFFICE CLINIC VISIT    Visit Information:    Initial Evaluation: 5/26/2021  Referring Provider: Dr. Robert Conner  Other providers:  Code status:  Not addressed    Diagnosis:  1) T2N0M0-Stage I Colon cancer. 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   Letrozole started 11/2024    Treatment/Oncology history:  -03/15/2021: Colonoscopy: malignant partially obstructing tumor in the ascending colon. Biopsy tubular adenoma with at least high-grade dysplasia.   -04/22/2021: Laparoscopic/robotic right hemicolectomy   -08/15/2024: Abnormal right breast mammogram BI-RADS category 4, suspicious  -08/26/2024: Ultrasound-guided right breast biopsy at 1:00 a.m. position--> invasive lobular carcinoma  -10/09/2024: s/p right lumpectomy with sentinel lymph node biopsy  -postlumpectomy radiation not indicated  - Herceptin started on 11/14/24 + Letrozole started 11/2024      Imaging:  CT A/P 3/17/2021 at [facility]: irregular colonic mass 5 to 6 cm in length proximal to midportion of descending colon.  Enhancing soft tissue component contacts and may invade the right lateral abdominal wall.  No regional or distant lymphadenopathy identified.  Cirrhotic liver with portal hypertension, small volume ascites and splenomegaly.  No focal lesion identified in the liver.    CT of the thorax 4/5/2021: no thoracic metastatic disease identified.  Small volume ascites and borderline splenomegaly.No adenopathy or distant metastases.  CT angiogram 5/20/2021: negative for pulmonary embolism but lungs demonstrate mild heterogeneous attenuation with subtle peripheral reticular opacities.  Primary differential consideration is small airways disease.  Findings may be secondary to multifocal infiltrate from infection versus  pulmonary edema.  It also showed moderate ascites with increase in size since prior examination.  Liver with nodular contour and was enlarged.  Spleen borderline enlarged.  A 1.3 x 1 cm enhancing nodule in the left adrenal gland no significant change.  Ultrasound of the abdomen 5/23/2021:cirrhosis of the liver with hepatosplenomegaly and ascites.   Ultrasound of the abdomen 10/26/2022: The pancreas appears grossly unremarkable.  No pancreatic mass or lesion is seen. liver is slightly enlarged in size. Liver is echogenic it measures 16 cm by my measurements..  No liver mass or lesion is seen. spleen appears enlarged and measures 14.5 cm.  Hepatomegaly with findings consistent with hepatic steatosis  CT C/A/P 2/16/2023:     1. No evidence of metastatic disease within the chest, abdomen and pelvis  2. Cirrhotic morphology of the liver  3. Changes of portal hypertension including borderline splenomegaly and portosystemic collateral  4. Mild interstitial scarring within the lungs.  CT C/A/P  8/28/2023:  No new suspicious findings chest, abdomen or pelvis.  Chronic findings above.  CT C/A/P 2/21/2024: No detrimental change identified since 08/28/2023.   CT 8/22/2024: Similar mild chronic changes in the lungs with no suspicious pulmonary nodule.  Moderate coronary artery calcification.  No pleural or pericardial effusion.  No pathologically enlarged thoracic lymph node. Cirrhosis with mild hepatic steatosis.  Similar mild splenomegaly.  Stable pancreas, adrenal glands and kidneys.  Very small hiatal hernia.  Normal caliber small bowel and colon with right mid abdominal ileal colonic anastomosis.  Distal colonic diverticulosis.  Normal bladder.  No ascites or abdominal/pelvic lymphadenopathy.  Aortoiliac atherosclerosis. No aggressive osseous lesion.No metastatic disease identified.     ECHO:  10/17/24, LVEF 60-65%  02/18/24, PZLS95-61%  4/1/2025, RGVA87-50%    Bone Density:  8/9/24 @ [facility]:  Osteopenia      Pathology:  4/22/2022:   RIGHT COLON, HEMICOLECTOMY: ADENOCARCINOMA, WELL DIFFERENTIATED, 90% MUCINOUS.   --  Greatest tumor dimension, 7.0 cm (as measured grossly).  --  Adenocarcinoma arises from tubular adenoma with high grade dysplasia.  --  Lymphovascular invasion, not identified.  --  Adenocarcinoma superficially invades muscularis propria.  --  Eighteen mesenteric lymph nodes, no neoplasm (0/18).  --  Surgical margins, uninvolved.  POSTOPERATIVE SPINDLE CELL PSEUDOTUMOR, 1.5 CM.  -  The pseudotumor extends into mesenteric adipose tissue.     9/24/2021:  LIVER, CORE NEEDLE BIOPSY: FOCAL PORTAL TRIADITIS WITH SINUS DILATION.   - NO INTERFACE ACTIVITY.   - BRIDING FIBROSIS (FIBROSIS STAGE  F2-3)  - NO STEATOSIS.  Comment: Despite hepatitis testing demonstrating reactivity for hepatitis A in July 2021, no evidence of acute hepatitis is identified and LFTs are currently within normal limits. This case has also been reviewed in intradepartmental consultation.    8/26/2024:  RIGHT BREAST, 1 O'CLOCK MASS ULTRASOUND GUIDED VACUUM BIOPSY:  - Invasive carcinoma with lobular features, Xavier Grade 3, see comment.  - Carcinoma involving 3 of 4 cores, 7 mm in longest linear extent.  ER 89.40%, [place] 82.10%, HER2 equivocal, 2+, amplified by fish, Ki 67, high proliferation, 57.30%    10/9/2024:  1. BREAST, RIGHT, LUMPECTOMY:   PLEOMORPHIC LOBULAR CARCINOMA   DUCTAL CARCINOMA IN SITU WITH LOBULAR EXTENSION.   2. BREAST, SUPERIOR MARGIN, REEXCISION: NEGATIVE FOR INVASIVE AND IN SITU CARCINOMA.   3. AXILLA, ADDITIONAL AXILLA CONTENTS, EXCISION: TWO LYMPH NODES, NEGATIVE FOR METASTATIC CARCINOMA.   4. LYMPH NODE, RIGHT SENTINEL #1, EXCISION: ONE LYMPH NODE, NEGATIVE FOR METASTATIC CARCINOMA.   5. LYMPH NODE, RIGHT SENTINEL #2, EXCISION: ONE LYMPH NODE, NEGATIVE FOR METASTATIC CARCINOMA.   6. LYMPH NODES, RIGHT SENTINEL #3, EXCISION: TWO LYMPH NODES, NEGATIVE FOR METASTATIC CARCINOMA.     TUMOR Histologic Type:   Invasive carcinoma with features of : Pleomorphic lobular   Histologic Grade (Loraine Histologic Score):  Score 3   Tumor Size: 21 mm   Tumor Focality:  Single focus of invasive carcinoma   Ductal Carcinoma In Situ (DCIS):  Present   Lymphovascular Invasion:  Cannot be determined: Suspicious for LVI   MARGINS:  All margins negative for invasive carcinoma   REGIONAL LYMPH NODES   Total Number of Lymph Nodes Examined (sentinel and non-sentinel):  Exact number : 5   Number of [place] Nodes Examined:  Exact number : 3      PATHOLOGIC STAGE CLASSIFICATION (pTNM, AJCC 8th Edition)   pT2pN0           CLINICAL HISTORY:       Patient: 85 year old female with multiple medical problems kindly referred for colon cancer.  She initially presented with anemia and fecal occult blood positive.  She also reports 20 pounds weight loss over the last previous 6 months. Colonoscopy on 3/15/2021 revealed a malignant partially obstructing tumor in the ascending colon. Biopsy showed tubular adenoma with at least high-grade dysplasia.  On 3/17/2021 she underwent a CT scan of the abdomen and pelvis at [facility] that showed an irregular colonic mass measuring about 5 to 6 cm in length involving proximal to midportion of descending colon.  Enhancing soft tissue component contacts and may invade the right lateral abdominal wall.  No regional or distant lymphadenopathy identified.  Cirrhotic liver with portal hypertension, small volume ascites and splenomegaly.  No focal lesion identified in the liver.  Staging CT of the thorax 4/5/2021 with no thoracic metastatic disease identified.  Small volume ascites and borderline splenomegaly.No adenopathy or distant metastases.   Patient was seen by Dr. Robert Conner and she underwent laparoscopic/robotic right hemicolectomy on 4/22/2021.  Pathology report as follows:   RIGHT COLON, HEMICOLECTOMY: ADENOCARCINOMA, WELL DIFFERENTIATED, 90% MUCINOUS. 0/18 LN positive for malignanacy.See above for  details.  Patient recovered from the surgery but came back to the emergency department for chest pain. She underwent CT angiogram that was negative for pulmonary embolism but lungs demonstrate mild heterogeneous attenuation with subtle peripheral reticular opacities.  Primary differential consideration is small airways disease.  Findings may be secondary to multifocal infiltrate from infection versus pulmonary edema.  It also showed moderate ascites with increase in size since prior examination.  Liver with nodular contour and was enlarged.  Spleen borderline enlarged.  1.3 x 1 cm enhancing nodule in the left adrenal gland no significant change.  She underwent ultrasound of the abdomen that confirmed the diagnosis of cirrhosis of the liver with hepatosplenomegaly and ascites.  She was discharged on 5/20/2021.  She stopped drinking at age 38, she says that she is a social drinker. She denies any family history of colon cancer. She denies any fever, chills, or sweats. No chest pain or shortness of breath.  She had liver biopsy ordered by Dr. Louis on 9/24/21. It was ordered for cirrhosis. She has no history of hepatitis infection or alcohol abuse. Biopsy was negative for malignancy. Just  fibrosis.         Screening mammogram was abnormal.  On 08/15/2024 diagnostic right breast mammogram and ultrasound showed a 1 cm hypoechoic mass with irregular margins and shadowing in the right breast at 1 o'clock position and suspicious for malignancy.  He has was read BI-RADS category 4.    On 10/09/2024 she underwent right breast lumpectomy.  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 Conner.   Herceptin and letrozole and so far tolerating treatment  "well.       05/22/25: Patient presents today for 3 week follow up of her breast cancer on active treatment with Herceptin and letrozole. She is due for Kanjinti C10, okay to give. She is doing well and has no new complaints today. She has persistent diarrhea that is chronic and not associated with treatment. This is not worsening.    ROS: All 14 points ROS taken and as per Interval History  Review of Systems   Constitutional:  Negative for chills, fever and weight loss.   HENT:  Negative for congestion and nosebleeds.    Eyes:  Negative for blurred vision, double vision and photophobia.   Respiratory:  Negative for cough, hemoptysis and shortness of breath.    Cardiovascular:  Negative for chest pain, palpitations, leg swelling and PND.   Gastrointestinal:  Positive for diarrhea. Negative for abdominal pain, blood in stool, constipation, melena, nausea and vomiting.   Genitourinary:  Positive for frequency. Negative for dysuria, hematuria and urgency.   Musculoskeletal:  Negative for back pain, falls and myalgias.   Skin:  Negative for itching and rash.   Neurological:  Negative for tremors, focal weakness, seizures, weakness and headaches.   Endo/Heme/Allergies:  Negative for environmental allergies. Does not bruise/bleed easily.   Psychiatric/Behavioral:  Negative for depression and suicidal ideas. The patient is not nervous/anxious.      Histories:  PMH/PSH/FH/SOCIAL/ALLERGIES AND MEDS REVIEWED AND UPDATED AS APPROPRIATE       Vitals:    05/22/25 1251   BP: (!) 145/83   BP Location: Left arm   Patient Position: Sitting   Pulse: 66   Resp: 18   Temp: 97.7 °F (36.5 °C)   TempSrc: Oral   SpO2: 98%   Weight: 74.8 kg (165 lb)   Height: 5' 4" (1.626 m)           Wt Readings from Last 6 Encounters:   05/22/25 74.8 kg (165 lb)   04/30/25 76.3 kg (168 lb 4.8 oz)   04/09/25 74.6 kg (164 lb 8 oz)   03/19/25 75.6 kg (166 lb 10.7 oz)   03/19/25 75.6 kg (166 lb 11.2 oz)   02/27/25 76.2 kg (168 lb 1.6 oz)     Body mass index is " 28.32 kg/m².  Body surface area is 1.84 meters squared.       Physical Exam  Constitutional:       Appearance: Normal appearance.   HENT:      Head: Normocephalic and atraumatic.   Eyes:      General: No scleral icterus.     Extraocular Movements: Extraocular movements intact.      Conjunctiva/sclera: Conjunctivae normal.   Neck:      Vascular: No JVD.   Cardiovascular:      Rate and Rhythm: Normal rate and regular rhythm.      Heart sounds: No murmur heard.  Pulmonary:      Effort: Pulmonary effort is normal.      Breath sounds: Normal breath sounds. No wheezing or rhonchi.   Chest:   Breasts:     Right: Normal. No swelling, mass, nipple discharge, skin change or tenderness.      Left: Normal. No swelling, mass, nipple discharge, skin change or tenderness.      Comments: Surgical sites well healed  Abdominal:      General: Bowel sounds are normal. There is no distension.      Palpations: Abdomen is soft. There is no mass.      Tenderness: There is no abdominal tenderness.   Musculoskeletal:      Cervical back: Neck supple.   Lymphadenopathy:      Head:      Right side of head: No submental or submandibular adenopathy.      Left side of head: No submental or submandibular adenopathy.      Cervical: No cervical adenopathy.      Upper Body:      Right upper body: No supraclavicular or axillary adenopathy.      Left upper body: No supraclavicular or axillary adenopathy.      Lower Body: No right inguinal adenopathy. No left inguinal adenopathy.   Skin:     General: Skin is warm.      Coloration: Skin is not jaundiced.      Findings: No lesion or rash.      Nails: There is no clubbing.   Neurological:      Mental Status: She is alert and oriented to person, place, and time.      Cranial Nerves: Cranial nerves 2-12 are intact.   Psychiatric:         Attention and Perception: Attention normal.         Behavior: Behavior is cooperative.         Judgment: Judgment normal.       ECOG SCORE    1 - Restricted in strenuous  activity-ambulatory and able to carry out work of a light nature         Laboratory:  CBC with Differential:  Lab Results   Component Value Date    WBC 4.47 (L) 05/22/2025    RBC 4.13 (L) 05/22/2025    HGB 12.1 05/22/2025    HCT 37.5 05/22/2025    MCV 90.8 05/22/2025    MCH 29.3 05/22/2025    MCHC 32.3 (L) 05/22/2025    RDW 13.3 05/22/2025     (L) 05/22/2025    MPV 11.1 (H) 05/22/2025        CMP:  Sodium   Date Value Ref Range Status   05/22/2025 143 136 - 145 mmol/L Final     Potassium   Date Value Ref Range Status   05/22/2025 5.0 3.5 - 5.1 mmol/L Final     Chloride   Date Value Ref Range Status   05/22/2025 109 (H) 98 - 107 mmol/L Final     CO2   Date Value Ref Range Status   05/22/2025 27 23 - 31 mmol/L Final     Glucose   Date Value Ref Range Status   05/22/2025 108 82 - 115 mg/dL Final     Blood Urea Nitrogen   Date Value Ref Range Status   05/22/2025 18.4 9.8 - 20.1 mg/dL Final     Creatinine   Date Value Ref Range Status   05/22/2025 0.83 0.55 - 1.02 mg/dL Final     Calcium   Date Value Ref Range Status   05/22/2025 9.8 8.4 - 10.2 mg/dL Final     Protein Total   Date Value Ref Range Status   05/22/2025 7.4 5.8 - 7.6 gm/dL Final     Albumin   Date Value Ref Range Status   05/22/2025 3.8 3.4 - 4.8 g/dL Final     Bilirubin Total   Date Value Ref Range Status   05/22/2025 0.6 <=1.5 mg/dL Final     ALP   Date Value Ref Range Status   05/22/2025 50 40 - 150 unit/L Final     AST   Date Value Ref Range Status   05/22/2025 29 11 - 45 unit/L Final     ALT   Date Value Ref Range Status   05/22/2025 24 0 - 55 unit/L Final     Estimated GFR-Non    Date Value Ref Range Status   05/20/2022 >60 mls/min/1.73/m2 Final         Assessment:       1. Malignant neoplasm of central portion of right breast in female, estrogen receptor positive    2. Malignant neoplasm of colon, unspecified part of colon    3. HER2-positive carcinoma of breast    4. Aromatase inhibitor use    5. Osteopenia, unspecified location            1.) Invasive lobular carcinoma right breast Dx 8/26//2024  ---ER 89.40%, [place] 82.10%, HER2 equivocal, 2+, amplified by fish, Ki 67, high proliferation, 57.30%   ---status post right lumpectomy with sentinel lymph node biopsy on 10/9/2024--Invasive Pleomorphic lobular carcinoma   ---T2 N0 M0 stage IB, G3, ER/SC/HER2 positive    Review with her and daughter again today; diagnosis, prognosis and treatment recommendations according to NCCN guidelines.  Even though she is an elderly patient she will benefit of Herceptin base regimen followed by hormonal therapy once chemotherapy completed.  Because of her age recommend taxane with Herceptin.  Patient is adamant that she will not have chemotherapy or radiation therapy (not indicated at her age, also LN neg).  She does not opposed to try Herceptin and aromatase inhibitor.    --Patient with osteopenia, therefore we will start Prolia with letrozole for bone health and to decreased risk of pathological fractures and development of osteoporosis    2.) pT2pN0M0--Stage I colon cancer--diagnosed in April 2021   -3/15/2021:Colonoscopy: malignant partially obstructing tumor in the ascending colon. Biopsy tubular adenoma with at least high-grade dysplasia.    -4/22/2021: Laparoscopic/robotic right hemicolectomy    -Path: Adenocarcinoma superficially invades muscularis propria. Eighteen mesenteric lymph nodes, no neoplasm (0/18).     NCCN guidelines (COL-3) for pathological stage T2, N0, M0   Surveillance (COL-8): colonoscopy in 1 year after surgery and then in 1 year for advanced adenoma, if no advanced adenoma then repeat in 3 yrs and then q 5 yrs.   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  These are benign lesions.    4) Liver disease--Cirrhosis of the liver with Splenomegaly and ascites. Followed by  GI    5) Heart disease- s/p stent placement  --ECHO with normal EF 60-65%        Plan:         Kanjinti C10 - due today  Echo due 07/2025 - ordered  RTC in 3 weeks with MD/NP/same day labs/infusion  Labs: CBC, CMP, CA 27.29    The patient was seen, interviewed and examined. Pertinent lab and radiology studies were reviewed.   The patient was given ample opportunity to ask questions, and to the best of my abilities, all questions answered to satisfaction; patient demonstrated understanding of what we discussed and agreeable to the plan. Pt instructed to call should develop concerning signs/symptoms or have further questions.     Visit today included increased complexity associated with the care of the episodic problem carcinoma right breast, addressing and managing the longitudinal care of the patient's carcinoma right breast.       Adriana Kapadia MD  Hematology/Oncology  [facility]      Professional Services   I, Yomaira Nicolas LPN, acted solely as a scribe for and in the presence of Dr. Adriana Kapadia, who performed these services.

## 2025-05-22 ENCOUNTER — OFFICE VISIT (OUTPATIENT)
Dept: HEMATOLOGY/ONCOLOGY | Facility: CLINIC | Age: 85
End: 2025-05-22
Payer: MEDICARE

## 2025-05-22 ENCOUNTER — INFUSION (OUTPATIENT)
Dept: INFUSION THERAPY | Facility: HOSPITAL | Age: 85
End: 2025-05-22
Attending: INTERNAL MEDICINE
Payer: MEDICARE

## 2025-05-22 VITALS
BODY MASS INDEX: 28.17 KG/M2 | OXYGEN SATURATION: 98 % | DIASTOLIC BLOOD PRESSURE: 83 MMHG | TEMPERATURE: 98 F | HEART RATE: 66 BPM | HEIGHT: 64 IN | RESPIRATION RATE: 18 BRPM | SYSTOLIC BLOOD PRESSURE: 145 MMHG | WEIGHT: 165 LBS

## 2025-05-22 DIAGNOSIS — Z17.0 MALIGNANT NEOPLASM OF CENTRAL PORTION OF RIGHT BREAST IN FEMALE, ESTROGEN RECEPTOR POSITIVE: ICD-10-CM

## 2025-05-22 DIAGNOSIS — C18.9 MALIGNANT NEOPLASM OF COLON, UNSPECIFIED PART OF COLON: ICD-10-CM

## 2025-05-22 DIAGNOSIS — C50.919 HER2-POSITIVE CARCINOMA OF BREAST: Primary | ICD-10-CM

## 2025-05-22 DIAGNOSIS — C50.919 HER2-POSITIVE CARCINOMA OF BREAST: ICD-10-CM

## 2025-05-22 DIAGNOSIS — C50.111 MALIGNANT NEOPLASM OF CENTRAL PORTION OF RIGHT BREAST IN FEMALE, ESTROGEN RECEPTOR POSITIVE: Primary | ICD-10-CM

## 2025-05-22 DIAGNOSIS — Z17.31 HER2-POSITIVE CARCINOMA OF BREAST: Primary | ICD-10-CM

## 2025-05-22 DIAGNOSIS — Z79.811 AROMATASE INHIBITOR USE: ICD-10-CM

## 2025-05-22 DIAGNOSIS — M85.80 OSTEOPENIA, UNSPECIFIED LOCATION: ICD-10-CM

## 2025-05-22 DIAGNOSIS — Z17.0 MALIGNANT NEOPLASM OF CENTRAL PORTION OF RIGHT BREAST IN FEMALE, ESTROGEN RECEPTOR POSITIVE: Primary | ICD-10-CM

## 2025-05-22 DIAGNOSIS — C50.111 MALIGNANT NEOPLASM OF CENTRAL PORTION OF RIGHT BREAST IN FEMALE, ESTROGEN RECEPTOR POSITIVE: ICD-10-CM

## 2025-05-22 DIAGNOSIS — Z17.31 HER2-POSITIVE CARCINOMA OF BREAST: ICD-10-CM

## 2025-05-22 PROCEDURE — 63600175 PHARM REV CODE 636 W HCPCS: Mod: TB | Performed by: INTERNAL MEDICINE

## 2025-05-22 PROCEDURE — 96413 CHEMO IV INFUSION 1 HR: CPT

## 2025-05-22 PROCEDURE — 99999 PR PBB SHADOW E&M-EST. PATIENT-LVL V: CPT | Mod: PBBFAC,,, | Performed by: INTERNAL MEDICINE

## 2025-05-22 PROCEDURE — 96372 THER/PROPH/DIAG INJ SC/IM: CPT

## 2025-05-22 PROCEDURE — 99215 OFFICE O/P EST HI 40 MIN: CPT | Mod: PBBFAC,25 | Performed by: INTERNAL MEDICINE

## 2025-05-22 PROCEDURE — 63600175 PHARM REV CODE 636 W HCPCS: Mod: JZ,TB | Performed by: NURSE PRACTITIONER

## 2025-05-22 PROCEDURE — 25000003 PHARM REV CODE 250: Performed by: INTERNAL MEDICINE

## 2025-05-22 RX ORDER — SODIUM CHLORIDE 0.9 % (FLUSH) 0.9 %
10 SYRINGE (ML) INJECTION
Status: DISCONTINUED | OUTPATIENT
Start: 2025-05-22 | End: 2025-05-22 | Stop reason: HOSPADM

## 2025-05-22 RX ORDER — HEPARIN 100 UNIT/ML
500 SYRINGE INTRAVENOUS
Status: CANCELLED | OUTPATIENT
Start: 2025-05-22

## 2025-05-22 RX ORDER — EPINEPHRINE 0.3 MG/.3ML
0.3 INJECTION SUBCUTANEOUS ONCE AS NEEDED
Status: DISCONTINUED | OUTPATIENT
Start: 2025-05-22 | End: 2025-05-22 | Stop reason: HOSPADM

## 2025-05-22 RX ORDER — SODIUM CHLORIDE 0.9 % (FLUSH) 0.9 %
10 SYRINGE (ML) INJECTION
Status: CANCELLED | OUTPATIENT
Start: 2025-05-22

## 2025-05-22 RX ORDER — EPINEPHRINE 0.3 MG/.3ML
0.3 INJECTION SUBCUTANEOUS ONCE AS NEEDED
Status: CANCELLED | OUTPATIENT
Start: 2025-05-22

## 2025-05-22 RX ORDER — HEPARIN 100 UNIT/ML
500 SYRINGE INTRAVENOUS
Status: DISCONTINUED | OUTPATIENT
Start: 2025-05-22 | End: 2025-05-22 | Stop reason: HOSPADM

## 2025-05-22 RX ORDER — DIPHENHYDRAMINE HYDROCHLORIDE 50 MG/ML
50 INJECTION, SOLUTION INTRAMUSCULAR; INTRAVENOUS ONCE AS NEEDED
Status: DISCONTINUED | OUTPATIENT
Start: 2025-05-22 | End: 2025-05-22 | Stop reason: HOSPADM

## 2025-05-22 RX ORDER — DIPHENHYDRAMINE HYDROCHLORIDE 50 MG/ML
50 INJECTION, SOLUTION INTRAMUSCULAR; INTRAVENOUS ONCE AS NEEDED
Status: CANCELLED | OUTPATIENT
Start: 2025-05-22

## 2025-05-22 RX ADMIN — DENOSUMAB 60 MG: 60 INJECTION SUBCUTANEOUS at 02:05

## 2025-05-22 RX ADMIN — SODIUM CHLORIDE: 9 INJECTION, SOLUTION INTRAVENOUS at 01:05

## 2025-05-22 RX ADMIN — TRASTUZUMAB-ANNS 456 MG: 420 INJECTION, POWDER, LYOPHILIZED, FOR SOLUTION INTRAVENOUS at 01:05

## 2025-05-22 NOTE — PLAN OF CARE
Prolia injection given, tolerated well. Pt denied any recent dental work within the past 3 months as well as any upcoming invasive dental procedures. C10 Kanjinti given, tolerated well. Pt discharged in stable condition, next appt given.

## 2025-06-11 DIAGNOSIS — C18.9 MALIGNANT NEOPLASM OF COLON, UNSPECIFIED PART OF COLON: ICD-10-CM

## 2025-06-11 DIAGNOSIS — C50.111 MALIGNANT NEOPLASM OF CENTRAL PORTION OF RIGHT BREAST IN FEMALE, ESTROGEN RECEPTOR POSITIVE: Primary | ICD-10-CM

## 2025-06-11 DIAGNOSIS — Z17.0 MALIGNANT NEOPLASM OF CENTRAL PORTION OF RIGHT BREAST IN FEMALE, ESTROGEN RECEPTOR POSITIVE: Primary | ICD-10-CM

## 2025-06-12 ENCOUNTER — OFFICE VISIT (OUTPATIENT)
Dept: HEMATOLOGY/ONCOLOGY | Facility: CLINIC | Age: 85
End: 2025-06-12
Payer: MEDICARE

## 2025-06-12 ENCOUNTER — INFUSION (OUTPATIENT)
Dept: INFUSION THERAPY | Facility: HOSPITAL | Age: 85
End: 2025-06-12
Attending: INTERNAL MEDICINE
Payer: MEDICARE

## 2025-06-12 VITALS
BODY MASS INDEX: 26.41 KG/M2 | SYSTOLIC BLOOD PRESSURE: 117 MMHG | WEIGHT: 154.69 LBS | OXYGEN SATURATION: 95 % | HEIGHT: 64 IN | TEMPERATURE: 98 F | HEART RATE: 87 BPM | DIASTOLIC BLOOD PRESSURE: 51 MMHG | RESPIRATION RATE: 14 BRPM

## 2025-06-12 VITALS — DIASTOLIC BLOOD PRESSURE: 51 MMHG | SYSTOLIC BLOOD PRESSURE: 117 MMHG

## 2025-06-12 DIAGNOSIS — M85.80 OSTEOPENIA, UNSPECIFIED LOCATION: ICD-10-CM

## 2025-06-12 DIAGNOSIS — Z17.31 HER2-POSITIVE CARCINOMA OF BREAST: Primary | ICD-10-CM

## 2025-06-12 DIAGNOSIS — C50.111 MALIGNANT NEOPLASM OF CENTRAL PORTION OF RIGHT BREAST IN FEMALE, ESTROGEN RECEPTOR POSITIVE: ICD-10-CM

## 2025-06-12 DIAGNOSIS — C50.919 HER2-POSITIVE CARCINOMA OF BREAST: ICD-10-CM

## 2025-06-12 DIAGNOSIS — Z17.0 MALIGNANT NEOPLASM OF CENTRAL PORTION OF RIGHT BREAST IN FEMALE, ESTROGEN RECEPTOR POSITIVE: ICD-10-CM

## 2025-06-12 DIAGNOSIS — E55.9 VITAMIN D DEFICIENCY: ICD-10-CM

## 2025-06-12 DIAGNOSIS — D72.819 LEUKOPENIA, UNSPECIFIED TYPE: ICD-10-CM

## 2025-06-12 DIAGNOSIS — C50.111 MALIGNANT NEOPLASM OF CENTRAL PORTION OF RIGHT BREAST IN FEMALE, ESTROGEN RECEPTOR POSITIVE: Primary | ICD-10-CM

## 2025-06-12 DIAGNOSIS — Z79.899 ENCOUNTER FOR MONITORING CARDIOTOXIC DRUG THERAPY: ICD-10-CM

## 2025-06-12 DIAGNOSIS — D69.6 THROMBOCYTOPENIA: ICD-10-CM

## 2025-06-12 DIAGNOSIS — Z51.81 ENCOUNTER FOR MONITORING CARDIOTOXIC DRUG THERAPY: ICD-10-CM

## 2025-06-12 DIAGNOSIS — Z17.31 HER2-POSITIVE CARCINOMA OF BREAST: ICD-10-CM

## 2025-06-12 DIAGNOSIS — E03.9 HYPOTHYROIDISM, UNSPECIFIED TYPE: ICD-10-CM

## 2025-06-12 DIAGNOSIS — C18.2 MALIGNANT NEOPLASM OF ASCENDING COLON: ICD-10-CM

## 2025-06-12 DIAGNOSIS — Z79.811 AROMATASE INHIBITOR USE: ICD-10-CM

## 2025-06-12 DIAGNOSIS — Z17.0 MALIGNANT NEOPLASM OF CENTRAL PORTION OF RIGHT BREAST IN FEMALE, ESTROGEN RECEPTOR POSITIVE: Primary | ICD-10-CM

## 2025-06-12 DIAGNOSIS — C50.919 HER2-POSITIVE CARCINOMA OF BREAST: Primary | ICD-10-CM

## 2025-06-12 DIAGNOSIS — C18.9 MALIGNANT NEOPLASM OF COLON, UNSPECIFIED PART OF COLON: ICD-10-CM

## 2025-06-12 PROCEDURE — 99215 OFFICE O/P EST HI 40 MIN: CPT | Mod: PBBFAC | Performed by: NURSE PRACTITIONER

## 2025-06-12 PROCEDURE — 25000003 PHARM REV CODE 250: Performed by: NURSE PRACTITIONER

## 2025-06-12 PROCEDURE — 96413 CHEMO IV INFUSION 1 HR: CPT

## 2025-06-12 PROCEDURE — 63600175 PHARM REV CODE 636 W HCPCS: Mod: TB | Performed by: NURSE PRACTITIONER

## 2025-06-12 PROCEDURE — 99999 PR PBB SHADOW E&M-EST. PATIENT-LVL V: CPT | Mod: PBBFAC,,, | Performed by: NURSE PRACTITIONER

## 2025-06-12 RX ORDER — EPINEPHRINE 0.3 MG/.3ML
0.3 INJECTION SUBCUTANEOUS ONCE AS NEEDED
Status: CANCELLED | OUTPATIENT
Start: 2025-06-12

## 2025-06-12 RX ORDER — SODIUM CHLORIDE 0.9 % (FLUSH) 0.9 %
10 SYRINGE (ML) INJECTION
Status: DISCONTINUED | OUTPATIENT
Start: 2025-06-12 | End: 2025-06-12 | Stop reason: HOSPADM

## 2025-06-12 RX ORDER — HEPARIN 100 UNIT/ML
500 SYRINGE INTRAVENOUS
Status: DISCONTINUED | OUTPATIENT
Start: 2025-06-12 | End: 2025-06-12 | Stop reason: HOSPADM

## 2025-06-12 RX ORDER — HEPARIN 100 UNIT/ML
500 SYRINGE INTRAVENOUS
Status: CANCELLED | OUTPATIENT
Start: 2025-06-12

## 2025-06-12 RX ORDER — DIPHENHYDRAMINE HYDROCHLORIDE 50 MG/ML
50 INJECTION, SOLUTION INTRAMUSCULAR; INTRAVENOUS ONCE AS NEEDED
Status: DISCONTINUED | OUTPATIENT
Start: 2025-06-12 | End: 2025-06-12 | Stop reason: HOSPADM

## 2025-06-12 RX ORDER — SODIUM CHLORIDE 0.9 % (FLUSH) 0.9 %
10 SYRINGE (ML) INJECTION
Status: CANCELLED | OUTPATIENT
Start: 2025-06-12

## 2025-06-12 RX ORDER — DIPHENHYDRAMINE HYDROCHLORIDE 50 MG/ML
50 INJECTION, SOLUTION INTRAMUSCULAR; INTRAVENOUS ONCE AS NEEDED
Status: CANCELLED | OUTPATIENT
Start: 2025-06-12

## 2025-06-12 RX ORDER — EPINEPHRINE 0.3 MG/.3ML
0.3 INJECTION SUBCUTANEOUS ONCE AS NEEDED
Status: DISCONTINUED | OUTPATIENT
Start: 2025-06-12 | End: 2025-06-12 | Stop reason: HOSPADM

## 2025-06-12 RX ADMIN — TRASTUZUMAB-ANNS 456 MG: 420 INJECTION, POWDER, LYOPHILIZED, FOR SOLUTION INTRAVENOUS at 11:06

## 2025-06-12 NOTE — PROGRESS NOTES
HEMATOLOGY/ONCOLOGY OFFICE CLINIC VISIT    Visit Information:    Initial Evaluation: 5/26/2021  Referring Provider: Dr. Robert Conner  Other providers:  Code status:  Not addressed    Diagnosis:  1) T2N0M0-Stage I Colon cancer. 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   Letrozole started 11/2024    Treatment/Oncology history:  -03/15/2021: Colonoscopy: malignant partially obstructing tumor in the ascending colon. Biopsy tubular adenoma with at least high-grade dysplasia.   -04/22/2021: Laparoscopic/robotic right hemicolectomy   -08/15/2024: Abnormal right breast mammogram BI-RADS category 4, suspicious  -08/26/2024: Ultrasound-guided right breast biopsy at 1:00 a.m. position--> invasive lobular carcinoma  -10/09/2024: s/p right lumpectomy with sentinel lymph node biopsy  -postlumpectomy radiation not indicated  - Herceptin started on 11/14/24 + Letrozole started 11/2024      Imaging:  CT A/P 3/17/2021 at [facility]: irregular colonic mass 5 to 6 cm in length proximal to midportion of descending colon.  Enhancing soft tissue component contacts and may invade the right lateral abdominal wall.  No regional or distant lymphadenopathy identified.  Cirrhotic liver with portal hypertension, small volume ascites and splenomegaly.  No focal lesion identified in the liver.    CT of the thorax 4/5/2021: no thoracic metastatic disease identified.  Small volume ascites and borderline splenomegaly.No adenopathy or distant metastases.  CT angiogram 5/20/2021: negative for pulmonary embolism but lungs demonstrate mild heterogeneous attenuation with subtle peripheral reticular opacities.  Primary differential consideration is small airways disease.  Findings may be secondary to multifocal infiltrate from infection versus  pulmonary edema.  It also showed moderate ascites with increase in size since prior examination.  Liver with nodular contour and was enlarged.  Spleen borderline enlarged.  A 1.3 x 1 cm enhancing nodule in the left adrenal gland no significant change.  Ultrasound of the abdomen 5/23/2021:cirrhosis of the liver with hepatosplenomegaly and ascites.   Ultrasound of the abdomen 10/26/2022: The pancreas appears grossly unremarkable.  No pancreatic mass or lesion is seen. liver is slightly enlarged in size. Liver is echogenic it measures 16 cm by my measurements..  No liver mass or lesion is seen. spleen appears enlarged and measures 14.5 cm.  Hepatomegaly with findings consistent with hepatic steatosis  CT C/A/P 2/16/2023:     1. No evidence of metastatic disease within the chest, abdomen and pelvis  2. Cirrhotic morphology of the liver  3. Changes of portal hypertension including borderline splenomegaly and portosystemic collateral  4. Mild interstitial scarring within the lungs.  CT C/A/P  8/28/2023:  No new suspicious findings chest, abdomen or pelvis.  Chronic findings above.  CT C/A/P 2/21/2024: No detrimental change identified since 08/28/2023.   CT 8/22/2024: Similar mild chronic changes in the lungs with no suspicious pulmonary nodule.  Moderate coronary artery calcification.  No pleural or pericardial effusion.  No pathologically enlarged thoracic lymph node. Cirrhosis with mild hepatic steatosis.  Similar mild splenomegaly.  Stable pancreas, adrenal glands and kidneys.  Very small hiatal hernia.  Normal caliber small bowel and colon with right mid abdominal ileal colonic anastomosis.  Distal colonic diverticulosis.  Normal bladder.  No ascites or abdominal/pelvic lymphadenopathy.  Aortoiliac atherosclerosis. No aggressive osseous lesion.No metastatic disease identified.     ECHO:  10/17/24, LVEF 60-65%  02/18/24, HOAG93-08%  4/1/2025, KTLH31-65%    Bone Density:  8/9/24 @ [facility]:  Osteopenia      Pathology:  4/22/2022:   RIGHT COLON, HEMICOLECTOMY: ADENOCARCINOMA, WELL DIFFERENTIATED, 90% MUCINOUS.   --  Greatest tumor dimension, 7.0 cm (as measured grossly).  --  Adenocarcinoma arises from tubular adenoma with high grade dysplasia.  --  Lymphovascular invasion, not identified.  --  Adenocarcinoma superficially invades muscularis propria.  --  Eighteen mesenteric lymph nodes, no neoplasm (0/18).  --  Surgical margins, uninvolved.  POSTOPERATIVE SPINDLE CELL PSEUDOTUMOR, 1.5 CM.  -  The pseudotumor extends into mesenteric adipose tissue.     9/24/2021:  LIVER, CORE NEEDLE BIOPSY: FOCAL PORTAL TRIADITIS WITH SINUS DILATION.   - NO INTERFACE ACTIVITY.   - BRIDING FIBROSIS (FIBROSIS STAGE  F2-3)  - NO STEATOSIS.  Comment: Despite hepatitis testing demonstrating reactivity for hepatitis A in July 2021, no evidence of acute hepatitis is identified and LFTs are currently within normal limits. This case has also been reviewed in intradepartmental consultation.    8/26/2024:  RIGHT BREAST, 1 O'CLOCK MASS ULTRASOUND GUIDED VACUUM BIOPSY:  - Invasive carcinoma with lobular features, Xavier Grade 3, see comment.  - Carcinoma involving 3 of 4 cores, 7 mm in longest linear extent.  ER 89.40%, ND 82.10%, HER2 equivocal, 2+, amplified by fish, Ki 67, high proliferation, 57.30%    10/9/2024:  1. BREAST, RIGHT, LUMPECTOMY:   PLEOMORPHIC LOBULAR CARCINOMA   DUCTAL CARCINOMA IN SITU WITH LOBULAR EXTENSION.   2. BREAST, SUPERIOR MARGIN, REEXCISION: NEGATIVE FOR INVASIVE AND IN SITU CARCINOMA.   3. AXILLA, ADDITIONAL AXILLA CONTENTS, EXCISION: TWO LYMPH NODES, NEGATIVE FOR METASTATIC CARCINOMA.   4. LYMPH NODE, RIGHT SENTINEL #1, EXCISION: ONE LYMPH NODE, NEGATIVE FOR METASTATIC CARCINOMA.   5. LYMPH NODE, RIGHT SENTINEL #2, EXCISION: ONE LYMPH NODE, NEGATIVE FOR METASTATIC CARCINOMA.   6. LYMPH NODES, RIGHT SENTINEL #3, EXCISION: TWO LYMPH NODES, NEGATIVE FOR METASTATIC CARCINOMA.     TUMOR Histologic Type:   Invasive carcinoma with features of : Pleomorphic lobular   Histologic Grade ([place] Histologic Score):  Score 3   Tumor Size: 21 mm   Tumor Focality:  Single focus of invasive carcinoma   Ductal Carcinoma In Situ (DCIS):  Present   Lymphovascular Invasion:  Cannot be determined: Suspicious for LVI   MARGINS:  All margins negative for invasive carcinoma   REGIONAL LYMPH NODES   Total Number of Lymph Nodes Examined (sentinel and non-sentinel):  Exact number : 5   Number of [place] Nodes Examined:  Exact number : 3      PATHOLOGIC STAGE CLASSIFICATION (pTNM, AJCC 8th Edition)   pT2pN0           CLINICAL HISTORY:       Patient: 85 year old female with multiple medical problems kindly referred for colon cancer.  She initially presented with anemia and fecal occult blood positive.  She also reports 20 pounds weight loss over the last previous 6 months. Colonoscopy on 3/15/2021 revealed a malignant partially obstructing tumor in the ascending colon. Biopsy showed tubular adenoma with at least high-grade dysplasia.  On 3/17/2021 she underwent a CT scan of the abdomen and pelvis at [facility] that showed an irregular colonic mass measuring about 5 to 6 cm in length involving proximal to midportion of descending colon.  Enhancing soft tissue component contacts and may invade the right lateral abdominal wall.  No regional or distant lymphadenopathy identified.  Cirrhotic liver with portal hypertension, small volume ascites and splenomegaly.  No focal lesion identified in the liver.  Staging CT of the thorax 4/5/2021 with no thoracic metastatic disease identified.  Small volume ascites and borderline splenomegaly.No adenopathy or distant metastases.   Patient was seen by Dr. Robert Conner and she underwent laparoscopic/robotic right hemicolectomy on 4/22/2021.  Pathology report as follows:   RIGHT COLON, HEMICOLECTOMY: ADENOCARCINOMA, WELL DIFFERENTIATED, 90% MUCINOUS. 0/18 LN positive for malignanacy.See above for  details.  Patient recovered from the surgery but came back to the emergency department for chest pain. She underwent CT angiogram that was negative for pulmonary embolism but lungs demonstrate mild heterogeneous attenuation with subtle peripheral reticular opacities.  Primary differential consideration is small airways disease.  Findings may be secondary to multifocal infiltrate from infection versus pulmonary edema.  It also showed moderate ascites with increase in size since prior examination.  Liver with nodular contour and was enlarged.  Spleen borderline enlarged.  1.3 x 1 cm enhancing nodule in the left adrenal gland no significant change.  She underwent ultrasound of the abdomen that confirmed the diagnosis of cirrhosis of the liver with hepatosplenomegaly and ascites.  She was discharged on 5/20/2021.  She stopped drinking at age 38, she says that she is a social drinker. She denies any family history of colon cancer. She denies any fever, chills, or sweats. No chest pain or shortness of breath.  She had liver biopsy ordered by Dr. Louis on 9/24/21. It was ordered for cirrhosis. She has no history of hepatitis infection or alcohol abuse. Biopsy was negative for malignancy. Just  fibrosis.         Screening mammogram was abnormal.  On 08/15/2024 diagnostic right breast mammogram and ultrasound showed a 1 cm hypoechoic mass with irregular margins and shadowing in the right breast at 1 o'clock position and suspicious for malignancy.  He has was read BI-RADS category 4.    On 10/09/2024 she underwent right breast lumpectomy.  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 Conner.   Herceptin and letrozole and so far tolerating  treatment well.       05/22/25: Patient presents today for 3 week follow up of her breast cancer on active treatment with Herceptin and letrozole. She is due for Kanjinti C10, okay to give. She is doing well and has no new complaints today. She has persistent diarrhea that is chronic and not associated with treatment. This is not worsening.    06/12/25  Patient seen in clinic today with her daughter. Due today for Trastuzumab cycle 11 given q 3 weeks, labs reviewed stable for treatment. She is doing well with no complaints. There has been an 8lb weight loss, the patient states she eats when she is hungry but her family states she eats very small portions. She continues to suffer from diarrhea which is a chronic condition and is not worsening.  She reports doing breast exams at home with no abnormalities found.  She denies night sweats, mood changes, bothersome vaginal dryness. Denies chest pain, SOB, abd pain. Denies any blood seen in stool or black stools.     ROS: All 14 points ROS taken and as per Interval History  Review of Systems   Constitutional:  Negative for chills, fever and weight loss.   HENT:  Negative for congestion and nosebleeds.    Eyes:  Negative for blurred vision, double vision and photophobia.   Respiratory:  Negative for cough, hemoptysis and shortness of breath.    Cardiovascular:  Negative for chest pain, palpitations, leg swelling and PND.   Gastrointestinal:  Positive for diarrhea. Negative for abdominal pain, blood in stool, constipation, melena, nausea and vomiting.   Genitourinary:  Positive for frequency. Negative for dysuria, hematuria and urgency.   Musculoskeletal:  Negative for back pain, falls and myalgias.   Skin:  Negative for itching and rash.   Neurological:  Negative for tremors, focal weakness, seizures, weakness and headaches.   Endo/Heme/Allergies:  Negative for environmental allergies. Does not bruise/bleed easily.   Psychiatric/Behavioral:  Negative for depression and  "suicidal ideas. The patient is not nervous/anxious.      Histories:  PMH/PSH/FH/SOCIAL/ALLERGIES AND MEDS REVIEWED AND UPDATED AS APPROPRIATE       Vitals:    06/12/25 1018   BP: (!) 117/51   BP Location: Left arm   Patient Position: Sitting   Pulse: 87   Resp: 14   Temp: 97.9 °F (36.6 °C)   TempSrc: Oral   SpO2: 95%   Weight: 70.2 kg (154 lb 11.2 oz)   Height: 5' 4" (1.626 m)           Wt Readings from Last 6 Encounters:   06/12/25 70.2 kg (154 lb 11.2 oz)   05/22/25 74.8 kg (165 lb)   04/30/25 76.3 kg (168 lb 4.8 oz)   04/09/25 74.6 kg (164 lb 8 oz)   03/19/25 75.6 kg (166 lb 10.7 oz)   03/19/25 75.6 kg (166 lb 11.2 oz)     Body mass index is 26.55 kg/m².  Body surface area is 1.78 meters squared.       Physical Exam  Constitutional:       Appearance: Normal appearance.   HENT:      Head: Normocephalic and atraumatic.   Eyes:      General: No scleral icterus.     Extraocular Movements: Extraocular movements intact.      Conjunctiva/sclera: Conjunctivae normal.   Neck:      Vascular: No JVD.   Cardiovascular:      Rate and Rhythm: Normal rate and regular rhythm.      Heart sounds: No murmur heard.  Pulmonary:      Effort: Pulmonary effort is normal.      Breath sounds: Normal breath sounds. No wheezing or rhonchi.   Chest:   Breasts:     Right: Normal. No swelling, mass, nipple discharge, skin change or tenderness.      Left: Normal. No swelling, mass, nipple discharge, skin change or tenderness.      Comments: Surgical sites well healed  Abdominal:      General: Bowel sounds are normal. There is no distension.      Palpations: Abdomen is soft. There is no mass.      Tenderness: There is no abdominal tenderness.   Musculoskeletal:      Cervical back: Neck supple.   Lymphadenopathy:      Head:      Right side of head: No submental or submandibular adenopathy.      Left side of head: No submental or submandibular adenopathy.      Cervical: No cervical adenopathy.      Upper Body:      Right upper body: No " supraclavicular or axillary adenopathy.      Left upper body: No supraclavicular or axillary adenopathy.      Lower Body: No right inguinal adenopathy. No left inguinal adenopathy.   Skin:     General: Skin is warm.      Coloration: Skin is not jaundiced.      Findings: No lesion or rash.      Nails: There is no clubbing.   Neurological:      Mental Status: She is alert and oriented to person, place, and time.      Cranial Nerves: Cranial nerves 2-12 are intact.   Psychiatric:         Attention and Perception: Attention normal.         Behavior: Behavior is cooperative.         Judgment: Judgment normal.       ECOG SCORE             Laboratory:  CBC with Differential:  Lab Results   Component Value Date    WBC 4.74 06/12/2025    RBC 4.33 06/12/2025    HGB 12.5 06/12/2025    HCT 38.5 06/12/2025    MCV 88.9 06/12/2025    MCH 28.9 06/12/2025    MCHC 32.5 (L) 06/12/2025    RDW 13.4 06/12/2025     (L) 06/12/2025    MPV 10.8 (H) 06/12/2025        CMP:  Sodium   Date Value Ref Range Status   06/12/2025 142 136 - 145 mmol/L Final     Potassium   Date Value Ref Range Status   06/12/2025 4.1 3.5 - 5.1 mmol/L Final     Chloride   Date Value Ref Range Status   06/12/2025 107 98 - 107 mmol/L Final     CO2   Date Value Ref Range Status   06/12/2025 30 23 - 31 mmol/L Final     Glucose   Date Value Ref Range Status   06/12/2025 118 (H) 82 - 115 mg/dL Final     Blood Urea Nitrogen   Date Value Ref Range Status   06/12/2025 19.6 9.8 - 20.1 mg/dL Final     Creatinine   Date Value Ref Range Status   06/12/2025 0.79 0.55 - 1.02 mg/dL Final     Calcium   Date Value Ref Range Status   06/12/2025 9.9 8.4 - 10.2 mg/dL Final     Protein Total   Date Value Ref Range Status   06/12/2025 7.6 5.8 - 7.6 gm/dL Final     Albumin   Date Value Ref Range Status   06/12/2025 3.7 3.4 - 4.8 g/dL Final     Bilirubin Total   Date Value Ref Range Status   06/12/2025 0.8 <=1.5 mg/dL Final     ALP   Date Value Ref Range Status   06/12/2025 46 40 - 150  unit/L Final     AST   Date Value Ref Range Status   06/12/2025 22 11 - 45 unit/L Final     ALT   Date Value Ref Range Status   06/12/2025 18 0 - 55 unit/L Final     Estimated GFR-Non    Date Value Ref Range Status   05/20/2022 >60 mls/min/1.73/m2 Final         Assessment:       No diagnosis found.       1.) Invasive lobular carcinoma right breast Dx 8/26//2024  ---ER 89.40%, LA 82.10%, HER2 equivocal, 2+, amplified by fish, Ki 67, high proliferation, 57.30%   ---status post right lumpectomy with sentinel lymph node biopsy on 10/9/2024--Invasive Pleomorphic lobular carcinoma   ---T2 N0 M0 stage IB, G3, ER/LA/HER2 positive    Review with her and daughter again today; diagnosis, prognosis and treatment recommendations according to NCCN guidelines.  Even though she is an elderly patient she will benefit of Herceptin base regimen followed by hormonal therapy once chemotherapy completed.  Because of her age recommend taxane with Herceptin.  Patient is adamant that she will not have chemotherapy or radiation therapy (not indicated at her age, also LN neg).  She does not opposed to try Herceptin and aromatase inhibitor.    --Patient with osteopenia, therefore we will start Prolia with letrozole for bone health and to decreased risk of pathological fractures and development of osteoporosis    2.) pT2pN0M0--Stage I colon cancer--diagnosed in April 2021   -3/15/2021:Colonoscopy: malignant partially obstructing tumor in the ascending colon. Biopsy tubular adenoma with at least high-grade dysplasia.    -4/22/2021: Laparoscopic/robotic right hemicolectomy    -Path: Adenocarcinoma superficially invades muscularis propria. Eighteen mesenteric lymph nodes, no neoplasm (0/18).     NCCN guidelines (COL-3) for pathological stage T2, N0, M0   Surveillance (COL-8): colonoscopy in 1 year after surgery and then in 1 year for advanced adenoma, if no advanced adenoma then repeat in 3 yrs and then q 5 yrs.   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  These are benign lesions.    4) Liver disease--Cirrhosis of the liver with Splenomegaly and ascites. Followed by GI    5) Heart disease- s/p stent placement  --ECHO with normal EF 60-65%        Plan:      Invasive lobular carcinoma right breast Dx 8/26//2024  Status: Ongoing  Continue Kanjinti C11- due today labs Stable  Echo scheduled for 07/3/2025, last echo 04/01/25 EF 60-65%  Next Mammogram due 10/2025  Continue Letrazole, denies bothersome side effects.  Benign clinic breat exam today.    Dx: pT2pN0M0--Stage I colon cancer--diagnosed in April 2021  Status:  Remission  Surveillance colonoscopy due 03/25, discussed with patient and daughter at length, she is adamant she does not want colonoscopy.  Denies any worrisome bowel changes. Chronic diarrhea  There has been 4 kg weight loss since last visit, we discussed increasing food consumption.    Dx: Osteopenia  Status: Ongoing  Dexa 08/09/24 Osteopenia in bilateral hips  Continue Prolia q 6mths last dose 05/22/25      Patient instructions and visit orders.       -Follow-up:  F/U with NP in 3 weeks  -Labs:  CBC, CMP, CEA, CA 27.29  -Other orders:  Echo already scheduled for July 3, 2025    45 were spent on this encounter    Nenita ORTIZ, FNP   Department of Hematology/Oncology.     This visit was conducted by Nenita Schafer NP as part of her orientation training. I, Jonn Noguera NP, collaborated with her and the visit reflects our discussion and plan.       Jonn Noguera, MSN, FNP-BC, APRN, AOCNP   Department of Hematology/Oncology.

## 2025-07-01 DIAGNOSIS — Z17.31 HER2-POSITIVE CARCINOMA OF BREAST: ICD-10-CM

## 2025-07-01 DIAGNOSIS — Z98.890 S/P LUMPECTOMY OF BREAST: ICD-10-CM

## 2025-07-01 DIAGNOSIS — C50.111 MALIGNANT NEOPLASM OF CENTRAL PORTION OF RIGHT BREAST IN FEMALE, ESTROGEN RECEPTOR POSITIVE: ICD-10-CM

## 2025-07-01 DIAGNOSIS — C18.2 MALIGNANT NEOPLASM OF ASCENDING COLON: ICD-10-CM

## 2025-07-01 DIAGNOSIS — Z17.0 MALIGNANT NEOPLASM OF CENTRAL PORTION OF RIGHT BREAST IN FEMALE, ESTROGEN RECEPTOR POSITIVE: ICD-10-CM

## 2025-07-01 DIAGNOSIS — C50.919 HER2-POSITIVE CARCINOMA OF BREAST: ICD-10-CM

## 2025-07-02 RX ORDER — LETROZOLE 2.5 MG/1
TABLET, FILM COATED ORAL
Qty: 90 TABLET | Refills: 0 | Status: SHIPPED | OUTPATIENT
Start: 2025-07-02

## 2025-07-03 ENCOUNTER — INFUSION (OUTPATIENT)
Dept: INFUSION THERAPY | Facility: HOSPITAL | Age: 85
End: 2025-07-03
Attending: INTERNAL MEDICINE
Payer: MEDICARE

## 2025-07-03 ENCOUNTER — OFFICE VISIT (OUTPATIENT)
Dept: HEMATOLOGY/ONCOLOGY | Facility: CLINIC | Age: 85
End: 2025-07-03
Payer: MEDICARE

## 2025-07-03 ENCOUNTER — LAB VISIT (OUTPATIENT)
Dept: LAB | Facility: HOSPITAL | Age: 85
End: 2025-07-03
Attending: INTERNAL MEDICINE
Payer: MEDICARE

## 2025-07-03 VITALS
HEIGHT: 64 IN | TEMPERATURE: 98 F | HEART RATE: 71 BPM | BODY MASS INDEX: 28.14 KG/M2 | SYSTOLIC BLOOD PRESSURE: 148 MMHG | DIASTOLIC BLOOD PRESSURE: 70 MMHG | WEIGHT: 164.81 LBS | OXYGEN SATURATION: 96 % | RESPIRATION RATE: 18 BRPM

## 2025-07-03 DIAGNOSIS — C18.9 MALIGNANT NEOPLASM OF COLON, UNSPECIFIED PART OF COLON: ICD-10-CM

## 2025-07-03 DIAGNOSIS — D72.819 LEUKOPENIA, UNSPECIFIED TYPE: ICD-10-CM

## 2025-07-03 DIAGNOSIS — C50.919 HER2-POSITIVE CARCINOMA OF BREAST: ICD-10-CM

## 2025-07-03 DIAGNOSIS — C50.111 MALIGNANT NEOPLASM OF CENTRAL PORTION OF RIGHT BREAST IN FEMALE, ESTROGEN RECEPTOR POSITIVE: ICD-10-CM

## 2025-07-03 DIAGNOSIS — Z17.31 HER2-POSITIVE CARCINOMA OF BREAST: Primary | ICD-10-CM

## 2025-07-03 DIAGNOSIS — C50.919 HER2-POSITIVE CARCINOMA OF BREAST: Primary | ICD-10-CM

## 2025-07-03 DIAGNOSIS — C18.2 MALIGNANT NEOPLASM OF ASCENDING COLON: ICD-10-CM

## 2025-07-03 DIAGNOSIS — Z17.0 MALIGNANT NEOPLASM OF CENTRAL PORTION OF RIGHT BREAST IN FEMALE, ESTROGEN RECEPTOR POSITIVE: ICD-10-CM

## 2025-07-03 DIAGNOSIS — Z51.81 ENCOUNTER FOR MONITORING CARDIOTOXIC DRUG THERAPY: ICD-10-CM

## 2025-07-03 DIAGNOSIS — Z17.0 MALIGNANT NEOPLASM OF CENTRAL PORTION OF RIGHT BREAST IN FEMALE, ESTROGEN RECEPTOR POSITIVE: Primary | ICD-10-CM

## 2025-07-03 DIAGNOSIS — C50.111 MALIGNANT NEOPLASM OF CENTRAL PORTION OF RIGHT BREAST IN FEMALE, ESTROGEN RECEPTOR POSITIVE: Primary | ICD-10-CM

## 2025-07-03 DIAGNOSIS — Z17.31 HER2-POSITIVE CARCINOMA OF BREAST: ICD-10-CM

## 2025-07-03 DIAGNOSIS — E55.9 VITAMIN D DEFICIENCY: ICD-10-CM

## 2025-07-03 DIAGNOSIS — Z79.899 ENCOUNTER FOR MONITORING CARDIOTOXIC DRUG THERAPY: ICD-10-CM

## 2025-07-03 DIAGNOSIS — M85.80 OSTEOPENIA, UNSPECIFIED LOCATION: ICD-10-CM

## 2025-07-03 DIAGNOSIS — E03.9 HYPOTHYROIDISM, UNSPECIFIED TYPE: ICD-10-CM

## 2025-07-03 DIAGNOSIS — D69.6 THROMBOCYTOPENIA: ICD-10-CM

## 2025-07-03 LAB
ALBUMIN SERPL-MCNC: 3.9 G/DL (ref 3.4–4.8)
ALBUMIN/GLOB SERPL: 1 RATIO (ref 1.1–2)
ALP SERPL-CCNC: 54 UNIT/L (ref 40–150)
ALT SERPL-CCNC: 23 UNIT/L (ref 0–55)
ANION GAP SERPL CALC-SCNC: 11 MEQ/L
AST SERPL-CCNC: 27 UNIT/L (ref 11–45)
BASOPHILS # BLD AUTO: 0.03 X10(3)/MCL
BASOPHILS NFR BLD AUTO: 0.6 %
BILIRUB SERPL-MCNC: 0.5 MG/DL
BUN SERPL-MCNC: 19.3 MG/DL (ref 9.8–20.1)
CALCIUM SERPL-MCNC: 10.5 MG/DL (ref 8.4–10.2)
CEA SERPL-MCNC: 2.96 NG/ML (ref 0–3)
CHLORIDE SERPL-SCNC: 107 MMOL/L (ref 98–107)
CO2 SERPL-SCNC: 26 MMOL/L (ref 23–31)
CREAT SERPL-MCNC: 0.76 MG/DL (ref 0.55–1.02)
CREAT/UREA NIT SERPL: 25
EOSINOPHIL # BLD AUTO: 0.17 X10(3)/MCL (ref 0–0.9)
EOSINOPHIL NFR BLD AUTO: 3.4 %
ERYTHROCYTE [DISTWIDTH] IN BLOOD BY AUTOMATED COUNT: 13.5 % (ref 11.5–17)
GFR SERPLBLD CREATININE-BSD FMLA CKD-EPI: >60 ML/MIN/1.73/M2
GLOBULIN SER-MCNC: 3.8 GM/DL (ref 2.4–3.5)
GLUCOSE SERPL-MCNC: 167 MG/DL (ref 82–115)
HCT VFR BLD AUTO: 39.1 % (ref 37–47)
HGB BLD-MCNC: 12.7 G/DL (ref 12–16)
IMM GRANULOCYTES # BLD AUTO: 0.01 X10(3)/MCL (ref 0–0.04)
IMM GRANULOCYTES NFR BLD AUTO: 0.2 %
LYMPHOCYTES # BLD AUTO: 1.39 X10(3)/MCL (ref 0.6–4.6)
LYMPHOCYTES NFR BLD AUTO: 27.6 %
MCH RBC QN AUTO: 29.1 PG (ref 27–31)
MCHC RBC AUTO-ENTMCNC: 32.5 G/DL (ref 33–36)
MCV RBC AUTO: 89.5 FL (ref 80–94)
MONOCYTES # BLD AUTO: 0.32 X10(3)/MCL (ref 0.1–1.3)
MONOCYTES NFR BLD AUTO: 6.3 %
NEUTROPHILS # BLD AUTO: 3.12 X10(3)/MCL (ref 2.1–9.2)
NEUTROPHILS NFR BLD AUTO: 61.9 %
PLATELET # BLD AUTO: 132 X10(3)/MCL (ref 130–400)
PMV BLD AUTO: 11 FL (ref 7.4–10.4)
POTASSIUM SERPL-SCNC: 5.1 MMOL/L (ref 3.5–5.1)
PROT SERPL-MCNC: 7.7 GM/DL (ref 5.8–7.6)
RBC # BLD AUTO: 4.37 X10(6)/MCL (ref 4.2–5.4)
SODIUM SERPL-SCNC: 144 MMOL/L (ref 136–145)
WBC # BLD AUTO: 5.04 X10(3)/MCL (ref 4.5–11.5)

## 2025-07-03 PROCEDURE — 86300 IMMUNOASSAY TUMOR CA 15-3: CPT

## 2025-07-03 PROCEDURE — 99999 PR PBB SHADOW E&M-EST. PATIENT-LVL V: CPT | Mod: PBBFAC,,, | Performed by: NURSE PRACTITIONER

## 2025-07-03 PROCEDURE — 85025 COMPLETE CBC W/AUTO DIFF WBC: CPT

## 2025-07-03 PROCEDURE — 96413 CHEMO IV INFUSION 1 HR: CPT

## 2025-07-03 PROCEDURE — 36415 COLL VENOUS BLD VENIPUNCTURE: CPT

## 2025-07-03 PROCEDURE — 63600175 PHARM REV CODE 636 W HCPCS: Mod: TB | Performed by: NURSE PRACTITIONER

## 2025-07-03 PROCEDURE — 25000003 PHARM REV CODE 250: Performed by: NURSE PRACTITIONER

## 2025-07-03 PROCEDURE — 80053 COMPREHEN METABOLIC PANEL: CPT

## 2025-07-03 PROCEDURE — 99215 OFFICE O/P EST HI 40 MIN: CPT | Mod: PBBFAC | Performed by: NURSE PRACTITIONER

## 2025-07-03 PROCEDURE — 82378 CARCINOEMBRYONIC ANTIGEN: CPT

## 2025-07-03 RX ORDER — SODIUM CHLORIDE 0.9 % (FLUSH) 0.9 %
10 SYRINGE (ML) INJECTION
Status: DISCONTINUED | OUTPATIENT
Start: 2025-07-03 | End: 2025-07-03 | Stop reason: HOSPADM

## 2025-07-03 RX ORDER — HEPARIN 100 UNIT/ML
500 SYRINGE INTRAVENOUS
Status: DISCONTINUED | OUTPATIENT
Start: 2025-07-03 | End: 2025-07-03 | Stop reason: HOSPADM

## 2025-07-03 RX ORDER — EPINEPHRINE 0.3 MG/.3ML
0.3 INJECTION SUBCUTANEOUS ONCE AS NEEDED
Status: DISCONTINUED | OUTPATIENT
Start: 2025-07-03 | End: 2025-07-03 | Stop reason: HOSPADM

## 2025-07-03 RX ORDER — DIPHENHYDRAMINE HYDROCHLORIDE 50 MG/ML
50 INJECTION, SOLUTION INTRAMUSCULAR; INTRAVENOUS ONCE AS NEEDED
Status: CANCELLED | OUTPATIENT
Start: 2025-07-03

## 2025-07-03 RX ORDER — HEPARIN 100 UNIT/ML
500 SYRINGE INTRAVENOUS
Status: CANCELLED | OUTPATIENT
Start: 2025-07-03

## 2025-07-03 RX ORDER — EPINEPHRINE 0.3 MG/.3ML
0.3 INJECTION SUBCUTANEOUS ONCE AS NEEDED
Status: CANCELLED | OUTPATIENT
Start: 2025-07-03

## 2025-07-03 RX ORDER — DIPHENHYDRAMINE HYDROCHLORIDE 50 MG/ML
50 INJECTION, SOLUTION INTRAMUSCULAR; INTRAVENOUS ONCE AS NEEDED
Status: DISCONTINUED | OUTPATIENT
Start: 2025-07-03 | End: 2025-07-03 | Stop reason: HOSPADM

## 2025-07-03 RX ORDER — SODIUM CHLORIDE 0.9 % (FLUSH) 0.9 %
10 SYRINGE (ML) INJECTION
Status: CANCELLED | OUTPATIENT
Start: 2025-07-03

## 2025-07-03 RX ADMIN — TRASTUZUMAB-ANNS 456 MG: 420 INJECTION, POWDER, LYOPHILIZED, FOR SOLUTION INTRAVENOUS at 03:07

## 2025-07-03 RX ADMIN — SODIUM CHLORIDE: 9 INJECTION, SOLUTION INTRAVENOUS at 03:07

## 2025-07-03 NOTE — PROGRESS NOTES
HEMATOLOGY/ONCOLOGY OFFICE CLINIC VISIT    Visit Information:    Initial Evaluation: 5/26/2021  Referring Provider: Dr. Robert Conner  Other providers:  Code status:  Not addressed    Diagnosis:  1) T2N0M0-Stage I Colon cancer. 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   Letrozole started 11/2024    Treatment/Oncology history:  -03/15/2021: Colonoscopy: malignant partially obstructing tumor in the ascending colon. Biopsy tubular adenoma with at least high-grade dysplasia.   -04/22/2021: Laparoscopic/robotic right hemicolectomy   -08/15/2024: Abnormal right breast mammogram BI-RADS category 4, suspicious  -08/26/2024: Ultrasound-guided right breast biopsy at 1:00 a.m. position--> invasive lobular carcinoma  -10/09/2024: s/p right lumpectomy with sentinel lymph node biopsy  -postlumpectomy radiation not indicated  - Herceptin started on 11/14/24 + Letrozole started 11/2024      Imaging:  CT A/P 3/17/2021 at [facility]: irregular colonic mass 5 to 6 cm in length proximal to midportion of descending colon.  Enhancing soft tissue component contacts and may invade the right lateral abdominal wall.  No regional or distant lymphadenopathy identified.  Cirrhotic liver with portal hypertension, small volume ascites and splenomegaly.  No focal lesion identified in the liver.    CT of the thorax 4/5/2021: no thoracic metastatic disease identified.  Small volume ascites and borderline splenomegaly.No adenopathy or distant metastases.  CT angiogram 5/20/2021: negative for pulmonary embolism but lungs demonstrate mild heterogeneous attenuation with subtle peripheral reticular opacities.  Primary differential consideration is small airways disease.  Findings may be secondary to multifocal infiltrate from infection versus  pulmonary edema.  It also showed moderate ascites with increase in size since prior examination.  Liver with nodular contour and was enlarged.  Spleen borderline enlarged.  A 1.3 x 1 cm enhancing nodule in the left adrenal gland no significant change.  Ultrasound of the abdomen 5/23/2021:cirrhosis of the liver with hepatosplenomegaly and ascites.   Ultrasound of the abdomen 10/26/2022: The pancreas appears grossly unremarkable.  No pancreatic mass or lesion is seen. liver is slightly enlarged in size. Liver is echogenic it measures 16 cm by my measurements..  No liver mass or lesion is seen. spleen appears enlarged and measures 14.5 cm.  Hepatomegaly with findings consistent with hepatic steatosis  CT C/A/P 2/16/2023:     1. No evidence of metastatic disease within the chest, abdomen and pelvis  2. Cirrhotic morphology of the liver  3. Changes of portal hypertension including borderline splenomegaly and portosystemic collateral  4. Mild interstitial scarring within the lungs.  CT C/A/P  8/28/2023:  No new suspicious findings chest, abdomen or pelvis.  Chronic findings above.  CT C/A/P 2/21/2024: No detrimental change identified since 08/28/2023.   CT 8/22/2024: Similar mild chronic changes in the lungs with no suspicious pulmonary nodule.  Moderate coronary artery calcification.  No pleural or pericardial effusion.  No pathologically enlarged thoracic lymph node. Cirrhosis with mild hepatic steatosis.  Similar mild splenomegaly.  Stable pancreas, adrenal glands and kidneys.  Very small hiatal hernia.  Normal caliber small bowel and colon with right mid abdominal ileal colonic anastomosis.  Distal colonic diverticulosis.  Normal bladder.  No ascites or abdominal/pelvic lymphadenopathy.  Aortoiliac atherosclerosis. No aggressive osseous lesion.No metastatic disease identified.     ECHO:  10/17/24, LVEF 60-65%  02/18/24, WOMV96-11%  4/1/2025, BDDC89-28%  07/03/2025, LVEF 57%    Bone Density:  8/9/24 @ [facility]:  Osteopenia      Pathology:  4/22/2022:   RIGHT COLON, HEMICOLECTOMY: ADENOCARCINOMA, WELL DIFFERENTIATED, 90% MUCINOUS.   --  Greatest tumor dimension, 7.0 cm (as measured grossly).  --  Adenocarcinoma arises from tubular adenoma with high grade dysplasia.  --  Lymphovascular invasion, not identified.  --  Adenocarcinoma superficially invades muscularis propria.  --  Eighteen mesenteric lymph nodes, no neoplasm (0/18).  --  Surgical margins, uninvolved.  POSTOPERATIVE SPINDLE CELL PSEUDOTUMOR, 1.5 CM.  -  The pseudotumor extends into mesenteric adipose tissue.     9/24/2021:  LIVER, CORE NEEDLE BIOPSY: FOCAL PORTAL TRIADITIS WITH SINUS DILATION.   - NO INTERFACE ACTIVITY.   - BRIDING FIBROSIS (FIBROSIS STAGE  F2-3)  - NO STEATOSIS.  Comment: Despite hepatitis testing demonstrating reactivity for hepatitis A in July 2021, no evidence of acute hepatitis is identified and LFTs are currently within normal limits. This case has also been reviewed in intradepartmental consultation.    8/26/2024:  RIGHT BREAST, 1 O'CLOCK MASS ULTRASOUND GUIDED VACUUM BIOPSY:  - Invasive carcinoma with lobular features, Xavier Grade 3, see comment.  - Carcinoma involving 3 of 4 cores, 7 mm in longest linear extent.  ER 89.40%, [place] 82.10%, HER2 equivocal, 2+, amplified by fish, Ki 67, high proliferation, 57.30%    10/9/2024:  1. BREAST, RIGHT, LUMPECTOMY:   PLEOMORPHIC LOBULAR CARCINOMA   DUCTAL CARCINOMA IN SITU WITH LOBULAR EXTENSION.   2. BREAST, SUPERIOR MARGIN, REEXCISION: NEGATIVE FOR INVASIVE AND IN SITU CARCINOMA.   3. AXILLA, ADDITIONAL AXILLA CONTENTS, EXCISION: TWO LYMPH NODES, NEGATIVE FOR METASTATIC CARCINOMA.   4. LYMPH NODE, RIGHT SENTINEL #1, EXCISION: ONE LYMPH NODE, NEGATIVE FOR METASTATIC CARCINOMA.   5. LYMPH NODE, RIGHT SENTINEL #2, EXCISION: ONE LYMPH NODE, NEGATIVE FOR METASTATIC CARCINOMA.   6. LYMPH NODES, RIGHT SENTINEL #3, EXCISION: TWO LYMPH NODES, NEGATIVE FOR METASTATIC CARCINOMA.     TUMOR Histologic Type:   Invasive carcinoma with features of : Pleomorphic lobular   Histologic Grade (Eastpoint Histologic Score):  Score 3   Tumor Size: 21 mm   Tumor Focality:  Single focus of invasive carcinoma   Ductal Carcinoma In Situ (DCIS):  Present   Lymphovascular Invasion:  Cannot be determined: Suspicious for LVI   MARGINS:  All margins negative for invasive carcinoma   REGIONAL LYMPH NODES   Total Number of Lymph Nodes Examined (sentinel and non-sentinel):  Exact number : 5   Number of [place] Nodes Examined:  Exact number : 3      PATHOLOGIC STAGE CLASSIFICATION (pTNM, AJCC 8th Edition)   pT2pN0           CLINICAL HISTORY:       Patient: 85 year old female with multiple medical problems kindly referred for colon cancer.  She initially presented with anemia and fecal occult blood positive.  She also reports 20 pounds weight loss over the last previous 6 months. Colonoscopy on 3/15/2021 revealed a malignant partially obstructing tumor in the ascending colon. Biopsy showed tubular adenoma with at least high-grade dysplasia.  On 3/17/2021 she underwent a CT scan of the abdomen and pelvis at [facility] imaging that showed an irregular colonic mass measuring about 5 to 6 cm in length involving proximal to midportion of descending colon.  Enhancing soft tissue component contacts and may invade the right lateral abdominal wall.  No regional or distant lymphadenopathy identified.  Cirrhotic liver with portal hypertension, small volume ascites and splenomegaly.  No focal lesion identified in the liver.  Staging CT of the thorax 4/5/2021 with no thoracic metastatic disease identified.  Small volume ascites and borderline splenomegaly.No adenopathy or distant metastases.   Patient was seen by Dr. Robert Conner and she underwent laparoscopic/robotic right hemicolectomy on 4/22/2021.  Pathology report as follows:   RIGHT COLON, HEMICOLECTOMY: ADENOCARCINOMA, WELL DIFFERENTIATED, 90% MUCINOUS. 0/18 LN positive for malignanacy.See above for  details.  Patient recovered from the surgery but came back to the emergency department for chest pain. She underwent CT angiogram that was negative for pulmonary embolism but lungs demonstrate mild heterogeneous attenuation with subtle peripheral reticular opacities.  Primary differential consideration is small airways disease.  Findings may be secondary to multifocal infiltrate from infection versus pulmonary edema.  It also showed moderate ascites with increase in size since prior examination.  Liver with nodular contour and was enlarged.  Spleen borderline enlarged.  1.3 x 1 cm enhancing nodule in the left adrenal gland no significant change.  She underwent ultrasound of the abdomen that confirmed the diagnosis of cirrhosis of the liver with hepatosplenomegaly and ascites.  She was discharged on 5/20/2021.  She stopped drinking at age 38, she says that she is a social drinker. She denies any family history of colon cancer. She denies any fever, chills, or sweats. No chest pain or shortness of breath.  She had liver biopsy ordered by Dr. Louis on 9/24/21. It was ordered for cirrhosis. She has no history of hepatitis infection or alcohol abuse. Biopsy was negative for malignancy. Just  fibrosis.         Screening mammogram was abnormal.  On 08/15/2024 diagnostic right breast mammogram and ultrasound showed a 1 cm hypoechoic mass with irregular margins and shadowing in the right breast at 1 o'clock position and suspicious for malignancy.  He has was read BI-RADS category 4.    On 10/09/2024 she underwent right breast lumpectomy.  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 Conner.   Herceptin and letrozole and so far tolerating treatment  "well.     07/03/25  Patient seen in clinic today with her daughter. Due today for Trastuzumab cycle 12 given q 3 weeks, labs reviewed stable for treatment. She is doing well with no complaints. She continues to suffer from diarrhea which is a chronic condition and is not worsening.   Denies any blood seen in stool or black stools. She denies night sweats, mood changes, bothersome vaginal dryness. Denies chest pain, SOB, abd pain.    ROS: All 14 points ROS taken and as per Interval History  Review of Systems   Constitutional:  Negative for chills, fever and weight loss.   HENT:  Negative for congestion and nosebleeds.    Eyes:  Negative for blurred vision, double vision and photophobia.   Respiratory:  Negative for cough, hemoptysis and shortness of breath.    Cardiovascular:  Negative for chest pain, palpitations, leg swelling and PND.   Gastrointestinal:  Positive for diarrhea. Negative for abdominal pain, blood in stool, constipation, melena, nausea and vomiting.   Genitourinary:  Positive for frequency. Negative for dysuria, hematuria and urgency.   Musculoskeletal:  Negative for back pain, falls and myalgias.   Skin:  Negative for itching and rash.   Neurological:  Negative for tremors, focal weakness, seizures, weakness and headaches.   Endo/Heme/Allergies:  Negative for environmental allergies. Does not bruise/bleed easily.   Psychiatric/Behavioral:  Negative for depression and suicidal ideas. The patient is not nervous/anxious.      Histories:  PMH/PSH/FH/SOCIAL/ALLERGIES AND MEDS REVIEWED AND UPDATED AS APPROPRIATE       Vitals:    07/03/25 1412   BP: (!) 148/70   BP Location: Left arm   Patient Position: Sitting   Pulse: 71   Resp: 18   Temp: 98.1 °F (36.7 °C)   TempSrc: Oral   SpO2: 96%   Weight: 74.8 kg (164 lb 12.8 oz)   Height: 5' 4" (1.626 m)             Wt Readings from Last 6 Encounters:   07/03/25 74.8 kg (164 lb 12.8 oz)   06/12/25 70.2 kg (154 lb 11.2 oz)   05/22/25 74.8 kg (165 lb)   04/30/25 " 76.3 kg (168 lb 4.8 oz)   04/09/25 74.6 kg (164 lb 8 oz)   03/19/25 75.6 kg (166 lb 10.7 oz)     Body mass index is 28.29 kg/m².  Body surface area is 1.84 meters squared.       Physical Exam  Constitutional:       Appearance: Normal appearance.   HENT:      Head: Normocephalic and atraumatic.   Eyes:      General: No scleral icterus.     Extraocular Movements: Extraocular movements intact.      Conjunctiva/sclera: Conjunctivae normal.   Neck:      Vascular: No JVD.   Cardiovascular:      Rate and Rhythm: Normal rate and regular rhythm.      Heart sounds: No murmur heard.  Pulmonary:      Effort: Pulmonary effort is normal.      Breath sounds: Normal breath sounds. No wheezing or rhonchi.   Chest:   Breasts:     Right: Normal. No swelling, mass, nipple discharge, skin change or tenderness.      Left: Normal. No swelling, mass, nipple discharge, skin change or tenderness.      Comments: Surgical sites well healed  Abdominal:      General: Bowel sounds are normal. There is no distension.      Palpations: Abdomen is soft. There is no mass.      Tenderness: There is no abdominal tenderness.   Musculoskeletal:      Cervical back: Neck supple.   Lymphadenopathy:      Head:      Right side of head: No submental or submandibular adenopathy.      Left side of head: No submental or submandibular adenopathy.      Cervical: No cervical adenopathy.      Upper Body:      Right upper body: No supraclavicular or axillary adenopathy.      Left upper body: No supraclavicular or axillary adenopathy.      Lower Body: No right inguinal adenopathy. No left inguinal adenopathy.   Skin:     General: Skin is warm.      Coloration: Skin is not jaundiced.      Findings: No lesion or rash.      Nails: There is no clubbing.   Neurological:      Mental Status: She is alert and oriented to person, place, and time.      Cranial Nerves: Cranial nerves 2-12 are intact.   Psychiatric:         Attention and Perception: Attention normal.          Behavior: Behavior is cooperative.         Judgment: Judgment normal.       ECOG SCORE             Laboratory:  CBC with Differential:  Lab Results   Component Value Date    WBC 5.04 07/03/2025    RBC 4.37 07/03/2025    HGB 12.7 07/03/2025    HCT 39.1 07/03/2025    MCV 89.5 07/03/2025    MCH 29.1 07/03/2025    MCHC 32.5 (L) 07/03/2025    RDW 13.5 07/03/2025     07/03/2025    MPV 11.0 (H) 07/03/2025        CMP:  Sodium   Date Value Ref Range Status   07/03/2025 144 136 - 145 mmol/L Final     Potassium   Date Value Ref Range Status   07/03/2025 5.1 3.5 - 5.1 mmol/L Final     Chloride   Date Value Ref Range Status   07/03/2025 107 98 - 107 mmol/L Final     CO2   Date Value Ref Range Status   07/03/2025 26 23 - 31 mmol/L Final     Glucose   Date Value Ref Range Status   07/03/2025 167 (H) 82 - 115 mg/dL Final     Blood Urea Nitrogen   Date Value Ref Range Status   07/03/2025 19.3 9.8 - 20.1 mg/dL Final     Creatinine   Date Value Ref Range Status   07/03/2025 0.76 0.55 - 1.02 mg/dL Final     Calcium   Date Value Ref Range Status   07/03/2025 10.5 (H) 8.4 - 10.2 mg/dL Final     Protein Total   Date Value Ref Range Status   07/03/2025 7.7 (H) 5.8 - 7.6 gm/dL Final     Albumin   Date Value Ref Range Status   07/03/2025 3.9 3.4 - 4.8 g/dL Final     Bilirubin Total   Date Value Ref Range Status   07/03/2025 0.5 <=1.5 mg/dL Final     ALP   Date Value Ref Range Status   07/03/2025 54 40 - 150 unit/L Final     AST   Date Value Ref Range Status   07/03/2025 27 11 - 45 unit/L Final     ALT   Date Value Ref Range Status   07/03/2025 23 0 - 55 unit/L Final     Estimated GFR-Non    Date Value Ref Range Status   05/20/2022 >60 mls/min/1.73/m2 Final         Assessment:       1. Malignant neoplasm of central portion of right breast in female, estrogen receptor positive    2. HER2-positive carcinoma of breast           1.) Invasive lobular carcinoma right breast Dx 8/26//2024  ---ER 89.40%, [place] 82.10%, HER2  equivocal, 2+, amplified by fish, Ki 67, high proliferation, 57.30%   ---status post right lumpectomy with sentinel lymph node biopsy on 10/9/2024--Invasive Pleomorphic lobular carcinoma   ---T2 N0 M0 stage IB, G3, ER/KS/HER2 positive    Review with her and daughter again today; diagnosis, prognosis and treatment recommendations according to NCCN guidelines.  Even though she is an elderly patient she will benefit of Herceptin base regimen followed by hormonal therapy once chemotherapy completed.  Because of her age recommend taxane with Herceptin.  Patient is adamant that she will not have chemotherapy or radiation therapy (not indicated at her age, also LN neg).  She does not opposed to try Herceptin and aromatase inhibitor.    --Patient with osteopenia, therefore we will start Prolia with letrozole for bone health and to decreased risk of pathological fractures and development of osteoporosis    2.) pT2pN0M0--Stage I colon cancer--diagnosed in April 2021   -3/15/2021:Colonoscopy: malignant partially obstructing tumor in the ascending colon. Biopsy tubular adenoma with at least high-grade dysplasia.    -4/22/2021: Laparoscopic/robotic right hemicolectomy    -Path: Adenocarcinoma superficially invades muscularis propria. Eighteen mesenteric lymph nodes, no neoplasm (0/18).     NCCN guidelines (COL-3) for pathological stage T2, N0, M0   Surveillance (COL-8): colonoscopy in 1 year after surgery and then in 1 year for advanced adenoma, if no advanced adenoma then repeat in 3 yrs and then q 5 yrs.   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  These are benign lesions.    4) Liver disease--Cirrhosis of the liver with Splenomegaly and ascites. Followed by GI    5) Heart disease- s/p stent placement  --ECHO with normal EF 60-65%        Plan:      Invasive  lobular carcinoma right breast Dx 8/26//2024  Status: Ongoing  Continue Kanjinti C12- due today labs Stable  Echo done today, 07/03/25- LVEF 57%.  Next Mammogram due 10/2025  Continue Letrazole, denies bothersome side effects.      Dx: pT2pN0M0--Stage I colon cancer--diagnosed in April 2021  Status:  Remission  Surveillance colonoscopy due 03/25, discussed with patient and daughter at length, she is adamant she does not want colonoscopy.  Denies any worrisome bowel changes. Chronic diarrhea      Dx: Osteopenia  Status: Ongoing  Dexa 08/09/24 Osteopenia in bilateral hips  Continue Prolia q 6mths last dose 05/22/25    RTC in 3 weeks with same day lab and infusion.     Chelita Isidro, CNP    - code applied: patient requires or will require a continuous, longitudinal and active collaborative plan of care related to this patient's health condition, Malignant neoplasm of central portion of right breast in female, estrogen receptor positive  the management of which requires the direction of a practitioner with specialized clinical knowledge, skill and expertise.

## 2025-07-09 LAB — CANCER AG27-29 SERPL-ACNC: 35.8 U/ML

## 2025-07-21 DIAGNOSIS — E11.9 TYPE 2 DIABETES MELLITUS WITHOUT COMPLICATION, WITHOUT LONG-TERM CURRENT USE OF INSULIN: ICD-10-CM

## 2025-07-21 RX ORDER — DULAGLUTIDE 0.75 MG/.5ML
0.75 INJECTION, SOLUTION SUBCUTANEOUS
Qty: 12 PEN | Refills: 0 | Status: SHIPPED | OUTPATIENT
Start: 2025-07-21 | End: 2026-07-21

## 2025-07-22 NOTE — PROGRESS NOTES
HEMATOLOGY/ONCOLOGY OFFICE CLINIC VISIT    Visit Information:    Initial Evaluation: 5/26/2021  Referring Provider: Dr. Robert Conner  Other providers:  Code status:  Not addressed    Diagnosis:  1) T2N0M0-Stage I Colon cancer. 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   Letrozole started 11/2024    Treatment/Oncology history:  -03/15/2021: Colonoscopy: malignant partially obstructing tumor in the ascending colon. Biopsy tubular adenoma with at least high-grade dysplasia.   -04/22/2021: Laparoscopic/robotic right hemicolectomy   -08/15/2024: Abnormal right breast mammogram BI-RADS category 4, suspicious  -08/26/2024: Ultrasound-guided right breast biopsy at 1:00 a.m. position--> invasive lobular carcinoma  -10/09/2024: s/p right lumpectomy with sentinel lymph node biopsy  -postlumpectomy radiation not indicated  - Herceptin started on 11/14/24 + Letrozole started 11/2024      Imaging:  CT A/P 3/17/2021 at [facility]: irregular colonic mass 5 to 6 cm in length proximal to midportion of descending colon.  Enhancing soft tissue component contacts and may invade the right lateral abdominal wall.  No regional or distant lymphadenopathy identified.  Cirrhotic liver with portal hypertension, small volume ascites and splenomegaly.  No focal lesion identified in the liver.    CT of the thorax 4/5/2021: no thoracic metastatic disease identified.  Small volume ascites and borderline splenomegaly.No adenopathy or distant metastases.  CT angiogram 5/20/2021: negative for pulmonary embolism but lungs demonstrate mild heterogeneous attenuation with subtle peripheral reticular opacities.  Primary differential consideration is small airways disease.  Findings may be secondary to multifocal infiltrate from infection versus  pulmonary edema.  It also showed moderate ascites with increase in size since prior examination.  Liver with nodular contour and was enlarged.  Spleen borderline enlarged.  A 1.3 x 1 cm enhancing nodule in the left adrenal gland no significant change.  Ultrasound of the abdomen 5/23/2021:cirrhosis of the liver with hepatosplenomegaly and ascites.   Ultrasound of the abdomen 10/26/2022: The pancreas appears grossly unremarkable.  No pancreatic mass or lesion is seen. liver is slightly enlarged in size. Liver is echogenic it measures 16 cm by my measurements..  No liver mass or lesion is seen. spleen appears enlarged and measures 14.5 cm.  Hepatomegaly with findings consistent with hepatic steatosis  CT C/A/P 2/16/2023:     1. No evidence of metastatic disease within the chest, abdomen and pelvis  2. Cirrhotic morphology of the liver  3. Changes of portal hypertension including borderline splenomegaly and portosystemic collateral  4. Mild interstitial scarring within the lungs.  CT C/A/P  8/28/2023:  No new suspicious findings chest, abdomen or pelvis.  Chronic findings above.  CT C/A/P 2/21/2024: No detrimental change identified since 08/28/2023.   CT 8/22/2024: Similar mild chronic changes in the lungs with no suspicious pulmonary nodule.  Moderate coronary artery calcification.  No pleural or pericardial effusion.  No pathologically enlarged thoracic lymph node. Cirrhosis with mild hepatic steatosis.  Similar mild splenomegaly.  Stable pancreas, adrenal glands and kidneys.  Very small hiatal hernia.  Normal caliber small bowel and colon with right mid abdominal ileal colonic anastomosis.  Distal colonic diverticulosis.  Normal bladder.  No ascites or abdominal/pelvic lymphadenopathy.  Aortoiliac atherosclerosis. No aggressive osseous lesion.No metastatic disease identified.     ECHO:  10/17/24, LVEF 60-65%  02/18/24, IAMZ91-23%  4/1/2025, PIFL69-93%  07/03/2025, LVEF 57%    Bone Density:  8/9/24 @ [facility]:  Osteopenia      Pathology:  4/22/2022:   RIGHT COLON, HEMICOLECTOMY: ADENOCARCINOMA, WELL DIFFERENTIATED, 90% MUCINOUS.   --  Greatest tumor dimension, 7.0 cm (as measured grossly).  --  Adenocarcinoma arises from tubular adenoma with high grade dysplasia.  --  Lymphovascular invasion, not identified.  --  Adenocarcinoma superficially invades muscularis propria.  --  Eighteen mesenteric lymph nodes, no neoplasm (0/18).  --  Surgical margins, uninvolved.  POSTOPERATIVE SPINDLE CELL PSEUDOTUMOR, 1.5 CM.  -  The pseudotumor extends into mesenteric adipose tissue.     9/24/2021:  LIVER, CORE NEEDLE BIOPSY: FOCAL PORTAL TRIADITIS WITH SINUS DILATION.   - NO INTERFACE ACTIVITY.   - BRIDING FIBROSIS (FIBROSIS STAGE  F2-3)  - NO STEATOSIS.  Comment: Despite hepatitis testing demonstrating reactivity for hepatitis A in July 2021, no evidence of acute hepatitis is identified and LFTs are currently within normal limits. This case has also been reviewed in intradepartmental consultation.    8/26/2024:  RIGHT BREAST, 1 O'CLOCK MASS ULTRASOUND GUIDED VACUUM BIOPSY:  - Invasive carcinoma with lobular features, Xavier Grade 3, see comment.  - Carcinoma involving 3 of 4 cores, 7 mm in longest linear extent.  ER 89.40%, [place] 82.10%, HER2 equivocal, 2+, amplified by fish, Ki 67, high proliferation, 57.30%    10/9/2024:  1. BREAST, RIGHT, LUMPECTOMY:   PLEOMORPHIC LOBULAR CARCINOMA   DUCTAL CARCINOMA IN SITU WITH LOBULAR EXTENSION.   2. BREAST, SUPERIOR MARGIN, REEXCISION: NEGATIVE FOR INVASIVE AND IN SITU CARCINOMA.   3. AXILLA, ADDITIONAL AXILLA CONTENTS, EXCISION: TWO LYMPH NODES, NEGATIVE FOR METASTATIC CARCINOMA.   4. LYMPH NODE, RIGHT SENTINEL #1, EXCISION: ONE LYMPH NODE, NEGATIVE FOR METASTATIC CARCINOMA.   5. LYMPH NODE, RIGHT SENTINEL #2, EXCISION: ONE LYMPH NODE, NEGATIVE FOR METASTATIC CARCINOMA.   6. LYMPH NODES, RIGHT SENTINEL #3, EXCISION: TWO LYMPH NODES, NEGATIVE FOR METASTATIC CARCINOMA.     TUMOR Histologic Type:   Invasive carcinoma with features of : Pleomorphic lobular   Histologic Grade ([place] Histologic Score):  Score 3   Tumor Size: 21 mm   Tumor Focality:  Single focus of invasive carcinoma   Ductal Carcinoma In Situ (DCIS):  Present   Lymphovascular Invasion:  Cannot be determined: Suspicious for LVI   MARGINS:  All margins negative for invasive carcinoma   REGIONAL LYMPH NODES   Total Number of Lymph Nodes Examined (sentinel and non-sentinel):  Exact number : 5   Number of [place] Nodes Examined:  Exact number : 3      PATHOLOGIC STAGE CLASSIFICATION (pTNM, AJCC 8th Edition)   pT2pN0           CLINICAL HISTORY:       Patient: 85 year old female with multiple medical problems kindly referred for colon cancer.  She initially presented with anemia and fecal occult blood positive.  She also reports 20 pounds weight loss over the last previous 6 months. Colonoscopy on 3/15/2021 revealed a malignant partially obstructing tumor in the ascending colon. Biopsy showed tubular adenoma with at least high-grade dysplasia.  On 3/17/2021 she underwent a CT scan of the abdomen and pelvis at [facility] that showed an irregular colonic mass measuring about 5 to 6 cm in length involving proximal to midportion of descending colon.  Enhancing soft tissue component contacts and may invade the right lateral abdominal wall.  No regional or distant lymphadenopathy identified.  Cirrhotic liver with portal hypertension, small volume ascites and splenomegaly.  No focal lesion identified in the liver.  Staging CT of the thorax 4/5/2021 with no thoracic metastatic disease identified.  Small volume ascites and borderline splenomegaly.No adenopathy or distant metastases.   Patient was seen by Dr. Robert Conner and she underwent laparoscopic/robotic right hemicolectomy on 4/22/2021.  Pathology report as follows:   RIGHT COLON, HEMICOLECTOMY: ADENOCARCINOMA, WELL DIFFERENTIATED, 90% MUCINOUS. 0/18 LN positive for malignanacy.See above for  details.  Patient recovered from the surgery but came back to the emergency department for chest pain. She underwent CT angiogram that was negative for pulmonary embolism but lungs demonstrate mild heterogeneous attenuation with subtle peripheral reticular opacities.  Primary differential consideration is small airways disease.  Findings may be secondary to multifocal infiltrate from infection versus pulmonary edema.  It also showed moderate ascites with increase in size since prior examination.  Liver with nodular contour and was enlarged.  Spleen borderline enlarged.  1.3 x 1 cm enhancing nodule in the left adrenal gland no significant change.  She underwent ultrasound of the abdomen that confirmed the diagnosis of cirrhosis of the liver with hepatosplenomegaly and ascites.  She was discharged on 5/20/2021.  She stopped drinking at age 38, she says that she is a social drinker. She denies any family history of colon cancer. She denies any fever, chills, or sweats. No chest pain or shortness of breath.  She had liver biopsy ordered by Dr. Louis on 9/24/21. It was ordered for cirrhosis. She has no history of hepatitis infection or alcohol abuse. Biopsy was negative for malignancy. Just  fibrosis.         Screening mammogram was abnormal.  On 08/15/2024 diagnostic right breast mammogram and ultrasound showed a 1 cm hypoechoic mass with irregular margins and shadowing in the right breast at 1 o'clock position and suspicious for malignancy.  He has was read BI-RADS category 4.    On 10/09/2024 she underwent right breast lumpectomy.  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 Conner.   Herceptin and letrozole and so far tolerating treatment  well.       07/24/25: Patient presents today for follow up. She is due for Trastuzumab cycle 13, labs reviewed stable for treatment. Pts Echo from 07/03/25, EF 57%, previous has been 60-65%. Pt reports neck and shoulder pain, this has been going on a couple weeks. Will order MRI neck/shoulder for evaluation. She denies night sweats, mood changes, bothersome vaginal dryness. Denies chest pain, SOB, abd pain. She had diarrhea but this resolved.    ROS: All 14 points ROS taken and as per Interval History  Review of Systems   Constitutional:  Negative for chills, fever and weight loss.   HENT:  Negative for congestion and nosebleeds.    Eyes:  Negative for blurred vision, double vision and photophobia.   Respiratory:  Negative for cough, hemoptysis and shortness of breath.    Cardiovascular:  Negative for chest pain, palpitations, leg swelling and PND.   Gastrointestinal:  Positive for diarrhea. Negative for abdominal pain, blood in stool, constipation, melena, nausea and vomiting.   Genitourinary:  Negative for dysuria, frequency, hematuria and urgency.   Musculoskeletal:  Positive for back pain. Negative for falls and myalgias.   Skin:  Negative for itching and rash.   Neurological:  Negative for tremors, focal weakness, seizures, weakness and headaches.   Endo/Heme/Allergies:  Negative for environmental allergies. Does not bruise/bleed easily.   Psychiatric/Behavioral:  Negative for depression and suicidal ideas. The patient is not nervous/anxious.    Answers submitted by the patient for this visit:  Review of Systems Questionnaire (Submitted on 7/22/2025)  appetite change : No  unexpected weight change: No  mouth sores: No  visual disturbance: No  adenopathy: No    Histories:  PMH/PSH/FH/SOCIAL/ALLERGIES AND MEDS REVIEWED AND UPDATED AS APPROPRIATE       Vitals:    07/24/25 1051   BP: 132/75   BP Location: Left arm   Patient Position: Sitting   Pulse: 74   Resp: 18   Temp: 98.1 °F (36.7 °C)   TempSrc: Oral   SpO2:  "95%   Weight: 73.9 kg (163 lb)   Height: 5' 4" (1.626 m)     Wt Readings from Last 6 Encounters:   07/24/25 73.9 kg (163 lb 0.1 oz)   07/24/25 73.9 kg (163 lb)   07/03/25 74.8 kg (164 lb 12.8 oz)   06/12/25 70.2 kg (154 lb 11.2 oz)   05/22/25 74.8 kg (165 lb)   04/30/25 76.3 kg (168 lb 4.8 oz)     Body mass index is 27.98 kg/m².  Body surface area is 1.83 meters squared.       Physical Exam  Constitutional:       Appearance: Normal appearance.   HENT:      Head: Normocephalic and atraumatic.   Eyes:      General: No scleral icterus.     Extraocular Movements: Extraocular movements intact.      Conjunctiva/sclera: Conjunctivae normal.   Neck:      Vascular: No JVD.   Cardiovascular:      Rate and Rhythm: Normal rate and regular rhythm.      Heart sounds: No murmur heard.  Pulmonary:      Effort: Pulmonary effort is normal.      Breath sounds: Normal breath sounds. No wheezing or rhonchi.   Chest:   Breasts:     Right: Normal. No swelling, mass, nipple discharge, skin change or tenderness.      Left: Normal. No swelling, mass, nipple discharge, skin change or tenderness.      Comments: Surgical sites well healed  Abdominal:      General: Bowel sounds are normal. There is no distension.      Palpations: Abdomen is soft. There is no mass.      Tenderness: There is no abdominal tenderness.   Musculoskeletal:      Cervical back: Neck supple.   Lymphadenopathy:      Head:      Right side of head: No submental or submandibular adenopathy.      Left side of head: No submental or submandibular adenopathy.      Cervical: No cervical adenopathy.      Upper Body:      Right upper body: No supraclavicular or axillary adenopathy.      Left upper body: No supraclavicular or axillary adenopathy.      Lower Body: No right inguinal adenopathy. No left inguinal adenopathy.   Skin:     General: Skin is warm.      Coloration: Skin is not jaundiced.      Findings: No lesion or rash.      Nails: There is no clubbing.   Neurological:      " Mental Status: She is alert and oriented to person, place, and time.      Cranial Nerves: Cranial nerves 2-12 are intact.   Psychiatric:         Attention and Perception: Attention normal.         Behavior: Behavior is cooperative.         Judgment: Judgment normal.       ECOG SCORE    1 - Restricted in strenuous activity-ambulatory and able to carry out work of a light nature         Laboratory:  CBC with Differential:  Lab Results   Component Value Date    WBC 4.37 (L) 07/24/2025    RBC 4.34 07/24/2025    HGB 12.7 07/24/2025    HCT 38.5 07/24/2025    MCV 88.7 07/24/2025    MCH 29.3 07/24/2025    MCHC 33.0 07/24/2025    RDW 13.6 07/24/2025     (L) 07/24/2025    MPV 11.2 (H) 07/24/2025        CMP:  Sodium   Date Value Ref Range Status   07/24/2025 143 136 - 145 mmol/L Final     Potassium   Date Value Ref Range Status   07/24/2025 4.4 3.5 - 5.1 mmol/L Final     Chloride   Date Value Ref Range Status   07/24/2025 107 98 - 107 mmol/L Final     CO2   Date Value Ref Range Status   07/24/2025 25 23 - 31 mmol/L Final     Glucose   Date Value Ref Range Status   07/24/2025 106 82 - 115 mg/dL Final     Blood Urea Nitrogen   Date Value Ref Range Status   07/24/2025 17.6 9.8 - 20.1 mg/dL Final     Creatinine   Date Value Ref Range Status   07/24/2025 0.86 0.55 - 1.02 mg/dL Final     Calcium   Date Value Ref Range Status   07/24/2025 9.7 8.4 - 10.2 mg/dL Final     Protein Total   Date Value Ref Range Status   07/24/2025 7.6 5.8 - 7.6 gm/dL Final     Albumin   Date Value Ref Range Status   07/24/2025 4.0 3.4 - 4.8 g/dL Final     Bilirubin Total   Date Value Ref Range Status   07/24/2025 0.9 <=1.5 mg/dL Final     ALP   Date Value Ref Range Status   07/24/2025 43 40 - 150 unit/L Final     AST   Date Value Ref Range Status   07/24/2025 27 11 - 45 unit/L Final     ALT   Date Value Ref Range Status   07/24/2025 22 0 - 55 unit/L Final     Estimated GFR-Non    Date Value Ref Range Status   05/20/2022 >60  mls/min/1.73/m2 Final         Assessment:       1. Malignant neoplasm of central portion of right breast in female, estrogen receptor positive    2. Malignant neoplasm of descending colon    3. Encounter for monitoring cardiotoxic drug therapy    4. Neck pain    5. Shoulder pain, unspecified chronicity, unspecified laterality             1.) Invasive lobular carcinoma right breast Dx 8/26//2024  ---ER 89.40%, [place] 82.10%, HER2 equivocal, 2+, amplified by fish, Ki 67, high proliferation, 57.30%   ---status post right lumpectomy with sentinel lymph node biopsy on 10/9/2024--Invasive Pleomorphic lobular carcinoma   ---T2 N0 M0 stage IB, G3, ER/FL/HER2 positive    Review with her and daughter again today; diagnosis, prognosis and treatment recommendations according to NCCN guidelines.  Even though she is an elderly patient she will benefit of Herceptin base regimen followed by hormonal therapy once chemotherapy completed.  Because of her age recommend taxane with Herceptin.  Patient is adamant that she will not have chemotherapy or radiation therapy (not indicated at her age, also LN neg).  She does not opposed to try Herceptin and aromatase inhibitor.    --Patient with osteopenia, therefore we will start Prolia with letrozole for bone health and to decreased risk of pathological fractures and development of osteoporosis    2.) pT2pN0M0--Stage I colon cancer--diagnosed in April 2021   -3/15/2021:Colonoscopy: malignant partially obstructing tumor in the ascending colon. Biopsy tubular adenoma with at least high-grade dysplasia.    -4/22/2021: Laparoscopic/robotic right hemicolectomy    -Path: Adenocarcinoma superficially invades muscularis propria. Eighteen mesenteric lymph nodes, no neoplasm (0/18).     NCCN guidelines (COL-3) for pathological stage T2, N0, M0   Surveillance (COL-8): colonoscopy in 1 year after surgery and then in 1 year for advanced adenoma, if no advanced adenoma then repeat in 3 yrs and then q 5  yrs.   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  These are benign lesions.    4) Liver disease--Cirrhosis of the liver with Splenomegaly and ascites. Followed by GI    5) Heart disease- s/p stent placement  --ECHO with normal EF 60-65%  Most recent with EF 57%. This is still normal but because of her age and she is on a potential cardiotoxic medication for her breast cancer I will like to do a shorter interval ECHO to be sure her EF is not decreasing.   In the meantime she will continue trastuzumab. Instructed to call for any SOB, CP or edema        Plan:        Okay for Kanjinti C13   Repeat Echo in 6 weeks - does at cardiologist office Dr aKtiana HERNANDEZ neck - neck/shoulder pain   RTC in 3 weeks with same day lab and infusion  Labs: CBC, CMP, CA 27-29    - code applied: patient requires or will require a continuous, longitudinal and active collaborative plan of care related to this patient's health condition, Malignant neoplasm of central portion of right breast in female, estrogen receptor positive  the management of which requires the direction of a practitioner with specialized clinical knowledge, skill and expertise.          Adriana Kapadia MD  Hematology/Oncology  [facility]      Professional Services   I, Yomaira Nicolas LPN, acted solely as a scribe for and in the presence of Dr. Adriana Kapadia, who performed these services.

## 2025-07-24 ENCOUNTER — INFUSION (OUTPATIENT)
Dept: INFUSION THERAPY | Facility: HOSPITAL | Age: 85
End: 2025-07-24
Attending: INTERNAL MEDICINE
Payer: MEDICARE

## 2025-07-24 ENCOUNTER — OFFICE VISIT (OUTPATIENT)
Dept: HEMATOLOGY/ONCOLOGY | Facility: CLINIC | Age: 85
End: 2025-07-24
Payer: MEDICARE

## 2025-07-24 VITALS
WEIGHT: 163 LBS | TEMPERATURE: 98 F | HEART RATE: 74 BPM | BODY MASS INDEX: 27.83 KG/M2 | HEIGHT: 64 IN | BODY MASS INDEX: 27.83 KG/M2 | HEIGHT: 64 IN | RESPIRATION RATE: 18 BRPM | OXYGEN SATURATION: 95 % | WEIGHT: 163 LBS | DIASTOLIC BLOOD PRESSURE: 75 MMHG | SYSTOLIC BLOOD PRESSURE: 132 MMHG

## 2025-07-24 DIAGNOSIS — C50.111 MALIGNANT NEOPLASM OF CENTRAL PORTION OF RIGHT BREAST IN FEMALE, ESTROGEN RECEPTOR POSITIVE: ICD-10-CM

## 2025-07-24 DIAGNOSIS — Z17.0 MALIGNANT NEOPLASM OF CENTRAL PORTION OF RIGHT BREAST IN FEMALE, ESTROGEN RECEPTOR POSITIVE: Primary | ICD-10-CM

## 2025-07-24 DIAGNOSIS — C18.6 MALIGNANT NEOPLASM OF DESCENDING COLON: ICD-10-CM

## 2025-07-24 DIAGNOSIS — Z17.0 MALIGNANT NEOPLASM OF CENTRAL PORTION OF RIGHT BREAST IN FEMALE, ESTROGEN RECEPTOR POSITIVE: ICD-10-CM

## 2025-07-24 DIAGNOSIS — Z17.31 HER2-POSITIVE CARCINOMA OF BREAST: Primary | ICD-10-CM

## 2025-07-24 DIAGNOSIS — C50.919 HER2-POSITIVE CARCINOMA OF BREAST: Primary | ICD-10-CM

## 2025-07-24 DIAGNOSIS — M25.519 SHOULDER PAIN, UNSPECIFIED CHRONICITY, UNSPECIFIED LATERALITY: ICD-10-CM

## 2025-07-24 DIAGNOSIS — C50.111 MALIGNANT NEOPLASM OF CENTRAL PORTION OF RIGHT BREAST IN FEMALE, ESTROGEN RECEPTOR POSITIVE: Primary | ICD-10-CM

## 2025-07-24 DIAGNOSIS — M54.2 NECK PAIN: ICD-10-CM

## 2025-07-24 DIAGNOSIS — Z79.899 ENCOUNTER FOR MONITORING CARDIOTOXIC DRUG THERAPY: ICD-10-CM

## 2025-07-24 DIAGNOSIS — Z51.81 ENCOUNTER FOR MONITORING CARDIOTOXIC DRUG THERAPY: ICD-10-CM

## 2025-07-24 PROCEDURE — 25000003 PHARM REV CODE 250: Performed by: INTERNAL MEDICINE

## 2025-07-24 PROCEDURE — 63600175 PHARM REV CODE 636 W HCPCS: Mod: TB | Performed by: INTERNAL MEDICINE

## 2025-07-24 PROCEDURE — 99215 OFFICE O/P EST HI 40 MIN: CPT | Mod: PBBFAC,25 | Performed by: INTERNAL MEDICINE

## 2025-07-24 PROCEDURE — 96413 CHEMO IV INFUSION 1 HR: CPT

## 2025-07-24 PROCEDURE — 99999 PR PBB SHADOW E&M-EST. PATIENT-LVL V: CPT | Mod: PBBFAC,,, | Performed by: INTERNAL MEDICINE

## 2025-07-24 RX ORDER — DIPHENHYDRAMINE HYDROCHLORIDE 50 MG/ML
50 INJECTION, SOLUTION INTRAMUSCULAR; INTRAVENOUS ONCE AS NEEDED
Status: CANCELLED | OUTPATIENT
Start: 2025-07-24

## 2025-07-24 RX ORDER — EPINEPHRINE 0.3 MG/.3ML
0.3 INJECTION SUBCUTANEOUS ONCE AS NEEDED
Status: DISCONTINUED | OUTPATIENT
Start: 2025-07-24 | End: 2025-07-24 | Stop reason: HOSPADM

## 2025-07-24 RX ORDER — DIPHENHYDRAMINE HYDROCHLORIDE 50 MG/ML
50 INJECTION, SOLUTION INTRAMUSCULAR; INTRAVENOUS ONCE AS NEEDED
Status: DISCONTINUED | OUTPATIENT
Start: 2025-07-24 | End: 2025-07-24 | Stop reason: HOSPADM

## 2025-07-24 RX ORDER — HEPARIN 100 UNIT/ML
500 SYRINGE INTRAVENOUS
Status: CANCELLED | OUTPATIENT
Start: 2025-07-24

## 2025-07-24 RX ORDER — SODIUM CHLORIDE 0.9 % (FLUSH) 0.9 %
10 SYRINGE (ML) INJECTION
Status: CANCELLED | OUTPATIENT
Start: 2025-07-24

## 2025-07-24 RX ORDER — SODIUM CHLORIDE 0.9 % (FLUSH) 0.9 %
10 SYRINGE (ML) INJECTION
Status: DISCONTINUED | OUTPATIENT
Start: 2025-07-24 | End: 2025-07-24 | Stop reason: HOSPADM

## 2025-07-24 RX ORDER — EPINEPHRINE 0.3 MG/.3ML
0.3 INJECTION SUBCUTANEOUS ONCE AS NEEDED
Status: CANCELLED | OUTPATIENT
Start: 2025-07-24

## 2025-07-24 RX ORDER — HEPARIN 100 UNIT/ML
500 SYRINGE INTRAVENOUS
Status: DISCONTINUED | OUTPATIENT
Start: 2025-07-24 | End: 2025-07-24 | Stop reason: HOSPADM

## 2025-07-24 RX ADMIN — TRASTUZUMAB-ANNS 456 MG: 420 INJECTION, POWDER, LYOPHILIZED, FOR SOLUTION INTRAVENOUS at 12:07

## 2025-07-25 ENCOUNTER — RESULTS FOLLOW-UP (OUTPATIENT)
Dept: FAMILY MEDICINE | Facility: CLINIC | Age: 85
End: 2025-07-25
Payer: MEDICARE

## 2025-07-25 DIAGNOSIS — E11.9 TYPE 2 DIABETES MELLITUS WITHOUT COMPLICATION, WITHOUT LONG-TERM CURRENT USE OF INSULIN: Primary | ICD-10-CM

## 2025-07-30 ENCOUNTER — HOSPITAL ENCOUNTER (OUTPATIENT)
Dept: RADIOLOGY | Facility: HOSPITAL | Age: 85
Discharge: HOME | End: 2025-07-30
Attending: INTERNAL MEDICINE
Payer: MEDICARE

## 2025-07-30 DIAGNOSIS — M25.519 SHOULDER PAIN, UNSPECIFIED CHRONICITY, UNSPECIFIED LATERALITY: ICD-10-CM

## 2025-07-30 DIAGNOSIS — M54.2 NECK PAIN: ICD-10-CM

## 2025-07-30 PROCEDURE — 25500020 PHARM REV CODE 255: Performed by: INTERNAL MEDICINE

## 2025-07-30 PROCEDURE — 72156 MRI NECK SPINE W/O & W/DYE: CPT | Mod: TC

## 2025-07-30 PROCEDURE — A9577 INJ MULTIHANCE: HCPCS | Performed by: INTERNAL MEDICINE

## 2025-07-30 RX ORDER — LANCETS 28 GAUGE
1 EACH MISCELLANEOUS DAILY
COMMUNITY

## 2025-07-30 RX ORDER — LANCETS 28 GAUGE
EACH MISCELLANEOUS
Qty: 200 EACH | Refills: 6 | Status: SHIPPED | OUTPATIENT
Start: 2025-07-30

## 2025-07-30 RX ADMIN — GADOBENATE DIMEGLUMINE 15 ML: 529 INJECTION, SOLUTION INTRAVENOUS at 05:07

## 2025-08-12 DIAGNOSIS — K59.00 CONSTIPATION, UNSPECIFIED: Primary | ICD-10-CM

## 2025-08-14 ENCOUNTER — HOSPITAL ENCOUNTER (OUTPATIENT)
Dept: RADIOLOGY | Facility: HOSPITAL | Age: 85
Discharge: HOME | End: 2025-08-14
Attending: INTERNAL MEDICINE
Payer: MEDICARE

## 2025-08-14 ENCOUNTER — INFUSION (OUTPATIENT)
Dept: INFUSION THERAPY | Facility: HOSPITAL | Age: 85
End: 2025-08-14
Attending: INTERNAL MEDICINE
Payer: MEDICARE

## 2025-08-14 ENCOUNTER — OFFICE VISIT (OUTPATIENT)
Dept: HEMATOLOGY/ONCOLOGY | Facility: CLINIC | Age: 85
End: 2025-08-14
Payer: MEDICARE

## 2025-08-14 VITALS
BODY MASS INDEX: 27.74 KG/M2 | HEART RATE: 70 BPM | WEIGHT: 162.5 LBS | RESPIRATION RATE: 18 BRPM | DIASTOLIC BLOOD PRESSURE: 70 MMHG | TEMPERATURE: 98 F | OXYGEN SATURATION: 96 % | SYSTOLIC BLOOD PRESSURE: 130 MMHG | HEIGHT: 64 IN

## 2025-08-14 DIAGNOSIS — C50.111 MALIGNANT NEOPLASM OF CENTRAL PORTION OF RIGHT BREAST IN FEMALE, ESTROGEN RECEPTOR POSITIVE: ICD-10-CM

## 2025-08-14 DIAGNOSIS — Z17.0 MALIGNANT NEOPLASM OF CENTRAL PORTION OF RIGHT BREAST IN FEMALE, ESTROGEN RECEPTOR POSITIVE: ICD-10-CM

## 2025-08-14 DIAGNOSIS — R93.7 ABNORMAL MRI, CERVICAL SPINE: ICD-10-CM

## 2025-08-14 DIAGNOSIS — Z17.31 HER2-POSITIVE CARCINOMA OF BREAST: ICD-10-CM

## 2025-08-14 DIAGNOSIS — C50.111 MALIGNANT NEOPLASM OF CENTRAL PORTION OF RIGHT BREAST IN FEMALE, ESTROGEN RECEPTOR POSITIVE: Primary | ICD-10-CM

## 2025-08-14 DIAGNOSIS — Z17.31 HER2-POSITIVE CARCINOMA OF BREAST: Primary | ICD-10-CM

## 2025-08-14 DIAGNOSIS — C50.919 HER2-POSITIVE CARCINOMA OF BREAST: Primary | ICD-10-CM

## 2025-08-14 DIAGNOSIS — C50.919 HER2-POSITIVE CARCINOMA OF BREAST: ICD-10-CM

## 2025-08-14 DIAGNOSIS — C18.9 MALIGNANT NEOPLASM OF COLON, UNSPECIFIED PART OF COLON: ICD-10-CM

## 2025-08-14 DIAGNOSIS — Z17.0 MALIGNANT NEOPLASM OF CENTRAL PORTION OF RIGHT BREAST IN FEMALE, ESTROGEN RECEPTOR POSITIVE: Primary | ICD-10-CM

## 2025-08-14 DIAGNOSIS — K59.00 CONSTIPATION, UNSPECIFIED: ICD-10-CM

## 2025-08-14 PROCEDURE — 25000003 PHARM REV CODE 250: Performed by: NURSE PRACTITIONER

## 2025-08-14 PROCEDURE — A4216 STERILE WATER/SALINE, 10 ML: HCPCS | Performed by: NURSE PRACTITIONER

## 2025-08-14 PROCEDURE — 96413 CHEMO IV INFUSION 1 HR: CPT

## 2025-08-14 PROCEDURE — 74019 RADEX ABDOMEN 2 VIEWS: CPT | Mod: TC

## 2025-08-14 PROCEDURE — 63600175 PHARM REV CODE 636 W HCPCS: Mod: TB | Performed by: NURSE PRACTITIONER

## 2025-08-14 PROCEDURE — 99215 OFFICE O/P EST HI 40 MIN: CPT | Mod: PBBFAC,25 | Performed by: NURSE PRACTITIONER

## 2025-08-14 PROCEDURE — 99999 PR PBB SHADOW E&M-EST. PATIENT-LVL V: CPT | Mod: PBBFAC,,, | Performed by: NURSE PRACTITIONER

## 2025-08-14 RX ORDER — DIPHENHYDRAMINE HYDROCHLORIDE 50 MG/ML
50 INJECTION, SOLUTION INTRAMUSCULAR; INTRAVENOUS ONCE AS NEEDED
Status: CANCELLED | OUTPATIENT
Start: 2025-08-14

## 2025-08-14 RX ORDER — HEPARIN 100 UNIT/ML
500 SYRINGE INTRAVENOUS
Status: CANCELLED | OUTPATIENT
Start: 2025-08-14

## 2025-08-14 RX ORDER — EPINEPHRINE 0.3 MG/.3ML
0.3 INJECTION SUBCUTANEOUS ONCE AS NEEDED
Status: DISCONTINUED | OUTPATIENT
Start: 2025-08-14 | End: 2025-08-14 | Stop reason: HOSPADM

## 2025-08-14 RX ORDER — HEPARIN 100 UNIT/ML
500 SYRINGE INTRAVENOUS
Status: DISCONTINUED | OUTPATIENT
Start: 2025-08-14 | End: 2025-08-14 | Stop reason: HOSPADM

## 2025-08-14 RX ORDER — SODIUM CHLORIDE 0.9 % (FLUSH) 0.9 %
10 SYRINGE (ML) INJECTION
Status: DISCONTINUED | OUTPATIENT
Start: 2025-08-14 | End: 2025-08-14 | Stop reason: HOSPADM

## 2025-08-14 RX ORDER — EPINEPHRINE 0.3 MG/.3ML
0.3 INJECTION SUBCUTANEOUS ONCE AS NEEDED
Status: CANCELLED | OUTPATIENT
Start: 2025-08-14

## 2025-08-14 RX ORDER — SPIRONOLACTONE 25 MG/1
12.5 TABLET ORAL DAILY
Qty: 45 TABLET | Refills: 1 | Status: SHIPPED | OUTPATIENT
Start: 2025-08-14 | End: 2026-02-10

## 2025-08-14 RX ORDER — DIPHENHYDRAMINE HYDROCHLORIDE 50 MG/ML
50 INJECTION, SOLUTION INTRAMUSCULAR; INTRAVENOUS ONCE AS NEEDED
Status: DISCONTINUED | OUTPATIENT
Start: 2025-08-14 | End: 2025-08-14 | Stop reason: HOSPADM

## 2025-08-14 RX ORDER — SODIUM CHLORIDE 0.9 % (FLUSH) 0.9 %
10 SYRINGE (ML) INJECTION
Status: CANCELLED | OUTPATIENT
Start: 2025-08-14

## 2025-08-14 RX ADMIN — TRASTUZUMAB-ANNS 456 MG: 420 INJECTION, POWDER, LYOPHILIZED, FOR SOLUTION INTRAVENOUS at 02:08

## 2025-08-14 RX ADMIN — SODIUM CHLORIDE: 9 INJECTION, SOLUTION INTRAVENOUS at 02:08

## 2025-08-14 RX ADMIN — Medication 10 ML: at 02:08

## 2025-08-15 ENCOUNTER — TELEPHONE (OUTPATIENT)
Dept: NEUROSURGERY | Facility: CLINIC | Age: 85
End: 2025-08-15
Payer: MEDICARE

## 2025-08-15 ENCOUNTER — OFFICE VISIT (OUTPATIENT)
Dept: FAMILY MEDICINE | Facility: CLINIC | Age: 85
End: 2025-08-15
Payer: MEDICARE

## 2025-08-15 VITALS
OXYGEN SATURATION: 97 % | RESPIRATION RATE: 16 BRPM | DIASTOLIC BLOOD PRESSURE: 78 MMHG | HEIGHT: 64 IN | TEMPERATURE: 98 F | BODY MASS INDEX: 27.98 KG/M2 | HEART RATE: 70 BPM | WEIGHT: 163.88 LBS | SYSTOLIC BLOOD PRESSURE: 134 MMHG

## 2025-08-15 DIAGNOSIS — Z12.31 BREAST CANCER SCREENING BY MAMMOGRAM: ICD-10-CM

## 2025-08-15 DIAGNOSIS — Z17.0 MALIGNANT NEOPLASM OF CENTRAL PORTION OF RIGHT BREAST IN FEMALE, ESTROGEN RECEPTOR POSITIVE: ICD-10-CM

## 2025-08-15 DIAGNOSIS — Z71.89 ADVANCED CARE PLANNING/COUNSELING DISCUSSION: ICD-10-CM

## 2025-08-15 DIAGNOSIS — E55.9 VITAMIN D DEFICIENCY: ICD-10-CM

## 2025-08-15 DIAGNOSIS — F41.9 ANXIETY: ICD-10-CM

## 2025-08-15 DIAGNOSIS — M54.12 CERVICAL RADICULOPATHY: ICD-10-CM

## 2025-08-15 DIAGNOSIS — E03.9 HYPOTHYROIDISM, UNSPECIFIED TYPE: ICD-10-CM

## 2025-08-15 DIAGNOSIS — E11.69 HYPERLIPIDEMIA ASSOCIATED WITH TYPE 2 DIABETES MELLITUS: ICD-10-CM

## 2025-08-15 DIAGNOSIS — Z00.00 MEDICARE ANNUAL WELLNESS VISIT, SUBSEQUENT: Primary | ICD-10-CM

## 2025-08-15 DIAGNOSIS — E11.59 HYPERTENSION ASSOCIATED WITH DIABETES: ICD-10-CM

## 2025-08-15 DIAGNOSIS — M85.80 OSTEOPENIA, UNSPECIFIED LOCATION: ICD-10-CM

## 2025-08-15 DIAGNOSIS — C50.111 MALIGNANT NEOPLASM OF CENTRAL PORTION OF RIGHT BREAST IN FEMALE, ESTROGEN RECEPTOR POSITIVE: ICD-10-CM

## 2025-08-15 DIAGNOSIS — I15.2 HYPERTENSION ASSOCIATED WITH DIABETES: ICD-10-CM

## 2025-08-15 DIAGNOSIS — E78.5 HYPERLIPIDEMIA ASSOCIATED WITH TYPE 2 DIABETES MELLITUS: ICD-10-CM

## 2025-08-15 DIAGNOSIS — M47.816 LUMBAR SPONDYLOSIS: Primary | ICD-10-CM

## 2025-08-15 DIAGNOSIS — C18.9 MALIGNANT NEOPLASM OF COLON, UNSPECIFIED PART OF COLON: ICD-10-CM

## 2025-08-15 DIAGNOSIS — E11.9 TYPE 2 DIABETES MELLITUS WITHOUT COMPLICATION, WITHOUT LONG-TERM CURRENT USE OF INSULIN: ICD-10-CM

## 2025-08-15 DIAGNOSIS — Z78.0 POSTMENOPAUSAL: ICD-10-CM

## 2025-08-15 DIAGNOSIS — I85.00 ESOPHAGEAL VARICES WITHOUT BLEEDING, UNSPECIFIED ESOPHAGEAL VARICES TYPE: ICD-10-CM

## 2025-08-15 LAB
ALBUMIN/CREAT UR: 21.9 MG/GM CR (ref 0–30)
BACTERIA #/AREA URNS AUTO: ABNORMAL /HPF
BILIRUB UR QL STRIP.AUTO: NEGATIVE
CLARITY UR: CLEAR
COLOR UR AUTO: ABNORMAL
CREAT UR-MCNC: 104.8 MG/DL (ref 45–106)
EPI CELLS #/AREA URNS HPF: ABNORMAL /HPF
GLUCOSE UR QL STRIP: NORMAL
HGB UR QL STRIP: NEGATIVE
KETONES UR QL STRIP: NEGATIVE
LEUKOCYTE ESTERASE UR QL STRIP: 500
MICROALBUMIN UR-MCNC: 22.9 UG/ML
MUCOUS THREADS URNS QL MICRO: ABNORMAL /LPF
NITRITE UR QL STRIP: NEGATIVE
PH UR STRIP: 5 [PH]
PROT UR QL STRIP: NEGATIVE
RBC #/AREA URNS AUTO: ABNORMAL /HPF
SP GR UR STRIP.AUTO: 1.02 (ref 1–1.03)
SQUAMOUS #/AREA URNS LPF: ABNORMAL /HPF
UROBILINOGEN UR STRIP-ACNC: NORMAL
WBC #/AREA URNS AUTO: ABNORMAL /HPF

## 2025-08-15 PROCEDURE — 82570 ASSAY OF URINE CREATININE: CPT | Performed by: FAMILY MEDICINE

## 2025-08-15 PROCEDURE — 81001 URINALYSIS AUTO W/SCOPE: CPT | Performed by: FAMILY MEDICINE

## 2025-08-15 RX ORDER — TRASTUZUMAB AND HYALURONIDASE-OYSK 600; 10000 MG/5ML; U/5ML
INJECTION, SOLUTION SUBCUTANEOUS
COMMUNITY

## 2025-08-15 RX ORDER — NITROGLYCERIN 0.4 MG/1
0.4 TABLET SUBLINGUAL EVERY 5 MIN PRN
Qty: 30 TABLET | Refills: 0 | Status: SHIPPED | OUTPATIENT
Start: 2025-08-15

## 2025-08-15 RX ORDER — FAMOTIDINE 40 MG/1
40 TABLET, FILM COATED ORAL
Qty: 90 TABLET | Refills: 3 | Status: SHIPPED | OUTPATIENT
Start: 2025-08-15 | End: 2026-08-15

## 2025-08-15 RX ORDER — LIRAGLUTIDE 6 MG/ML
INJECTION SUBCUTANEOUS
COMMUNITY
End: 2025-08-15

## 2025-08-15 RX ORDER — METFORMIN HYDROCHLORIDE 1000 MG/1
500 TABLET ORAL 2 TIMES DAILY WITH MEALS
Qty: 90 TABLET | Refills: 3 | Status: SHIPPED | OUTPATIENT
Start: 2025-08-15 | End: 2026-08-15

## 2025-08-15 RX ORDER — PRAVASTATIN SODIUM 20 MG/1
20 TABLET ORAL NIGHTLY
Qty: 90 TABLET | Refills: 3 | Status: SHIPPED | OUTPATIENT
Start: 2025-08-15

## 2025-08-15 RX ORDER — DULAGLUTIDE 0.75 MG/.5ML
0.75 INJECTION, SOLUTION SUBCUTANEOUS
Qty: 12 PEN | Refills: 3 | Status: SHIPPED | OUTPATIENT
Start: 2025-08-15 | End: 2026-08-15

## 2025-08-15 RX ORDER — DENOSUMAB 60 MG/ML
INJECTION SUBCUTANEOUS
COMMUNITY

## 2025-08-15 RX ORDER — ESCITALOPRAM OXALATE 10 MG/1
10 TABLET ORAL DAILY
Qty: 90 TABLET | Refills: 3 | Status: SHIPPED | OUTPATIENT
Start: 2025-08-15 | End: 2026-08-15

## 2025-08-15 RX ORDER — LEVOTHYROXINE SODIUM 75 UG/1
75 TABLET ORAL DAILY
Qty: 90 TABLET | Refills: 3 | Status: SHIPPED | OUTPATIENT
Start: 2025-08-15 | End: 2026-08-15

## 2025-08-18 ENCOUNTER — RESULTS FOLLOW-UP (OUTPATIENT)
Dept: FAMILY MEDICINE | Facility: CLINIC | Age: 85
End: 2025-08-18
Payer: MEDICARE

## 2025-08-18 DIAGNOSIS — R82.90 ABNORMAL URINE: Primary | ICD-10-CM

## 2025-08-27 ENCOUNTER — HOSPITAL ENCOUNTER (OUTPATIENT)
Dept: RADIOLOGY | Facility: HOSPITAL | Age: 85
Discharge: HOME | End: 2025-08-27
Attending: NURSE PRACTITIONER
Payer: MEDICARE

## 2025-08-27 ENCOUNTER — RESULTS FOLLOW-UP (OUTPATIENT)
Dept: FAMILY MEDICINE | Facility: CLINIC | Age: 85
End: 2025-08-27
Payer: MEDICARE

## 2025-08-27 DIAGNOSIS — R82.90 ABNORMAL URINE: ICD-10-CM

## 2025-08-27 DIAGNOSIS — C50.111 MALIGNANT NEOPLASM OF CENTRAL PORTION OF RIGHT BREAST IN FEMALE, ESTROGEN RECEPTOR POSITIVE: ICD-10-CM

## 2025-08-27 DIAGNOSIS — Z17.0 MALIGNANT NEOPLASM OF CENTRAL PORTION OF RIGHT BREAST IN FEMALE, ESTROGEN RECEPTOR POSITIVE: ICD-10-CM

## 2025-08-27 PROCEDURE — 78815 PET IMAGE W/CT SKULL-THIGH: CPT | Mod: TC,PS

## 2025-08-27 PROCEDURE — 87077 CULTURE AEROBIC IDENTIFY: CPT | Performed by: FAMILY MEDICINE

## 2025-08-27 PROCEDURE — A9552 F18 FDG: HCPCS | Performed by: NURSE PRACTITIONER

## 2025-08-27 RX ORDER — FLUDEOXYGLUCOSE F18 500 MCI/ML
10 INJECTION INTRAVENOUS
Status: COMPLETED | OUTPATIENT
Start: 2025-08-27 | End: 2025-08-27

## 2025-08-27 RX ADMIN — FLUDEOXYGLUCOSE F-18 10.3 MILLICURIE: 500 INJECTION INTRAVENOUS at 09:08

## 2025-08-28 ENCOUNTER — PATIENT MESSAGE (OUTPATIENT)
Dept: ADMINISTRATIVE | Facility: HOSPITAL | Age: 85
End: 2025-08-28
Payer: MEDICARE

## 2025-08-30 LAB — BACTERIA UR CULT: NORMAL

## 2025-09-04 ENCOUNTER — OFFICE VISIT (OUTPATIENT)
Dept: HEMATOLOGY/ONCOLOGY | Facility: CLINIC | Age: 85
End: 2025-09-04
Payer: MEDICARE

## 2025-09-04 ENCOUNTER — INFUSION (OUTPATIENT)
Dept: INFUSION THERAPY | Facility: HOSPITAL | Age: 85
End: 2025-09-04
Attending: INTERNAL MEDICINE
Payer: MEDICARE

## 2025-09-04 VITALS
BODY MASS INDEX: 28.15 KG/M2 | HEART RATE: 64 BPM | WEIGHT: 164.88 LBS | HEIGHT: 64 IN | TEMPERATURE: 98 F | OXYGEN SATURATION: 95 % | SYSTOLIC BLOOD PRESSURE: 154 MMHG | RESPIRATION RATE: 18 BRPM | DIASTOLIC BLOOD PRESSURE: 76 MMHG

## 2025-09-04 DIAGNOSIS — Z17.0 MALIGNANT NEOPLASM OF CENTRAL PORTION OF RIGHT BREAST IN FEMALE, ESTROGEN RECEPTOR POSITIVE: ICD-10-CM

## 2025-09-04 DIAGNOSIS — Z17.31 HER2-POSITIVE CARCINOMA OF BREAST: ICD-10-CM

## 2025-09-04 DIAGNOSIS — C50.111 MALIGNANT NEOPLASM OF CENTRAL PORTION OF RIGHT BREAST IN FEMALE, ESTROGEN RECEPTOR POSITIVE: Primary | ICD-10-CM

## 2025-09-04 DIAGNOSIS — Z17.0 MALIGNANT NEOPLASM OF CENTRAL PORTION OF RIGHT BREAST IN FEMALE, ESTROGEN RECEPTOR POSITIVE: Primary | ICD-10-CM

## 2025-09-04 DIAGNOSIS — C50.111 MALIGNANT NEOPLASM OF CENTRAL PORTION OF RIGHT BREAST IN FEMALE, ESTROGEN RECEPTOR POSITIVE: ICD-10-CM

## 2025-09-04 DIAGNOSIS — Z17.31 HER2-POSITIVE CARCINOMA OF BREAST: Primary | ICD-10-CM

## 2025-09-04 DIAGNOSIS — C50.919 HER2-POSITIVE CARCINOMA OF BREAST: Primary | ICD-10-CM

## 2025-09-04 DIAGNOSIS — C50.919 HER2-POSITIVE CARCINOMA OF BREAST: ICD-10-CM

## 2025-09-04 PROCEDURE — 96413 CHEMO IV INFUSION 1 HR: CPT

## 2025-09-04 PROCEDURE — 63600175 PHARM REV CODE 636 W HCPCS: Mod: TB | Performed by: NURSE PRACTITIONER

## 2025-09-04 PROCEDURE — 99215 OFFICE O/P EST HI 40 MIN: CPT | Mod: PBBFAC,25 | Performed by: NURSE PRACTITIONER

## 2025-09-04 PROCEDURE — 99999 PR PBB SHADOW E&M-EST. PATIENT-LVL V: CPT | Mod: PBBFAC,,, | Performed by: NURSE PRACTITIONER

## 2025-09-04 PROCEDURE — 25000003 PHARM REV CODE 250: Performed by: NURSE PRACTITIONER

## 2025-09-04 RX ORDER — DIPHENHYDRAMINE HYDROCHLORIDE 50 MG/ML
50 INJECTION, SOLUTION INTRAMUSCULAR; INTRAVENOUS ONCE AS NEEDED
Status: CANCELLED | OUTPATIENT
Start: 2025-09-04 | End: 2037-01-31

## 2025-09-04 RX ORDER — HEPARIN 100 UNIT/ML
500 SYRINGE INTRAVENOUS
Status: CANCELLED | OUTPATIENT
Start: 2025-09-04

## 2025-09-04 RX ORDER — SODIUM CHLORIDE 0.9 % (FLUSH) 0.9 %
10 SYRINGE (ML) INJECTION
Status: DISCONTINUED | OUTPATIENT
Start: 2025-09-04 | End: 2025-09-04 | Stop reason: HOSPADM

## 2025-09-04 RX ORDER — EPINEPHRINE 0.3 MG/.3ML
0.3 INJECTION SUBCUTANEOUS ONCE AS NEEDED
Status: CANCELLED | OUTPATIENT
Start: 2025-09-04 | End: 2037-01-31

## 2025-09-04 RX ORDER — HEPARIN 100 UNIT/ML
500 SYRINGE INTRAVENOUS
Status: DISCONTINUED | OUTPATIENT
Start: 2025-09-04 | End: 2025-09-04 | Stop reason: HOSPADM

## 2025-09-04 RX ORDER — EPINEPHRINE 0.3 MG/.3ML
0.3 INJECTION SUBCUTANEOUS ONCE AS NEEDED
Status: DISCONTINUED | OUTPATIENT
Start: 2025-09-04 | End: 2025-09-04 | Stop reason: HOSPADM

## 2025-09-04 RX ORDER — DIPHENHYDRAMINE HYDROCHLORIDE 50 MG/ML
50 INJECTION, SOLUTION INTRAMUSCULAR; INTRAVENOUS ONCE AS NEEDED
Status: DISCONTINUED | OUTPATIENT
Start: 2025-09-04 | End: 2025-09-04 | Stop reason: HOSPADM

## 2025-09-04 RX ORDER — SODIUM CHLORIDE 0.9 % (FLUSH) 0.9 %
10 SYRINGE (ML) INJECTION
Status: CANCELLED | OUTPATIENT
Start: 2025-09-04

## 2025-09-04 RX ADMIN — TRASTUZUMAB-ANNS 456 MG: 420 INJECTION, POWDER, LYOPHILIZED, FOR SOLUTION INTRAVENOUS at 02:09

## 2025-09-04 RX ADMIN — SODIUM CHLORIDE: 9 INJECTION, SOLUTION INTRAVENOUS at 02:09

## (undated) DEVICE — KIT CANIST SUCTION 1200CC

## (undated) DEVICE — SOL NACL IRR 1000ML BTL

## (undated) DEVICE — FORCEP BIOPSY ENDO 2.8MM 240CM

## (undated) DEVICE — NDL HYPO REG 25G X 1 1/2

## (undated) DEVICE — PROBE TRUNODE GAMMA HAND PIECE

## (undated) DEVICE — Device

## (undated) DEVICE — ELECTRODE BLADE E-Z CLEAN 4IN

## (undated) DEVICE — KIT SURGICAL COLON .25 1.1OZ

## (undated) DEVICE — SUT 2/0 30IN SILK BLK BRAI

## (undated) DEVICE — SUT VICRYL PLUS 3-0 SH 18IN

## (undated) DEVICE — WIRE DUALOK BRST LSN 20GX10.7

## (undated) DEVICE — TUBING O2 FEMALE CONN 13FT

## (undated) DEVICE — SPONGE LAP 18X18 PREWASHED

## (undated) DEVICE — DRESSING TRANS 4X4 TEGADERM

## (undated) DEVICE — FORCEP ALLIGATOR 2.8MM W/NDL

## (undated) DEVICE — COLLECTION SPECIMEN NEPTUNE

## (undated) DEVICE — SUPPORT ULNA NERVE PROTECTOR

## (undated) DEVICE — FORCEP BX LG CAP 2.8MMX240CM

## (undated) DEVICE — GLOVE SIGNATURE MICRO LTX 7

## (undated) DEVICE — SUT MCRYL PLUS 4-0 PS2 27IN

## (undated) DEVICE — RETRACTOR TESSA HANDHELD

## (undated) DEVICE — DRESSING TELFA N ADH 3X8

## (undated) DEVICE — TIP SUCTION YANKAUER

## (undated) DEVICE — CONTAINER SPECIMEN SCREW 4OZ

## (undated) DEVICE — ADHESIVE DERMABOND ADVANCED

## (undated) DEVICE — NDL 27G X 1 1/4

## (undated) DEVICE — SOL IRRI STRL WATER 1000ML

## (undated) DEVICE — ELECTRODE PATIENT RETURN DISP

## (undated) DEVICE — NDL SYR 10ML 18X1.5 LL BLUNT

## (undated) DEVICE — SPONGE COTTON TRAY 4X4IN

## (undated) DEVICE — BAG LABGUARD BIOHAZARD 6X9IN